# Patient Record
Sex: MALE | Race: WHITE | NOT HISPANIC OR LATINO | Employment: FULL TIME | URBAN - METROPOLITAN AREA
[De-identification: names, ages, dates, MRNs, and addresses within clinical notes are randomized per-mention and may not be internally consistent; named-entity substitution may affect disease eponyms.]

---

## 2017-03-30 ENCOUNTER — OFFICE VISIT (OUTPATIENT)
Dept: MEDICAL GROUP | Facility: PHYSICIAN GROUP | Age: 57
End: 2017-03-30
Payer: COMMERCIAL

## 2017-03-30 VITALS
RESPIRATION RATE: 12 BRPM | OXYGEN SATURATION: 98 % | BODY MASS INDEX: 21.2 KG/M2 | TEMPERATURE: 98.2 F | WEIGHT: 160 LBS | DIASTOLIC BLOOD PRESSURE: 84 MMHG | SYSTOLIC BLOOD PRESSURE: 126 MMHG | HEART RATE: 68 BPM | HEIGHT: 73 IN

## 2017-03-30 DIAGNOSIS — G43.109 MIGRAINE WITH AURA AND WITHOUT STATUS MIGRAINOSUS, NOT INTRACTABLE: ICD-10-CM

## 2017-03-30 DIAGNOSIS — E03.9 HYPOTHYROIDISM, UNSPECIFIED TYPE: ICD-10-CM

## 2017-03-30 DIAGNOSIS — E78.5 DYSLIPIDEMIA: ICD-10-CM

## 2017-03-30 DIAGNOSIS — Z87.19 HISTORY OF ULCERATIVE COLITIS: ICD-10-CM

## 2017-03-30 DIAGNOSIS — Z12.5 SCREENING FOR PROSTATE CANCER: ICD-10-CM

## 2017-03-30 PROCEDURE — 99214 OFFICE O/P EST MOD 30 MIN: CPT | Performed by: FAMILY MEDICINE

## 2017-03-30 ASSESSMENT — PATIENT HEALTH QUESTIONNAIRE - PHQ9: CLINICAL INTERPRETATION OF PHQ2 SCORE: 0

## 2017-03-30 NOTE — MR AVS SNAPSHOT
"        Estrada Ventura Robertflynnyomaira   3/30/2017 1:00 PM   Office Visit   MRN: [id number]    Department:  [facility]   Dept Phone:  [phone]    Description:  Male : 1960   Provider:  Anahi Palmer M.D.           Reason for Visit     Establish Care           Allergies as of 3/30/2017     No Known Allergies      You were diagnosed with     Hypothyroidism, unspecified type   [9774449]       History of ulcerative colitis   [587103]       Dyslipidemia   [945537]       Screening for prostate cancer   [544338]       Migraine with aura and without status migrainosus, not intractable   [654327]         Vital Signs     Blood Pressure Pulse Temperature Respirations Height Weight    126/84 mmHg 68 36.8 °C (98.2 °F) 12 1.854 m (6' 0.99\") 72.576 kg (160 lb)    Body Mass Index Oxygen Saturation Smoking Status             21.11 kg/m2 98% Former Smoker         Basic Information     Date Of Birth Sex Race Ethnicity Preferred Language    1960 Male White Non- English      Problem List              ICD-10-CM Priority Class Noted - Resolved    History of ulcerative colitis Z87.19   2012 - Present    Depigmentation of skin L81.9   2012 - Present    Vitiligo L80   2013 - Present    Hypothyroid E03.9   2013 - Present    Thyroid nodule E04.1   2015 - Present    Dyslipidemia E78.5   2015 - Present      Health Maintenance        Date Due Completion Dates    IMM INFLUENZA (1) 2016 ---    COLONOSCOPY 2019 (Prv Comp)    Override on 2009: Previously completed    IMM DTaP/Tdap/Td Vaccine (2 - Td) 2022            Current Immunizations     Tdap Vaccine 2012      Below and/or attached are the medications your provider expects you to take. Review all of your home medications and newly ordered medications with your provider and/or pharmacist. Follow medication instructions as directed by your provider and/or pharmacist. Please keep your medication list with " you and share with your provider. Update the information when medications are discontinued, doses are changed, or new medications (including over-the-counter products) are added; and carry medication information at all times in the event of emergency situations     Allergies:  No Known Allergies          Medications  Valid as of: March 30, 2017 -  2:14 PM    Generic Name Brand Name Tablet Size Instructions for use    Hydrocortisone Acetate (Suppos) ANUSOL-HC 25 MG Insert 1 Suppository in rectum every 12 hours.        Levothyroxine Sodium (Tab) SYNTHROID 125 MCG Take 1 Tab by mouth Every morning on an empty stomach.        .                 Medicines prescribed today were sent to:     [facility], [place] - [address]    [address] [address]    Phone: [phone] Fax: [phone]    Open 24 Hours?: No      Medication refill instructions:       If your prescription bottle indicates you have medication refills left, it is not necessary to call your provider’s office. Please contact your pharmacy and they will refill your medication.    If your prescription bottle indicates you do not have any refills left, you may request refills at any time through one of the following ways: The online [employer] system (except Urgent Care), by calling your provider’s office, or by asking your pharmacy to contact your provider’s office with a refill request. Medication refills are processed only during regular business hours and may not be available until the next business day. Your provider may request additional information or to have a follow-up visit with you prior to refilling your medication.   *Please Note: Medication refills are assigned a new Rx number when refilled electronically. Your pharmacy may indicate that no refills were authorized even though a new prescription for the same medication is available at the pharmacy. Please request the medicine by name with the pharmacy before  contacting your provider for a refill.        Your To Do List     Future Labs/Procedures Complete By Expires    CBC WITH DIFFERENTIAL  As directed 3/30/2018    COMP METABOLIC PANEL  As directed 3/30/2018    LIPID PROFILE  As directed 3/30/2018    MR-MRA HEAD-WITH & W/O AND SEQUENCES  As directed 3/30/2018    PROSTATE SPECIFIC AG SCREENING  As directed 3/30/2018    TSH WITH REFLEX TO FT4  As directed 3/30/2018         MyChart Access Code: Activation code not generated  Current MyChart Status: Active

## 2017-03-31 NOTE — PROGRESS NOTES
"Chief Complaint   Patient presents with   • Establish Care       HISTORY OF PRESENT ILLNESS: Patient is a 57 y.o. male new patient who presents today to discuss the following issues:    1. Hypothyroidism, unspecified type  This is a very pleasant 57-year-old gentleman who developed acquired hypothyroidism and has had multiple thyroid nodules. He reports that he is been stable on Synthroid 125 µg for significant amount of time now. Previously followed by endocrinology, however Dr. CHA Valdez left the practice and is no longer under his care. He reports that his energy level, hair or skin have been normal. Denies any diarrhea or constipation or palpitations. He is adherent to his Synthroid. Due for recheck on his labs.    2. History of ulcerative colitis  Patient relates history of ulcerative colitis more than 20 years ago. He reports that at that time made significant and drastic dietary changes that included a low glycemic and unprocessed diet. He reports with this he has not had any recurrence or flares. Reports the most recent colonoscopy was maybe around 2009 done in [place], and recalls at that time he was told there is no evidence that he ever had ulcerative colitis, no polyps were found and he was told to return in 10 years    3. Dyslipidemia  Patient does have history of mild hyperlipidemia. He is due for check on his cholesterol levels. No personal history of heart disease or stroke.    4. Screening for prostate cancer  Due for PSA testing.    5. Migraine with aura and without status migrainosus, not intractable   Patient does relate a concerning episode in which she developed what sounds like a central scotoma in the right eye in which he found that there was a very large blurry spots in his vision for several hours, followed by left sided \"prisms\". He reports there was no associated headache or neck pain. After describing the incident to his wife who suffers from migraines she told him that she felt this was " "likely an optical migraine. He has not had his eyes examined. Of most concern, patient has history of father, brother and sister all having had strokes. His father and brother he is unsure of if there were hemorrhagic strokes. His sister did have a ruptured cerebral aneurysm. He has never had any imaging to assess her aneurysm himself. He denies any headaches, any other visual changes. No numbness or tingling. No weakness. No change in gait or speech pattern.      Active Ambulatory Problems     Diagnosis Date Noted   • History of ulcerative colitis 2012   • Depigmentation of skin 2012   • Vitiligo 2013   • Hypothyroid 2013   • Thyroid nodule 2015   • Dyslipidemia 2015     Resolved Ambulatory Problems     Diagnosis Date Noted   • Palpitations 10/10/2013   • Diaphoresis 10/10/2013     Past Medical History   Diagnosis Date   • IBD (inflammatory bowel disease)        Allergies:Review of patient's allergies indicates no known allergies.    Current Outpatient Prescriptions   Medication Sig Dispense Refill   • SYNTHROID 125 MCG Tab Take 1 Tab by mouth Every morning on an empty stomach. 30 Tab 11   • hydrocortisone (ANUSOL-HC) 25 MG Suppos Insert 1 Suppository in rectum every 12 hours. 10 Suppository 1     No current facility-administered medications for this visit.       Social History   Substance Use Topics   • Smoking status: Former Smoker -- 0.10 packs/day for 10 years     Types: Cigarettes     Quit date: 1986   • Smokeless tobacco: Never Used   • Alcohol Use: Yes      Comment: occasionally       Family Status   Relation Status Death Age   • Mother  70   • Father  80     MI   • Sister  55     Growth in brain stem \"burst\"   • Brother Alive    • Son Alive    • Son Alive    • Son Alive      Family History   Problem Relation Age of Onset   • Cancer Mother      Breast Cancer--metastatic   • Heart Disease Father    • Stroke Father    • Stroke Brother    • " "Stroke Sister      [facility]     Health Maintenance Due   Topic Date Due   • IMM INFLUENZA (1) 09/01/2016       ROS:  Review of Systems   Constitutional: Negative for fever, chills, weight loss and malaise/fatigue.   HENT: Negative for ear pain, nosebleeds, congestion, sore throat and neck pain.    Eyes: Negative for blurred vision.   Respiratory: Negative for cough, sputum production, shortness of breath and wheezing.    Cardiovascular: Negative for chest pain, palpitations, orthopnea and leg swelling.   Gastrointestinal: Negative for heartburn, nausea, vomiting and abdominal pain.   Genitourinary: Negative for dysuria, urgency and frequency.   Musculoskeletal: Negative for myalgias, back pain and joint pain.   Skin: Negative for rash and itching.   Neurological: Negative for dizziness, tingling, tremors, sensory change, focal weakness and headaches.   Endo/Heme/Allergies: Does not bruise/bleed easily.   Psychiatric/Behavioral: Negative for depression, suicidal ideas and memory loss.  The patient is not nervous/anxious and does not have insomnia.      Exam:  Blood pressure 126/84, pulse 68, temperature 36.8 °C (98.2 °F), resp. rate 12, height 1.854 m (6' 0.99\"), weight 72.576 kg (160 lb), SpO2 98 %.  General:  Well nourished, well developed male in NAD  HEENT: Normocephalic and atraumatic. TMs clear bilaterally. Oropharynx is clear      without erythema and no tonsillar exudate. Nasal mucosa pink with minimal      Rhinorrhea.  EYES: EOMI.  Conjunctiva clear.    Neck: Supple without JVD or bruit. Thyroid is not enlarged.  Pulmonary: Clear to ausculation and percussion.  Normal effort. No rales, ronchi, or wheezing.  Cardiovascular: Regular rate and rhythm without murmur, no peripheral edema  Abdomen: positive bowel sounds.  Non tender, non distended, no hepatosplenomegaly.   MSK: normal ROM in upper and lower extremities bilaterally  Psych: appropriate affect and concentration, oriented to person and place  Neuro: no " unilateral weakness in upper or lower extremities.  No gait disturbance    Please note that this dictation was created using voice recognition software. I have made every reasonable attempt to correct obvious errors, but I expect that there are errors of grammar and possibly content that I did not discover before finalizing the note.    Assessment/Plan:  1. Hypothyroidism, unspecified type  Continue Synthroid 125 check labs  - TSH WITH REFLEX TO FT4; Future    2. History of ulcerative colitis  No evidence of recurrence, will request records of previous colonoscopy to update his health maintenance   - CBC WITH DIFFERENTIAL; Future    3. Dyslipidemia    - LIPID PROFILE; Future  - COMP METABOLIC PANEL; Future    4. Screening for prostate cancer    - PROSTATE SPECIFIC AG SCREENING; Future    5. Migraine with aura and without status migrainosus, not intractable  Concerning with family history of cerebral aneurysm and multiple strokes. We'll evaluate with MRI MRA of the brain. At this time I don't have any concern for acute bleed therefore we will spare the radiation and cost of CT will not be pursued.  - MR-MRA HEAD-WITH & W/O AND SEQUENCES; Future

## 2017-06-23 ENCOUNTER — HOSPITAL ENCOUNTER (OUTPATIENT)
Dept: LAB | Facility: MEDICAL CENTER | Age: 57
End: 2017-06-23
Attending: FAMILY MEDICINE
Payer: COMMERCIAL

## 2017-06-23 DIAGNOSIS — Z87.19 HISTORY OF ULCERATIVE COLITIS: ICD-10-CM

## 2017-06-23 DIAGNOSIS — Z12.5 SCREENING FOR PROSTATE CANCER: ICD-10-CM

## 2017-06-23 DIAGNOSIS — E03.9 HYPOTHYROIDISM, UNSPECIFIED TYPE: ICD-10-CM

## 2017-06-23 DIAGNOSIS — E78.5 DYSLIPIDEMIA: ICD-10-CM

## 2017-06-23 LAB
ALBUMIN SERPL BCP-MCNC: 4.2 G/DL (ref 3.2–4.9)
ALBUMIN/GLOB SERPL: 1.8 G/DL
ALP SERPL-CCNC: 51 U/L (ref 30–99)
ALT SERPL-CCNC: 15 U/L (ref 2–50)
ANION GAP SERPL CALC-SCNC: 5 MMOL/L (ref 0–11.9)
AST SERPL-CCNC: 17 U/L (ref 12–45)
BASOPHILS # BLD AUTO: 0.8 % (ref 0–1.8)
BASOPHILS # BLD: 0.05 K/UL (ref 0–0.12)
BILIRUB SERPL-MCNC: 1 MG/DL (ref 0.1–1.5)
BUN SERPL-MCNC: 15 MG/DL (ref 8–22)
CALCIUM SERPL-MCNC: 9.1 MG/DL (ref 8.5–10.5)
CHLORIDE SERPL-SCNC: 104 MMOL/L (ref 96–112)
CHOLEST SERPL-MCNC: 174 MG/DL (ref 100–199)
CO2 SERPL-SCNC: 30 MMOL/L (ref 20–33)
CREAT SERPL-MCNC: 1.11 MG/DL (ref 0.5–1.4)
EOSINOPHIL # BLD AUTO: 0.22 K/UL (ref 0–0.51)
EOSINOPHIL NFR BLD: 3.4 % (ref 0–6.9)
ERYTHROCYTE [DISTWIDTH] IN BLOOD BY AUTOMATED COUNT: 39.3 FL (ref 35.9–50)
GFR SERPL CREATININE-BSD FRML MDRD: >60 ML/MIN/1.73 M 2
GLOBULIN SER CALC-MCNC: 2.4 G/DL (ref 1.9–3.5)
GLUCOSE SERPL-MCNC: 89 MG/DL (ref 65–99)
HCT VFR BLD AUTO: 46.8 % (ref 42–52)
HDLC SERPL-MCNC: 48 MG/DL
HGB BLD-MCNC: 15.8 G/DL (ref 14–18)
IMM GRANULOCYTES # BLD AUTO: 0.03 K/UL (ref 0–0.11)
IMM GRANULOCYTES NFR BLD AUTO: 0.5 % (ref 0–0.9)
LDLC SERPL CALC-MCNC: 113 MG/DL
LYMPHOCYTES # BLD AUTO: 1.21 K/UL (ref 1–4.8)
LYMPHOCYTES NFR BLD: 18.9 % (ref 22–41)
MCH RBC QN AUTO: 29.2 PG (ref 27–33)
MCHC RBC AUTO-ENTMCNC: 33.8 G/DL (ref 33.7–35.3)
MCV RBC AUTO: 86.3 FL (ref 81.4–97.8)
MONOCYTES # BLD AUTO: 0.47 K/UL (ref 0–0.85)
MONOCYTES NFR BLD AUTO: 7.3 % (ref 0–13.4)
NEUTROPHILS # BLD AUTO: 4.42 K/UL (ref 1.82–7.42)
NEUTROPHILS NFR BLD: 69.1 % (ref 44–72)
NRBC # BLD AUTO: 0 K/UL
NRBC BLD AUTO-RTO: 0 /100 WBC
PLATELET # BLD AUTO: 147 K/UL (ref 164–446)
PMV BLD AUTO: 9.5 FL (ref 9–12.9)
POTASSIUM SERPL-SCNC: 4.1 MMOL/L (ref 3.6–5.5)
PROT SERPL-MCNC: 6.6 G/DL (ref 6–8.2)
PSA SERPL-MCNC: 1.36 NG/ML (ref 0–4)
RBC # BLD AUTO: 5.42 M/UL (ref 4.7–6.1)
SODIUM SERPL-SCNC: 139 MMOL/L (ref 135–145)
T4 FREE SERPL-MCNC: 1.17 NG/DL (ref 0.53–1.43)
TRIGL SERPL-MCNC: 67 MG/DL (ref 0–149)
TSH SERPL DL<=0.005 MIU/L-ACNC: 7.1 UIU/ML (ref 0.3–3.7)
WBC # BLD AUTO: 6.4 K/UL (ref 4.8–10.8)

## 2017-06-23 PROCEDURE — 84153 ASSAY OF PSA TOTAL: CPT

## 2017-06-23 PROCEDURE — 36415 COLL VENOUS BLD VENIPUNCTURE: CPT

## 2017-06-23 PROCEDURE — 84443 ASSAY THYROID STIM HORMONE: CPT

## 2017-06-23 PROCEDURE — 80053 COMPREHEN METABOLIC PANEL: CPT

## 2017-06-23 PROCEDURE — 85025 COMPLETE CBC W/AUTO DIFF WBC: CPT

## 2017-06-23 PROCEDURE — 80061 LIPID PANEL: CPT

## 2017-06-23 PROCEDURE — 84439 ASSAY OF FREE THYROXINE: CPT

## 2017-06-27 ENCOUNTER — TELEPHONE (OUTPATIENT)
Dept: MEDICAL GROUP | Facility: PHYSICIAN GROUP | Age: 57
End: 2017-06-27

## 2017-06-27 RX ORDER — LEVOTHYROXINE SODIUM 125 MCG
125 TABLET ORAL
Qty: 30 TAB | Refills: 11 | Status: SHIPPED | OUTPATIENT
Start: 2017-06-27 | End: 2017-06-28

## 2017-06-28 ENCOUNTER — PATIENT MESSAGE (OUTPATIENT)
Dept: MEDICAL GROUP | Facility: PHYSICIAN GROUP | Age: 57
End: 2017-06-28

## 2017-06-28 DIAGNOSIS — E03.9 HYPOTHYROIDISM, UNSPECIFIED TYPE: ICD-10-CM

## 2017-06-28 RX ORDER — LEVOTHYROXINE SODIUM 137 MCG
137 TABLET ORAL
Qty: 90 TAB | Refills: 3 | Status: SHIPPED | OUTPATIENT
Start: 2017-06-28 | End: 2017-06-29 | Stop reason: SDUPTHER

## 2017-06-28 NOTE — TELEPHONE ENCOUNTER
----- Message from Anahi Palmer M.D. sent at 6/27/2017  4:56 PM PDT -----  Please call patient to ensure they received their results through My Chart.  Please see if he has been taking his thyroid medication.  It looks like the thyroid is off.  Not sure if he needs higher dose or just has not been taking it.

## 2017-06-28 NOTE — TELEPHONE ENCOUNTER
Estrada Toussaint    To   Anahi Palmer M.D.    Sent   6/28/2017  3:43 PM         Greetings,   I received your voice mail about my test results.   Yes I am taking my Thyroid medicine, daily.   I do notice that the TSH count is high, but if you look at prior tests the TSH is high also.     I also understand that I have run out of refills for my Thyroid medicine. Could you add some refills?   If you think it would be wise to increase the dosage, please do so.     Thank You   [phone] gianna Carrillo

## 2017-06-29 DIAGNOSIS — E03.9 HYPOTHYROIDISM, UNSPECIFIED TYPE: ICD-10-CM

## 2017-06-29 RX ORDER — LEVOTHYROXINE SODIUM 137 MCG
137 TABLET ORAL
Qty: 90 TAB | Refills: 3 | Status: SHIPPED | OUTPATIENT
Start: 2017-06-29 | End: 2018-03-15 | Stop reason: SDUPTHER

## 2018-03-15 DIAGNOSIS — E03.9 HYPOTHYROIDISM, UNSPECIFIED TYPE: ICD-10-CM

## 2018-03-15 RX ORDER — LEVOTHYROXINE SODIUM 137 MCG
137 TABLET ORAL
Qty: 90 TAB | Refills: 0 | Status: SHIPPED | OUTPATIENT
Start: 2018-03-15 | End: 2018-04-17

## 2018-03-15 NOTE — TELEPHONE ENCOUNTER
Patient requests change in pharmacy.     [facility] - [place], [place] - [address] [place] [id number]  Phone: [phone] Fax: [phone]

## 2018-03-15 NOTE — TELEPHONE ENCOUNTER
Pt is requesting prescription be transferred to   [facility]  Last ov 3/30/17  Last TSH 6/17

## 2018-04-16 ENCOUNTER — HOSPITAL ENCOUNTER (OUTPATIENT)
Dept: LAB | Facility: MEDICAL CENTER | Age: 58
End: 2018-04-16
Attending: FAMILY MEDICINE
Payer: COMMERCIAL

## 2018-04-16 DIAGNOSIS — E03.9 HYPOTHYROIDISM, UNSPECIFIED TYPE: ICD-10-CM

## 2018-04-16 LAB
T4 FREE SERPL-MCNC: 1.12 NG/DL (ref 0.53–1.43)
TSH SERPL DL<=0.005 MIU/L-ACNC: 7 UIU/ML (ref 0.38–5.33)

## 2018-04-16 PROCEDURE — 36415 COLL VENOUS BLD VENIPUNCTURE: CPT

## 2018-04-16 PROCEDURE — 84443 ASSAY THYROID STIM HORMONE: CPT

## 2018-04-16 PROCEDURE — 84439 ASSAY OF FREE THYROXINE: CPT

## 2018-04-17 ENCOUNTER — TELEPHONE (OUTPATIENT)
Dept: MEDICAL GROUP | Facility: PHYSICIAN GROUP | Age: 58
End: 2018-04-17

## 2018-04-17 DIAGNOSIS — E03.9 HYPOTHYROIDISM, UNSPECIFIED TYPE: ICD-10-CM

## 2018-04-17 RX ORDER — LEVOTHYROXINE SODIUM 150 MCG
150 TABLET ORAL
Qty: 90 TAB | Refills: 3 | Status: SHIPPED | OUTPATIENT
Start: 2018-04-17 | End: 2018-04-17 | Stop reason: SDUPTHER

## 2018-04-17 RX ORDER — LEVOTHYROXINE SODIUM 150 MCG
150 TABLET ORAL
Qty: 90 TAB | Refills: 3 | Status: SHIPPED | OUTPATIENT
Start: 2018-04-17 | End: 2019-03-27 | Stop reason: SDUPTHER

## 2018-04-17 NOTE — TELEPHONE ENCOUNTER
----- Message from Anahi Palmer M.D. sent at 4/17/2018  1:05 PM PDT -----  Please call Estrada.  Thyroid not controlled on 137 mcg, needs to increase to 150 mcg.  Recheck in 3 months.

## 2018-08-16 ENCOUNTER — HOSPITAL ENCOUNTER (OUTPATIENT)
Dept: RADIOLOGY | Facility: MEDICAL CENTER | Age: 58
End: 2018-08-16
Attending: INTERNAL MEDICINE
Payer: COMMERCIAL

## 2018-08-16 ENCOUNTER — OFFICE VISIT (OUTPATIENT)
Dept: MEDICAL GROUP | Facility: PHYSICIAN GROUP | Age: 58
End: 2018-08-16
Payer: COMMERCIAL

## 2018-08-16 VITALS
HEIGHT: 73 IN | SYSTOLIC BLOOD PRESSURE: 106 MMHG | TEMPERATURE: 99.1 F | RESPIRATION RATE: 12 BRPM | WEIGHT: 165.2 LBS | HEART RATE: 62 BPM | OXYGEN SATURATION: 97 % | BODY MASS INDEX: 21.89 KG/M2 | DIASTOLIC BLOOD PRESSURE: 60 MMHG

## 2018-08-16 DIAGNOSIS — M25.561 ACUTE PAIN OF RIGHT KNEE: ICD-10-CM

## 2018-08-16 PROCEDURE — 99213 OFFICE O/P EST LOW 20 MIN: CPT | Performed by: INTERNAL MEDICINE

## 2018-08-16 PROCEDURE — 73564 X-RAY EXAM KNEE 4 OR MORE: CPT | Mod: RT

## 2018-08-16 ASSESSMENT — PATIENT HEALTH QUESTIONNAIRE - PHQ9: CLINICAL INTERPRETATION OF PHQ2 SCORE: 0

## 2018-08-16 NOTE — PROGRESS NOTES
PRIMARY CARE CLINIC FOLLOW UP VISIT  Chief Complaint   Patient presents with   • Knee Pain     Rt Side        History of Present Illness     Estrada is a pleasant 57 yo male patient of Dr. Palmer presenting for the following:     Right knee pain  Years ago he had an industrial accident and had his knee bent backwards and he had ACL repair. Recently his right knee is swollen. He just returned 2 weeks ago from a 2 week road trip and would have issues with bending the knee when he would get out the vehicle. He was taking daily aleve to make it through his vacation and was particularly harder on him when he was at [facility]. Notes occasional clicking/popping but the swelling is persistent. Some days the knee is worse and other days it is better. In the mornings it takes some time for the knee to regain some mobility. He used a knee brace for some time to help him sleep at night. Aleve helps him get through the night without waking up with pain. Taking aleve once every 24 hours in the evenings. The knee swelling started prior to the vacation but cannot identify any triggering event but got worse while he was on vacation. In the past week he has noted some improvement. Sometimes his pain is centered on the knee and at other times localized to the surrounding tendons. Pain is a constant, dull ache and worse when it swells and has fluid in it. Tried ice which helps with the swelling but doesn't do much for the pain. When he is active his right knee swells up and wakes up with right knee stiffness and swelling.       Current Outpatient Prescriptions   Medication Sig Dispense Refill   • Naproxen Sodium (ALEVE PO) Take  by mouth.     • SYNTHROID 150 MCG Tab Take 1 Tab by mouth Every morning on an empty stomach. 90 Tab 3   • hydrocortisone (ANUSOL-HC) 25 MG Suppos Insert 1 Suppository in rectum every 12 hours. 10 Suppository 1     No current facility-administered medications for this visit.      Past Medical History:  "  Diagnosis Date   • Hypothyroid 2013   • IBD (inflammatory bowel disease)    • Vitiligo 2013     Past Surgical History:   Procedure Laterality Date   • KNEE RECONSTRUCTION      Right knee   Tosin  ACL replacement      Social History   Substance Use Topics   • Smoking status: Former Smoker     Packs/day: 0.10     Years: 10.00     Types: Cigarettes     Quit date: 1986   • Smokeless tobacco: Never Used   • Alcohol use 0.0 oz/week      Comment: occasionally     Social History     Social History Narrative   • No narrative on file     Family History   Problem Relation Age of Onset   • Cancer Mother         Breast Cancer--metastatic   • Heart Disease Father    • Stroke Father    • Stroke Brother    • Stroke Sister         aneursym     Family Status   Relation Status   • Mo  at age 70   • Fa  at age 80        MI   • Sis  at age 55        Growth in brain stem \"burst\"   • Bro Alive   • Son Alive   • Son Alive   • Son Alive   • Sis (Not Specified)     Allergies: Patient has no known allergies.    ROS  As per HPI above. All other systems reviewed and negative.        Objective   Blood pressure 106/60, pulse 62, temperature 37.3 °C (99.1 °F), resp. rate 12, height 1.854 m (6' 0.99\"), weight 74.9 kg (165 lb 3.2 oz), SpO2 97 %. Body mass index is 21.8 kg/m².    General: alert and oriented, pleasant, cooperative  HEENT: Normocephalic, atraumatic.   MSK: right knee effusion with negative anterior/posterior drawer tests, negative varus/valgus testing. Limited flexion/extension    Skin: warm and dry, no lesions or rashes  Psychiatric: appropriate mood and affect. Good insight and appropriate judgment     Assessment and Plan   The following treatment plan was discussed     1. Acute pain of right knee  Will evaluate with plain films but has an acute effusion, doesn't appear to be a septic joint. Presently aleve once a day is helping manage his pain. Will refer to Dr. Zamudio in sports " medicine.   - DX-KNEE COMPLETE 4+ RIGHT; Future  - REFERRAL TO SPORTS MEDICINE      Return if symptoms worsen or fail to improve.    Vic Rosa MD  Internal Medicine  [facility]

## 2018-08-16 NOTE — ASSESSMENT & PLAN NOTE
Years ago he had an industrial accident and had his knee bent backwards and he had ACL repair. Recently his right knee is swollen. He just returned 2 weeks ago from a 2 week road trip and would have issues with bending the knee when he would get out the vehicle. He was taking daily aleve to make it through his vacation and was particularly harder on him when he was at [facility]. Notes occasional clicking/popping but the swelling is persistent. Some days the knee is worse and other days it is better. In the mornings it takes some time for the knee to regain some mobility. He used a knee brace for some time to help him sleep at night. Aleve helps him get through the night without waking up with pain. Taking aleve once every 24 hours in the evenings. The knee swelling started prior to the vacation but cannot identify any triggering event but got worse while he was on vacation. In the past week he has noted some improvement. Sometimes his pain is centered on the knee and at other times localized to the surrounding tendons. Pain is a constant, dull ache and worse when it swells and has fluid in it. Tried ice which helps with the swelling but doesn't do much for the pain. When he is active his right knee swells up and wakes up with right knee stiffness and swelling.

## 2018-08-20 ENCOUNTER — PATIENT MESSAGE (OUTPATIENT)
Dept: MEDICAL GROUP | Facility: PHYSICIAN GROUP | Age: 58
End: 2018-08-20

## 2018-08-20 NOTE — TELEPHONE ENCOUNTER
----- Message from Estrada Toussaint sent at 8/20/2018  3:52 PM PDT -----  Regarding: Test Result Question  Contact: [phone]  Hi,    Thanks for posting the results of the xrays. Sounds like there are a few things wrong.  Do you have an idea of when I will be able see the sports DR ( I don't remember his name)?    Can I take you up on the prescription meds, I do OK on the  aleive during the day for pain. But at night it's a different story  I get an occasional sudden pain probably from when I move. It  wakes me up, then I stay awake  with a consistent ache.    Thanks Gerardo

## 2018-08-21 RX ORDER — MELOXICAM 7.5 MG/1
15 TABLET ORAL DAILY
Qty: 30 TAB | Refills: 0 | Status: SHIPPED | OUTPATIENT
Start: 2018-08-21 | End: 2020-01-07

## 2018-09-04 ENCOUNTER — OFFICE VISIT (OUTPATIENT)
Dept: MEDICAL GROUP | Facility: CLINIC | Age: 58
End: 2018-09-04
Payer: COMMERCIAL

## 2018-09-04 VITALS
BODY MASS INDEX: 21.87 KG/M2 | HEIGHT: 73 IN | RESPIRATION RATE: 16 BRPM | OXYGEN SATURATION: 99 % | SYSTOLIC BLOOD PRESSURE: 110 MMHG | HEART RATE: 79 BPM | TEMPERATURE: 98 F | DIASTOLIC BLOOD PRESSURE: 70 MMHG | WEIGHT: 165 LBS

## 2018-09-04 DIAGNOSIS — M17.31 POST-TRAUMATIC OSTEOARTHRITIS OF RIGHT KNEE: ICD-10-CM

## 2018-09-04 DIAGNOSIS — M25.461 KNEE EFFUSION, RIGHT: ICD-10-CM

## 2018-09-04 PROCEDURE — 99203 OFFICE O/P NEW LOW 30 MIN: CPT | Mod: 25 | Performed by: FAMILY MEDICINE

## 2018-09-04 PROCEDURE — 20610 DRAIN/INJ JOINT/BURSA W/O US: CPT | Mod: RT | Performed by: FAMILY MEDICINE

## 2018-09-04 RX ORDER — IBUPROFEN 200 MG
200 TABLET ORAL EVERY 6 HOURS PRN
COMMUNITY

## 2018-09-04 RX ORDER — TRIAMCINOLONE ACETONIDE 40 MG/ML
40 INJECTION, SUSPENSION INTRA-ARTICULAR; INTRAMUSCULAR ONCE
Status: COMPLETED | OUTPATIENT
Start: 2018-09-04 | End: 2018-09-04

## 2018-09-04 RX ADMIN — TRIAMCINOLONE ACETONIDE 40 MG: 40 INJECTION, SUSPENSION INTRA-ARTICULAR; INTRAMUSCULAR at 15:03

## 2018-09-04 ASSESSMENT — PAIN SCALES - GENERAL: PAINLEVEL: 6=MODERATE PAIN

## 2018-09-04 NOTE — PROGRESS NOTES
CHIEF COMPLAINT:  Estrada Toussaint male presenting at the request of Vic Rosa M.D. for evaluation of knee pain.     Estrada Toussaint is complaining of right knee pain  Symptoms began approximately 2-1/2 months ago (around mid June 2018)  Pain is at the whole knee including posterior knee tension/pressure  Quality is aching, sharp,  constant dull  Pain is non-radiating   Improved with resting  Aggravated by running and long hikes, riding motorcycle  previous knee injury approximately 30 years ago, traumatic hyperextension injury causing ACL tear  Status post ACL reconstruction the RIGHT knee   Prior Treatments: Seen by primary physician  Prior studies: X-Ray   Medications tried for pain include: aleve, helps some  Mechanical Symptom history: No Locking, POSITIVE popping and cracking which can be painful with weightbearing    REVIEW OF SYSTEMS  No Nausea, No Vomiting, No Chest Pain, No Shortness of Breath, No Dizziness, No Headache      PAST MEDICAL HISTORY:   History reviewed. No pertinent past medical history.    PMH:  has a past medical history of Hypothyroid (5/16/2013); IBD (inflammatory bowel disease); and Vitiligo (5/16/2013).  MEDS:   Current Outpatient Prescriptions:   •  Naproxen Sodium (ALEVE PO), Take  by mouth., Disp: , Rfl:   •  SYNTHROID 150 MCG Tab, Take 1 Tab by mouth Every morning on an empty stomach., Disp: 90 Tab, Rfl: 3  •  ibuprofen (MOTRIN) 200 MG Tab, Take 200 mg by mouth every 6 hours as needed., Disp: , Rfl:   •  meloxicam (MOBIC) 7.5 MG Tab, Take 2 Tabs by mouth every day., Disp: 30 Tab, Rfl: 0  •  hydrocortisone (ANUSOL-HC) 25 MG Suppos, Insert 1 Suppository in rectum every 12 hours., Disp: 10 Suppository, Rfl: 1  ALLERGIES: No Known Allergies  SURGHX:   Past Surgical History:   Procedure Laterality Date   • KNEE RECONSTRUCTION  1988    Right knee   [place]  ACL replacement      SOCHX:  reports that he quit smoking about 32 years ago. His smoking use included Cigarettes.  "He has a 1.00 pack-year smoking history. He has never used smokeless tobacco. He reports that he drinks alcohol. He reports that he does not use drugs.  FH: Family history was reviewed, no pertinent findings to report     PHYSICAL EXAM:  /70   Pulse 79   Temp 36.7 °C (98 °F)   Resp 16   Ht 1.854 m (6' 1\")   Wt 74.8 kg (165 lb)   SpO2 99%   BMI 21.77 kg/m²      well-developed, well-nourished in no apparent distress, alert and oriented x 3.  Gait: antalgic     RIGHT Knee:  Slight Varus and Swelling   Range of Motion Slightly limited with Flexion and Pain at extremes of motion  2+ effusion  Patellar No tenderness and no apprehension  Medial Joint Line Tenderness and NEGATIVE Cathie  Lateral Joint Line Non-tender and NEGATIVE Cathie  Trace Laxity with Varus stress  Trace Laxity with Valgus stress  Lachman's testing is Trace  Posterior Drawer Testing is Trace  The leg is otherwise neurovascularly intact    LEFT Knee:  Slight Varus and No Swelling   Range of Motion Intact  Trace effusion  Patellar No tenderness and no apprehension  Medial Joint Line Non-tender and NEGATIVE Cathie  Lateral Joint Line Non-tender and NEGATIVE Cathie  Trace Laxity with Varus stress  Trace Laxity with Valgus stress  Lachman's testing is Trace  Posterior Drawer Testing is Trace  The leg is otherwise neurovascularly intact    Additional Findings: None      1. Post-traumatic osteoarthritis of right knee  triamcinolone acetonide (KENALOG-40) injection 40 mg    REFERRAL TO PHYSICAL THERAPY Reason for Therapy: Eval/Treat/Report   2. Knee effusion, right       Discussed knee osteoarthritis management options includin.  Joint protection, SAMe and anti-inflammatories   2.  Non-offending activities such as cycling or swimming   3.  Knee bracing, Acupuncture  4. Injection options including corticosteroid, viscosupplementation and stem cell injections  5. Surgical options    Patient has elected to proceed with " aspiration/corticosteroid injection together with formal physical therapy  Home exercise program was also provided    Corticosteroid injection with aspiration performed in the office TODAY (September 4, 2018)    PROCEDURE NOTE:  right knee ASPIRATION/WITH CORTICOSTEROID  injection  Consent was obtained, using sterile technique the knee was prepped  Supra-lateral patellar approach.   18 G needle for aspiration of 30 cc of straw colored fluid and the needle withdrawn.    Using the same port 5 cc marcaine and 40 mg kenalog injected into the knee joint  The procedure was well tolerated.    Watch for fever, or increased swelling or persistent pain in knee. Call or return to clinic prn if such symptoms occur or the knee fails to improve as anticipated.    Return in about 4 weeks (around 10/2/2018).  To see how he is doing after his aspiration/corticosteroid injection of the RIGHT knee along with formal physical therapy and home exercise knee program            8/16/2018 3:39 PM    HISTORY/REASON FOR EXAM:  knee swelling, pain  Atraumatic Pain/Swelling/Deformity      TECHNIQUE/EXAM DESCRIPTION AND NUMBER OF VIEWS:  4 views of the RIGHT knee.    COMPARISON: None    FINDINGS:  No acute fracture or malalignment. Moderate-sized osteophytes project from the medial and lateral compartments as well as the posterior patella. There is at least moderate narrowing of the medial compartment with associated sclerosis.  There is a   moderate to large joint effusion.  There is chondrocalcinosis. There is evidence of prior ACL repair.     Impression       1.  Moderate to large knee effusion.    2.  Status post ACL repair.    3.  Moderate osteoarthropathy of the right knee joint.    4.  Nonspecific chondrocalcinosis. Findings may be related to CPPD.     done elsewhere and reviewed independently by me    Thank you Vic Rosa M.D. for allowing me to participate in caring for your patient.

## 2019-10-15 DIAGNOSIS — E03.9 HYPOTHYROIDISM, UNSPECIFIED TYPE: ICD-10-CM

## 2019-10-17 ENCOUNTER — HOSPITAL ENCOUNTER (OUTPATIENT)
Dept: LAB | Facility: MEDICAL CENTER | Age: 59
End: 2019-10-17
Attending: FAMILY MEDICINE
Payer: COMMERCIAL

## 2019-10-17 DIAGNOSIS — E03.9 HYPOTHYROIDISM, UNSPECIFIED TYPE: ICD-10-CM

## 2019-10-17 DIAGNOSIS — E78.5 DYSLIPIDEMIA: ICD-10-CM

## 2019-10-17 LAB
ALBUMIN SERPL BCP-MCNC: 4 G/DL (ref 3.2–4.9)
ALBUMIN/GLOB SERPL: 1.6 G/DL
ALP SERPL-CCNC: 49 U/L (ref 30–99)
ALT SERPL-CCNC: 14 U/L (ref 2–50)
ANION GAP SERPL CALC-SCNC: 6 MMOL/L (ref 0–11.9)
AST SERPL-CCNC: 14 U/L (ref 12–45)
BILIRUB SERPL-MCNC: 0.8 MG/DL (ref 0.1–1.5)
BUN SERPL-MCNC: 14 MG/DL (ref 8–22)
CALCIUM SERPL-MCNC: 9.1 MG/DL (ref 8.5–10.5)
CHLORIDE SERPL-SCNC: 104 MMOL/L (ref 96–112)
CHOLEST SERPL-MCNC: 162 MG/DL (ref 100–199)
CO2 SERPL-SCNC: 30 MMOL/L (ref 20–33)
CREAT SERPL-MCNC: 1.09 MG/DL (ref 0.5–1.4)
ERYTHROCYTE [DISTWIDTH] IN BLOOD BY AUTOMATED COUNT: 41 FL (ref 35.9–50)
FASTING STATUS PATIENT QL REPORTED: NORMAL
GLOBULIN SER CALC-MCNC: 2.5 G/DL (ref 1.9–3.5)
GLUCOSE SERPL-MCNC: 90 MG/DL (ref 65–99)
HCT VFR BLD AUTO: 47.2 % (ref 42–52)
HDLC SERPL-MCNC: 37 MG/DL
HGB BLD-MCNC: 15.8 G/DL (ref 14–18)
LDLC SERPL CALC-MCNC: 108 MG/DL
MCH RBC QN AUTO: 29.6 PG (ref 27–33)
MCHC RBC AUTO-ENTMCNC: 33.5 G/DL (ref 33.7–35.3)
MCV RBC AUTO: 88.4 FL (ref 81.4–97.8)
PLATELET # BLD AUTO: 157 K/UL (ref 164–446)
PMV BLD AUTO: 10 FL (ref 9–12.9)
POTASSIUM SERPL-SCNC: 4.5 MMOL/L (ref 3.6–5.5)
PROT SERPL-MCNC: 6.5 G/DL (ref 6–8.2)
RBC # BLD AUTO: 5.34 M/UL (ref 4.7–6.1)
SODIUM SERPL-SCNC: 140 MMOL/L (ref 135–145)
T4 FREE SERPL-MCNC: 1.4 NG/DL (ref 0.53–1.43)
TRIGL SERPL-MCNC: 85 MG/DL (ref 0–149)
TSH SERPL DL<=0.005 MIU/L-ACNC: 1.12 UIU/ML (ref 0.38–5.33)
WBC # BLD AUTO: 5.7 K/UL (ref 4.8–10.8)

## 2019-10-17 PROCEDURE — 36415 COLL VENOUS BLD VENIPUNCTURE: CPT

## 2019-10-17 PROCEDURE — 84443 ASSAY THYROID STIM HORMONE: CPT

## 2019-10-17 PROCEDURE — 80053 COMPREHEN METABOLIC PANEL: CPT

## 2019-10-17 PROCEDURE — 85027 COMPLETE CBC AUTOMATED: CPT

## 2019-10-17 PROCEDURE — 84439 ASSAY OF FREE THYROXINE: CPT

## 2019-10-17 PROCEDURE — 80061 LIPID PANEL: CPT

## 2019-10-17 RX ORDER — LEVOTHYROXINE SODIUM 150 MCG
TABLET ORAL
Qty: 30 TAB | Refills: 0 | OUTPATIENT
Start: 2019-10-17

## 2019-11-04 DIAGNOSIS — E03.9 HYPOTHYROIDISM, UNSPECIFIED TYPE: ICD-10-CM

## 2019-11-06 RX ORDER — LEVOTHYROXINE SODIUM 150 MCG
150 TABLET ORAL
Qty: 90 TAB | Refills: 3 | Status: SHIPPED | OUTPATIENT
Start: 2019-11-06 | End: 2020-10-12

## 2019-11-14 ENCOUNTER — OFFICE VISIT (OUTPATIENT)
Dept: URGENT CARE | Facility: PHYSICIAN GROUP | Age: 59
End: 2019-11-14
Payer: COMMERCIAL

## 2019-11-14 VITALS
OXYGEN SATURATION: 99 % | RESPIRATION RATE: 16 BRPM | DIASTOLIC BLOOD PRESSURE: 82 MMHG | HEART RATE: 80 BPM | HEIGHT: 73 IN | TEMPERATURE: 97.4 F | SYSTOLIC BLOOD PRESSURE: 130 MMHG | BODY MASS INDEX: 21.74 KG/M2 | WEIGHT: 164 LBS

## 2019-11-14 DIAGNOSIS — G43.009 MIGRAINE WITHOUT AURA AND WITHOUT STATUS MIGRAINOSUS, NOT INTRACTABLE: ICD-10-CM

## 2019-11-14 DIAGNOSIS — R11.0 NAUSEA: ICD-10-CM

## 2019-11-14 PROCEDURE — 99214 OFFICE O/P EST MOD 30 MIN: CPT | Performed by: NURSE PRACTITIONER

## 2019-11-14 RX ORDER — KETOROLAC TROMETHAMINE 30 MG/ML
30 INJECTION, SOLUTION INTRAMUSCULAR; INTRAVENOUS ONCE
Status: COMPLETED | OUTPATIENT
Start: 2019-11-14 | End: 2019-11-14

## 2019-11-14 RX ORDER — ONDANSETRON HYDROCHLORIDE 8 MG/1
8 TABLET, FILM COATED ORAL EVERY 8 HOURS PRN
Qty: 10 TAB | Refills: 0 | Status: SHIPPED | OUTPATIENT
Start: 2019-11-14 | End: 2021-05-04

## 2019-11-14 RX ORDER — ACETAMINOPHEN 500 MG
500-1000 TABLET ORAL EVERY 6 HOURS PRN
COMMUNITY

## 2019-11-14 RX ADMIN — KETOROLAC TROMETHAMINE 30 MG: 30 INJECTION, SOLUTION INTRAMUSCULAR; INTRAVENOUS at 14:14

## 2019-11-14 ASSESSMENT — ENCOUNTER SYMPTOMS
SORE THROAT: 0
NAUSEA: 1
NECK PAIN: 0
BLURRED VISION: 0
HEADACHES: 1
DOUBLE VISION: 0
DIZZINESS: 0
BACK PAIN: 0
PHOTOPHOBIA: 0
COUGH: 0
SINUS PAIN: 0
FEVER: 0

## 2019-11-14 NOTE — PROGRESS NOTES
"Subjective:      Estrada Toussaint is a 59 y.o. male who presents with Headache (pt states headache is raging upon awakening in the AM x 6 days)            Headache    This is a new problem. Episode onset: 6 days. The problem occurs constantly. The problem has been gradually worsening. The pain is located in the bilateral and frontal region. The pain does not radiate. The pain quality is similar to prior headaches. The quality of the pain is described as aching (\"swimming\"). The pain is at a severity of 6/10. The pain is moderate. Associated symptoms include nausea. Pertinent negatives include no back pain, blurred vision, coughing, dizziness, fever, hearing loss, neck pain, photophobia or sore throat. Associated symptoms comments: Patient denies visual changes, dizziness, feelings of unbalance.  Denies vomiting but has been nauseated x1 time during the past 6 days.  Also notes that he has had recent upper respiratory infection but has been feeling better.. Nothing aggravates the symptoms. He has tried NSAIDs (Caffeine) for the symptoms. The treatment provided no relief.       Review of Systems   Constitutional: Negative for fever.   HENT: Negative for hearing loss, sinus pain and sore throat.    Eyes: Negative for blurred vision, double vision and photophobia.   Respiratory: Negative for cough.    Gastrointestinal: Positive for nausea.   Musculoskeletal: Negative for back pain and neck pain.   Neurological: Positive for headaches. Negative for dizziness.       Past Medical History:   Diagnosis Date   • Hypothyroid 5/16/2013   • IBD (inflammatory bowel disease)    • Vitiligo 05/16/2013      Past Surgical History:   Procedure Laterality Date   • KNEE RECONSTRUCTION  1988    Right knee   [place]  ACL replacement       Social History     Socioeconomic History   • Marital status:      Spouse name: Not on file   • Number of children: Not on file   • Years of education: Not on file   • Highest education " "level: Not on file   Occupational History   • Not on file   Social Needs   • Financial resource strain: Not on file   • Food insecurity:     Worry: Not on file     Inability: Not on file   • Transportation needs:     Medical: Not on file     Non-medical: Not on file   Tobacco Use   • Smoking status: Former Smoker     Packs/day: 0.10     Years: 10.00     Pack years: 1.00     Types: Cigarettes     Last attempt to quit: 1986     Years since quittin.8   • Smokeless tobacco: Never Used   Substance and Sexual Activity   • Alcohol use: Yes     Alcohol/week: 0.0 oz     Comment: occasionally   • Drug use: No   • Sexual activity: Yes     Partners: Female     Birth control/protection: Post-Menopausal, Surgical     Comment: WIfe has had tubal ligation   Lifestyle   • Physical activity:     Days per week: Not on file     Minutes per session: Not on file   • Stress: Not on file   Relationships   • Social connections:     Talks on phone: Not on file     Gets together: Not on file     Attends [employer] service: Not on file     Active member of club or organization: Not on file     Attends meetings of clubs or organizations: Not on file     Relationship status: Not on file   • Intimate partner violence:     Fear of current or ex partner: Not on file     Emotionally abused: Not on file     Physically abused: Not on file     Forced sexual activity: Not on file   Other Topics Concern   • Not on file   Social History Narrative   • Not on file    Patient has no known allergies.       Objective:     /82 (BP Location: Left arm, Patient Position: Sitting, BP Cuff Size: Adult)   Pulse 80   Temp 36.3 °C (97.4 °F) (Temporal)   Resp 16   Ht 1.854 m (6' 1\")   Wt 74.4 kg (164 lb)   SpO2 99%   BMI 21.64 kg/m²      Physical Exam  Vitals signs reviewed.   Constitutional:       Appearance: Normal appearance.   HENT:      Right Ear: Tympanic membrane, ear canal and external ear normal.      Left Ear: Tympanic membrane, ear canal " and external ear normal.      Nose: Nose normal.      Mouth/Throat:      Mouth: Mucous membranes are moist.   Eyes:      Extraocular Movements: Extraocular movements intact.      Pupils: Pupils are equal, round, and reactive to light.   Neck:      Musculoskeletal: Normal range of motion.   Cardiovascular:      Rate and Rhythm: Normal rate and regular rhythm.      Heart sounds: Normal heart sounds.   Pulmonary:      Effort: Pulmonary effort is normal.      Breath sounds: Normal breath sounds.   Musculoskeletal: Normal range of motion.   Skin:     General: Skin is warm.      Capillary Refill: Capillary refill takes less than 2 seconds.   Neurological:      General: No focal deficit present.      Mental Status: He is alert and oriented to person, place, and time.      Cranial Nerves: Cranial nerves are intact.      Sensory: Sensation is intact.      Motor: Motor function is intact.      Coordination: Coordination is intact.      Gait: Gait is intact.   Psychiatric:         Mood and Affect: Mood normal.         Behavior: Behavior normal.         Thought Content: Thought content normal.         Judgment: Judgment normal.                 Assessment/Plan:       1. Migraine without aura and without status migrainosus, not intractable  - ketorolac (TORADOL) injection 30 mg - Administered in clinic, patient state pain is 2 out of 10 after administration.    2. Nausea  - ondansetron (ZOFRAN) 8 MG Tab; Take 1 Tab by mouth every 8 hours as needed for Nausea/Vomiting.  Dispense: 10 Tab; Refill: 0    Instructed patient he may take over-the-counter Excedrin Migraine instructed to increase fluids using electrolyte rich beverages.  May also take NSAIDs and Tylenol per 's instructions, avoiding NSAIDs for the next 24 hours due to Toradol injection today    Instructed patient to return to clinic for worsening symptoms or symptoms that persist for 7 to 10 days  Supportive care, differential diagnoses, and indications for  immediate follow-up discussed with patient.    Pathogenesis of diagnosis discussed including typical length and natural progression. Patient expresses understanding and agrees to plan.       Please note that this dictation was created using voice recognition software. I have made every reasonable attempt to correct obvious errors, but I expect that there are errors of grammar and possibly content that I did not discover before finalizing the note.

## 2019-11-20 ENCOUNTER — OFFICE VISIT (OUTPATIENT)
Dept: MEDICAL GROUP | Facility: PHYSICIAN GROUP | Age: 59
End: 2019-11-20
Payer: COMMERCIAL

## 2019-11-20 VITALS
HEIGHT: 73 IN | SYSTOLIC BLOOD PRESSURE: 122 MMHG | BODY MASS INDEX: 22.35 KG/M2 | RESPIRATION RATE: 12 BRPM | WEIGHT: 168.6 LBS | HEART RATE: 84 BPM | OXYGEN SATURATION: 99 % | DIASTOLIC BLOOD PRESSURE: 84 MMHG | TEMPERATURE: 97.9 F

## 2019-11-20 DIAGNOSIS — J01.40 SUBACUTE PANSINUSITIS: ICD-10-CM

## 2019-11-20 PROCEDURE — 99214 OFFICE O/P EST MOD 30 MIN: CPT | Performed by: FAMILY MEDICINE

## 2019-11-20 RX ORDER — AMOXICILLIN AND CLAVULANATE POTASSIUM 875; 125 MG/1; MG/1
1 TABLET, FILM COATED ORAL 2 TIMES DAILY
Qty: 20 TAB | Refills: 0 | Status: SHIPPED | OUTPATIENT
Start: 2019-11-20 | End: 2019-11-20 | Stop reason: SDUPTHER

## 2019-11-20 RX ORDER — AMOXICILLIN AND CLAVULANATE POTASSIUM 875; 125 MG/1; MG/1
1 TABLET, FILM COATED ORAL 2 TIMES DAILY
Qty: 20 TAB | Refills: 0 | Status: SHIPPED | OUTPATIENT
Start: 2019-11-20 | End: 2019-11-30

## 2019-11-20 ASSESSMENT — PATIENT HEALTH QUESTIONNAIRE - PHQ9: CLINICAL INTERPRETATION OF PHQ2 SCORE: 0

## 2019-11-20 NOTE — PROGRESS NOTES
Chief Complaint   Patient presents with   • Headache     x 10 days; uc fv    • Hypothyroidism     fv med       HISTORY OF PRESENT ILLNESS: Patient is a 59 y.o. male established patient here today for the following concerns:    1. Subacute pansinusitis  Here today with 10 + days of headaches, maxillary pain, retro-orbital and occipital.  Sometimes swimming feeling.  Was exposed to viral URI prior.  Was seen in UC and given Toradol with some improvement temoprarily.  Using excedrine migraine with some relief, but requiring it daily.       Past Medical, Social, and Family history reviewed and updated in EPIC    Allergies:Patient has no known allergies.    Current Outpatient Medications   Medication Sig Dispense Refill   • amoxicillin-clavulanate (AUGMENTIN) 875-125 MG Tab Take 1 Tab by mouth 2 times a day for 10 days. 20 Tab 0   • acetaminophen (TYLENOL) 500 MG Tab Take 500-1,000 mg by mouth every 6 hours as needed.     • ondansetron (ZOFRAN) 8 MG Tab Take 1 Tab by mouth every 8 hours as needed for Nausea/Vomiting. 10 Tab 0   • SYNTHROID 150 MCG Tab Take 1 Tab by mouth Every morning on an empty stomach. 90 Tab 3   • ibuprofen (MOTRIN) 200 MG Tab Take 200 mg by mouth every 6 hours as needed.     • Naproxen Sodium (ALEVE PO) Take  by mouth.     • meloxicam (MOBIC) 7.5 MG Tab Take 2 Tabs by mouth every day. (Patient not taking: Reported on 11/14/2019) 30 Tab 0   • hydrocortisone (ANUSOL-HC) 25 MG Suppos Insert 1 Suppository in rectum every 12 hours. (Patient not taking: Reported on 11/14/2019) 10 Suppository 1     No current facility-administered medications for this visit.          ROS:  Review of Systems   Constitutional: Negative for fever, chills, weight loss and malaise/fatigue.   HENT: Negative for ear pain, nosebleeds, congestion, sore throat and neck pain.    Eyes: Negative for blurred vision.   Respiratory: Negative for cough, sputum production, shortness of breath and wheezing.    Cardiovascular: Negative for  "chest pain, palpitations,  and leg swelling.   Gastrointestinal: Negative for heartburn, nausea, vomiting, diarrhea and abdominal pain.   Genitourinary: Negative for dysuria, urgency and frequency.   Musculoskeletal: Negative for myalgias, back pain and joint pain.   Skin: Negative for rash and itching.   Neurological: Negative for dizziness, tingling, tremors, sensory change, focal weakness and headaches.   Endo/Heme/Allergies: Does not bruise/bleed easily.   Psychiatric/Behavioral: Negative for depression, anxiety, suicidal ideas, insomnia and memory loss.      Exam:  /84   Pulse 84   Temp 36.6 °C (97.9 °F)   Resp 12   Ht 1.854 m (6' 1\")   Wt 76.5 kg (168 lb 9.6 oz)   SpO2 99%     General:  Well nourished, well developed in NAD  Head is grossly normal.  HEENT: TM clear bilaterally.  Significant PND. Nasal mucosa edematous with purulent nasal discharge.  Sinus tenderness on palpation.   Neck: Supple without JVD   Pulmonary:  Normal effort.   Cardiovascular: Regular rate  Extremities: no clubbing, cyanosis, or edema.  Psych: affect appropriate      Please note that this dictation was created using voice recognition software. I have made every reasonable attempt to correct obvious errors, but I expect that there are errors of grammar and possibly content that I did not discover before finalizing the note.    Assessment/Plan:  1. Subacute pansinusitis  Start   - amoxicillin-clavulanate (AUGMENTIN) 875-125 MG Tab; Take 1 Tab by mouth 2 times a day for 10 days.  Dispense: 20 Tab; Refill: 0            "

## 2019-12-11 ENCOUNTER — OFFICE VISIT (OUTPATIENT)
Dept: MEDICAL GROUP | Facility: PHYSICIAN GROUP | Age: 59
End: 2019-12-11
Payer: COMMERCIAL

## 2019-12-11 VITALS
HEART RATE: 72 BPM | BODY MASS INDEX: 22.66 KG/M2 | RESPIRATION RATE: 14 BRPM | SYSTOLIC BLOOD PRESSURE: 122 MMHG | TEMPERATURE: 97.2 F | OXYGEN SATURATION: 97 % | DIASTOLIC BLOOD PRESSURE: 80 MMHG | HEIGHT: 73 IN | WEIGHT: 171 LBS

## 2019-12-11 DIAGNOSIS — Z12.11 SCREENING FOR COLON CANCER: ICD-10-CM

## 2019-12-11 DIAGNOSIS — J32.9 RHINOSINUSITIS: ICD-10-CM

## 2019-12-11 PROCEDURE — 99213 OFFICE O/P EST LOW 20 MIN: CPT | Performed by: FAMILY MEDICINE

## 2019-12-11 RX ORDER — FLUTICASONE PROPIONATE 50 MCG
2 SPRAY, SUSPENSION (ML) NASAL DAILY
Qty: 16 G | Refills: 11 | Status: SHIPPED
Start: 2019-12-11 | End: 2019-12-11 | Stop reason: SDUPTHER

## 2019-12-11 RX ORDER — FLUTICASONE PROPIONATE 50 MCG
2 SPRAY, SUSPENSION (ML) NASAL DAILY
Qty: 16 G | Refills: 11 | Status: SHIPPED | OUTPATIENT
Start: 2019-12-11 | End: 2020-04-15 | Stop reason: SDUPTHER

## 2019-12-11 NOTE — PROGRESS NOTES
Chief Complaint   Patient presents with   • Sinus Problem   • Headache     fv        HISTORY OF PRESENT ILLNESS: Patient is a 59 y.o. male established patient here today for the following concerns:    1. Rhinosinusitis  Here for follow up on sinusitis and headaches.  Had significant improvement in his sinus pressure and headaches upon bending forward.  Started to get a little sinus pressure and retro-orbital pain and took OTC pseudophed with improvement. No fevers, chills.      2. Screening for colon cancer  Due for colon cancer screening.       Past Medical, Social, and Family history reviewed and updated in EPIC    Allergies:Patient has no known allergies.    Current Outpatient Medications   Medication Sig Dispense Refill   • fluticasone (FLONASE) 50 MCG/ACT nasal spray Spray 2 Sprays in nose every day. Each Nostril 16 g 11   • acetaminophen (TYLENOL) 500 MG Tab Take 500-1,000 mg by mouth every 6 hours as needed.     • ondansetron (ZOFRAN) 8 MG Tab Take 1 Tab by mouth every 8 hours as needed for Nausea/Vomiting. 10 Tab 0   • SYNTHROID 150 MCG Tab Take 1 Tab by mouth Every morning on an empty stomach. 90 Tab 3   • ibuprofen (MOTRIN) 200 MG Tab Take 200 mg by mouth every 6 hours as needed.     • Naproxen Sodium (ALEVE PO) Take  by mouth.     • meloxicam (MOBIC) 7.5 MG Tab Take 2 Tabs by mouth every day. (Patient not taking: Reported on 11/14/2019) 30 Tab 0   • hydrocortisone (ANUSOL-HC) 25 MG Suppos Insert 1 Suppository in rectum every 12 hours. (Patient not taking: Reported on 11/14/2019) 10 Suppository 1     No current facility-administered medications for this visit.          ROS:  Review of Systems   Constitutional: Negative for fever, chills, weight loss and malaise/fatigue.   HENT: Negative for ear pain, nosebleeds, congestion, sore throat and neck pain.    Eyes: Negative for blurred vision.   Respiratory: Negative for cough, sputum production, shortness of breath and wheezing.    Cardiovascular: Negative for  "chest pain, palpitations,  and leg swelling.   Gastrointestinal: Negative for heartburn, nausea, vomiting, diarrhea and abdominal pain.   Genitourinary: Negative for dysuria, urgency and frequency.   Musculoskeletal: Negative for myalgias, back pain and joint pain.   Skin: Negative for rash and itching.   Neurological: Negative for dizziness, tingling, tremors, sensory change, focal weakness and headaches.   Endo/Heme/Allergies: Does not bruise/bleed easily.   Psychiatric/Behavioral: Negative for depression, anxiety, suicidal ideas, insomnia and memory loss.      Exam:  /80   Pulse 72   Temp 36.2 °C (97.2 °F)   Resp 14   Ht 1.854 m (6' 1\")   Wt 77.6 kg (171 lb)   SpO2 97%     General:  Well nourished, well developed in NAD  Head is grossly normal.  HEENT: Normocephalic and atraumatic.  Oropharynx is clear without erythema and no tonsillar exudate. Nasal mucosa pink with minimal rhinorrhea, nasal passages are quite narrow  Neck: Supple without JVD   Pulmonary:  Normal effort.   Cardiovascular: Regular rate  Extremities: no clubbing, cyanosis, or edema.  Psych: affect appropriate      Please note that this dictation was created using voice recognition software. I have made every reasonable attempt to correct obvious errors, but I expect that there are errors of grammar and possibly content that I did not discover before finalizing the note.    Assessment/Plan:  1. Rhinosinusitis  Trial of  - fluticasone (FLONASE) 50 MCG/ACT nasal spray; Spray 2 Sprays in nose every day. Each Nostril  Dispense: 16 g; Refill: 11  May use the pseudaphed  Report any return of symptoms    2. Screening for colon cancer    - REFERRAL TO GASTROENTEROLOGY            "

## 2019-12-16 ENCOUNTER — TELEPHONE (OUTPATIENT)
Dept: MEDICAL GROUP | Facility: PHYSICIAN GROUP | Age: 59
End: 2019-12-16

## 2019-12-16 DIAGNOSIS — M17.31 POST-TRAUMATIC OSTEOARTHRITIS OF RIGHT KNEE: ICD-10-CM

## 2019-12-16 NOTE — TELEPHONE ENCOUNTER
----- Message from Estrada Toussaint sent at 2019  1:24 PM PST -----  Regarding: Non-Urgent Medical Question  Contact: [phone]  Hi,    I was just in for an appointment the other day and wish I would have brought it up then. But then thats the way it goes.  Any way I have been having issues with my right knee again and you refered me to [facility] a Dr Encarnacion last time. Well I wanted to schedule an appointment with him to discuss what can be done and they responded saying the referral has . Is this something you can fix so I can see him again?    Thank You Gerardo

## 2020-01-07 ENCOUNTER — OFFICE VISIT (OUTPATIENT)
Dept: MEDICAL GROUP | Facility: PHYSICIAN GROUP | Age: 60
End: 2020-01-07
Payer: COMMERCIAL

## 2020-01-07 VITALS
HEIGHT: 73 IN | RESPIRATION RATE: 16 BRPM | HEART RATE: 82 BPM | DIASTOLIC BLOOD PRESSURE: 82 MMHG | SYSTOLIC BLOOD PRESSURE: 124 MMHG | TEMPERATURE: 98.2 F | OXYGEN SATURATION: 97 % | BODY MASS INDEX: 22.66 KG/M2 | WEIGHT: 171 LBS

## 2020-01-07 DIAGNOSIS — M25.561 CHRONIC PAIN OF RIGHT KNEE: ICD-10-CM

## 2020-01-07 DIAGNOSIS — G89.29 CHRONIC PAIN OF RIGHT KNEE: ICD-10-CM

## 2020-01-07 DIAGNOSIS — M23.51 CHRONIC INSTABILITY OF RIGHT KNEE: ICD-10-CM

## 2020-01-07 PROCEDURE — 99214 OFFICE O/P EST MOD 30 MIN: CPT | Performed by: FAMILY MEDICINE

## 2020-01-07 ASSESSMENT — PATIENT HEALTH QUESTIONNAIRE - PHQ9: CLINICAL INTERPRETATION OF PHQ2 SCORE: 0

## 2020-01-07 NOTE — PROGRESS NOTES
Chief Complaint   Patient presents with   • Knee Pain     rt knee pain       HISTORY OF PRESENT ILLNESS: Patient is a 59 y.o. male established patient here today for the following concerns:    1. Chronic pain of right knee  2. Chronic instability of right knee  This is a pleasant 59-year-old gentleman with history of chronic right knee pain that was made worse in August of last year with activity.  He has history of ACL injury with repair on that side approximately 30 years ago.  He underwent some initial imaging which showed some chondromalacia chondrocalcinosis.  He was seen by sports medicine underwent corticosteroid injection with significant improvement for several weeks if not closer to a few months.  Progressively he has had more pain, popping stiffness.  He reports that when he bears weight and attempts to use his right leg to move up a step he feels some instability.  This does not feel like the same instability that he had with the ACL injury.  He would like to go back to see Dr. Zamudio and see what other options he has.  He has not noted any increase in effusion.  No redness.  No fevers or chills or overlying rashes.      Past Medical, Social, and Family history reviewed and updated in EPIC    Allergies:Patient has no known allergies.    Current Outpatient Medications   Medication Sig Dispense Refill   • fluticasone (FLONASE) 50 MCG/ACT nasal spray Spray 2 Sprays in nose every day. Each Nostril 16 g 11   • acetaminophen (TYLENOL) 500 MG Tab Take 500-1,000 mg by mouth every 6 hours as needed.     • ondansetron (ZOFRAN) 8 MG Tab Take 1 Tab by mouth every 8 hours as needed for Nausea/Vomiting. 10 Tab 0   • SYNTHROID 150 MCG Tab Take 1 Tab by mouth Every morning on an empty stomach. 90 Tab 3   • ibuprofen (MOTRIN) 200 MG Tab Take 200 mg by mouth every 6 hours as needed.     • Naproxen Sodium (ALEVE PO) Take  by mouth.       No current facility-administered medications for this visit.          ROS:  Review of  "Systems   Constitutional: Negative for fever, chills, weight loss and malaise/fatigue.   HENT: Negative for ear pain, nosebleeds, congestion, sore throat and neck pain.    Eyes: Negative for blurred vision.   Respiratory: Negative for cough, sputum production, shortness of breath and wheezing.    Cardiovascular: Negative for chest pain, palpitations,  and leg swelling.   Gastrointestinal: Negative for heartburn, nausea, vomiting, diarrhea and abdominal pain.   Genitourinary: Negative for dysuria, urgency and frequency.   Musculoskeletal: Negative for myalgias, back pain and + joint pain.   Skin: Negative for rash and itching.   Neurological: Negative for dizziness, tingling, tremors, sensory change, focal weakness and headaches.   Endo/Heme/Allergies: Does not bruise/bleed easily.   Psychiatric/Behavioral: Negative for depression, anxiety, suicidal ideas, insomnia and memory loss.      Exam:  /82 (BP Location: Left arm, Patient Position: Sitting, BP Cuff Size: Adult)   Pulse 82   Temp 36.8 °C (98.2 °F)   Resp 16   Ht 1.854 m (6' 1\")   Wt 77.6 kg (171 lb)   SpO2 97%     General:  Well nourished, well developed in NAD  Head is grossly normal.  Neck: Supple without JVD   Pulmonary:  Normal effort.   Cardiovascular: Regular rate  Extremities: no clubbing, cyanosis, or edema.  Psych: affect appropriate  Knee: no warmth, redness or rashes.  Full range of motion tenderness over the patellar tendon.  No ligamentous laxity is appreciated    Please note that this dictation was created using voice recognition software. I have made every reasonable attempt to correct obvious errors, but I expect that there are errors of grammar and possibly content that I did not discover before finalizing the note.    Assessment/Plan:  1. Chronic pain of right knee  - MR-KNEE-W/O RIGHT; Future  -Referral to sports medicine for further evaluation, will obtain MRI for further evaluation of meniscus and previous ACL repair given the " instability when climbing stairs.  In the meantime he will use ice after activity, nonsteroidal anti-inflammatory drugs with food, bracing with neoprene sleeve for activity if needed  2. Chronic instability of right knee    - MR-KNEE-W/O RIGHT; Future

## 2020-01-10 ENCOUNTER — OFFICE VISIT (OUTPATIENT)
Dept: MEDICAL GROUP | Facility: CLINIC | Age: 60
End: 2020-01-10
Payer: COMMERCIAL

## 2020-01-10 VITALS
BODY MASS INDEX: 22.66 KG/M2 | OXYGEN SATURATION: 96 % | TEMPERATURE: 97.9 F | SYSTOLIC BLOOD PRESSURE: 122 MMHG | HEIGHT: 73 IN | RESPIRATION RATE: 18 BRPM | DIASTOLIC BLOOD PRESSURE: 76 MMHG | HEART RATE: 82 BPM | WEIGHT: 171 LBS

## 2020-01-10 DIAGNOSIS — G89.29 CHRONIC PAIN OF RIGHT KNEE: ICD-10-CM

## 2020-01-10 DIAGNOSIS — M25.561 CHRONIC PAIN OF RIGHT KNEE: ICD-10-CM

## 2020-01-10 DIAGNOSIS — M17.31 POST-TRAUMATIC OSTEOARTHRITIS OF RIGHT KNEE: ICD-10-CM

## 2020-01-10 PROCEDURE — 99213 OFFICE O/P EST LOW 20 MIN: CPT | Mod: 25 | Performed by: FAMILY MEDICINE

## 2020-01-10 RX ORDER — TRIAMCINOLONE ACETONIDE 40 MG/ML
40 INJECTION, SUSPENSION INTRA-ARTICULAR; INTRAMUSCULAR ONCE
Status: COMPLETED | OUTPATIENT
Start: 2020-01-10 | End: 2020-01-10

## 2020-01-10 RX ADMIN — TRIAMCINOLONE ACETONIDE 40 MG: 40 INJECTION, SUSPENSION INTRA-ARTICULAR; INTRAMUSCULAR at 13:10

## 2020-01-10 NOTE — PROGRESS NOTES
"CHIEF COMPLAINT:    Right knee pain  Symptoms began approximately around mid June 2018  Pain is at the whole knee including posterior knee tension/pressure  Quality is aching, sharp,  constant dull  Pain is non-radiating   Improved with resting  Aggravated by long hikes, stairs  previous knee injury approximately 30 years ago, traumatic hyperextension injury causing ACL tear  No mechanical symptoms     PHYSICAL EXAM:  /76 (BP Location: Left arm, Patient Position: Sitting, BP Cuff Size: Adult)   Pulse 82   Temp 36.6 °C (97.9 °F) (Temporal)   Resp 18   Ht 1.854 m (6' 1\")   Wt 77.6 kg (171 lb)   SpO2 96%   BMI 22.56 kg/m²      well-developed, well-nourished in no apparent distress, alert and oriented x 3.  Gait: antalgic     RIGHT Knee:  Slight Varus and no swelling  Range of Motion Slightly limited with Flexion and Pain at extremes of motion  Trace effusion  Patellar No tenderness and no apprehension  Medial Joint Line Tenderness and POSITIVE Cathie's Cathie  Lateral Joint Line Non-tender and NEGATIVE Cathie  Trace Laxity with Varus stress  Trace Laxity with Valgus stress  Lachman's testing is 2+  Posterior Drawer Testing is Trace  The leg is otherwise neurovascularly intact    LEFT Knee:  Slight Varus and No Swelling   Range of Motion Intact  Trace effusion  Patellar No tenderness and no apprehension  Medial Joint Line Non-tender and NEGATIVE Cathie  Lateral Joint Line Non-tender and NEGATIVE Cathie  Trace Laxity with Varus stress  Trace Laxity with Valgus stress  Lachman's testing is Trace  Posterior Drawer Testing is Trace  The leg is otherwise neurovascularly intact    Additional Findings: None    1. Chronic pain of right knee     2. Post-traumatic osteoarthritis of right knee  triamcinolone acetonide (KENALOG-40) injection 40 mg    REFERRAL TO ORTHOPEDICS     Aspiration/injection of corticosteroid to the RIGHT knee at initial visit HELPED for approximately 3 months    Symptoms of " RECURRED    Corticosteroid injection with aspiration performed (September 4, 2018) which HELPED  REPEAT corticosteroid injection in the RIGHT knee performed in the office TODAY (January 10, 2020)    Return in about 4 weeks (around 2/7/2020).  To see how he is doing, If symptoms recur, consider Visco supplementation versus referral for consideration of total knee replacement            8/16/2018 3:39 PM    HISTORY/REASON FOR EXAM:  knee swelling, pain  Atraumatic Pain/Swelling/Deformity      TECHNIQUE/EXAM DESCRIPTION AND NUMBER OF VIEWS:  4 views of the RIGHT knee.    COMPARISON: None    FINDINGS:  No acute fracture or malalignment. Moderate-sized osteophytes project from the medial and lateral compartments as well as the posterior patella. There is at least moderate narrowing of the medial compartment with associated sclerosis.  There is a   moderate to large joint effusion.  There is chondrocalcinosis. There is evidence of prior ACL repair.     Impression       1.  Moderate to large knee effusion.    2.  Status post ACL repair.    3.  Moderate osteoarthropathy of the right knee joint.    4.  Nonspecific chondrocalcinosis. Findings may be related to CPPD.       Thank you Vic Rosa M.D. for allowing me to participate in caring for your patient.    ADDENDUM:  Patient has elected to proceed with orthopedic referral to discuss his surgical options for his osteoarthritis.  Referral placed

## 2020-01-10 NOTE — PROCEDURES
PROCEDURE NOTE:  right Knee corticosteroid injection  Risks and benefits discussed  Informed consent obtained  Knee prepped in sterile fashion utilizing a zelda- inferior approach  40 mg of Kenalog and 5 cc of bupivacaine injected into the knee joint space  Vapocoolant spray was utilized  Patient tolerated the procedure well  Postprocedure care and red flags discussed

## 2020-01-21 ENCOUNTER — APPOINTMENT (OUTPATIENT)
Dept: RADIOLOGY | Facility: MEDICAL CENTER | Age: 60
End: 2020-01-21
Attending: FAMILY MEDICINE
Payer: COMMERCIAL

## 2020-04-02 ENCOUNTER — PATIENT MESSAGE (OUTPATIENT)
Dept: MEDICAL GROUP | Facility: PHYSICIAN GROUP | Age: 60
End: 2020-04-02

## 2020-04-02 DIAGNOSIS — E03.9 HYPOTHYROIDISM, UNSPECIFIED TYPE: ICD-10-CM

## 2020-04-03 ENCOUNTER — HOSPITAL ENCOUNTER (OUTPATIENT)
Dept: LAB | Facility: MEDICAL CENTER | Age: 60
End: 2020-04-03
Attending: FAMILY MEDICINE
Payer: COMMERCIAL

## 2020-04-03 DIAGNOSIS — E03.9 HYPOTHYROIDISM, UNSPECIFIED TYPE: ICD-10-CM

## 2020-04-03 LAB
T3 SERPL-MCNC: 83.2 NG/DL (ref 60–181)
T4 FREE SERPL-MCNC: 1.88 NG/DL (ref 0.53–1.43)
TSH SERPL DL<=0.005 MIU/L-ACNC: 1.83 UIU/ML (ref 0.38–5.33)

## 2020-04-03 PROCEDURE — 84439 ASSAY OF FREE THYROXINE: CPT

## 2020-04-03 PROCEDURE — 84443 ASSAY THYROID STIM HORMONE: CPT

## 2020-04-03 PROCEDURE — 84480 ASSAY TRIIODOTHYRONINE (T3): CPT

## 2020-04-03 PROCEDURE — 36415 COLL VENOUS BLD VENIPUNCTURE: CPT

## 2020-04-07 ENCOUNTER — TELEPHONE (OUTPATIENT)
Dept: MEDICAL GROUP | Facility: PHYSICIAN GROUP | Age: 60
End: 2020-04-07

## 2020-04-07 DIAGNOSIS — E03.9 HYPOTHYROIDISM, UNSPECIFIED TYPE: ICD-10-CM

## 2020-04-07 DIAGNOSIS — E04.1 THYROID NODULE: ICD-10-CM

## 2020-04-07 NOTE — TELEPHONE ENCOUNTER
----- Message from Anahi Palmer M.D. sent at 4/7/2020 10:14 AM PDT -----  Thyroid looks pretty reasonable.  Recheck in about 4-6 weeks as the T4 is a little high.  No dose change yet.

## 2020-04-08 ENCOUNTER — OFFICE VISIT (OUTPATIENT)
Dept: MEDICAL GROUP | Facility: PHYSICIAN GROUP | Age: 60
End: 2020-04-08
Payer: COMMERCIAL

## 2020-04-08 VITALS — BODY MASS INDEX: 22.66 KG/M2 | HEIGHT: 73 IN | WEIGHT: 171 LBS

## 2020-04-08 DIAGNOSIS — E03.9 HYPOTHYROIDISM, UNSPECIFIED TYPE: ICD-10-CM

## 2020-04-08 PROCEDURE — 99213 OFFICE O/P EST LOW 20 MIN: CPT | Mod: CR,95 | Performed by: FAMILY MEDICINE

## 2020-04-08 ASSESSMENT — FIBROSIS 4 INDEX: FIB4 SCORE: 1.41

## 2020-04-08 NOTE — PROGRESS NOTES
Chief Complaint   Patient presents with   • Hypothyroidism     fv labs   • Anxiety   This encounter was conducted via Zoom.   Verbal consent was obtained. Patient's identity was verified.     Start: 11:19 AM   End: 11:34 AM    HISTORY OF PRESENT ILLNESS: Patient is a 60 y.o. male established patient here today for the following concerns:    Hypothyroidism, unspecified type  Patient had thyroid function testing completed on 4/3/2020 which show elevated Free T4 at 1.88. TSH and T3 are within normal limits. Patient reports feelings of anxiety, palpitations and chest pressure, which he is unsure whether they are attributed to his medications or stressful situational events. Patient notes that his symptoms have been off and on, and states one night he'll wake up in the middle of the night with night sweats, and the next night he will be asymptomatic. He states for the past two days, he feels that he has been asymptomatic and in his normal state of health. He is inquiring whether the brand of his medications correlates to his recent symptoms. Denies any past thyroid surgeries.     Past Medical, Social, and Family history reviewed and updated in EPIC    Allergies:Patient has no known allergies.    Current Outpatient Medications   Medication Sig Dispense Refill   • fluticasone (FLONASE) 50 MCG/ACT nasal spray Spray 2 Sprays in nose every day. Each Nostril 16 g 11   • acetaminophen (TYLENOL) 500 MG Tab Take 500-1,000 mg by mouth every 6 hours as needed.     • SYNTHROID 150 MCG Tab Take 1 Tab by mouth Every morning on an empty stomach. 90 Tab 3   • ibuprofen (MOTRIN) 200 MG Tab Take 200 mg by mouth every 6 hours as needed.     • Naproxen Sodium (ALEVE PO) Take  by mouth.     • ondansetron (ZOFRAN) 8 MG Tab Take 1 Tab by mouth every 8 hours as needed for Nausea/Vomiting. (Patient not taking: Reported on 4/8/2020) 10 Tab 0     No current facility-administered medications for this visit.          ROS:  Review of Systems  "  Constitutional: Negative for fever, chills, weight loss and malaise/fatigue.   HENT: Negative for ear pain, nosebleeds, congestion, sore throat and neck pain.    Eyes: Negative for blurred vision.   Respiratory: Negative for cough, sputum production, shortness of breath and wheezing.    Cardiovascular: Chest pressure, palpitations and night sweats, Negative for leg swelling.   Gastrointestinal: Negative for heartburn, nausea, vomiting, diarrhea and abdominal pain.   Genitourinary: Negative for dysuria, urgency and frequency.   Musculoskeletal: Negative for myalgias, back pain and joint pain.   Skin: Negative for rash and itching.   Neurological: Negative for dizziness, tingling, tremors, sensory change, focal weakness and headaches.   Endo/Heme/Allergies: Does not bruise/bleed easily.   Psychiatric/Behavioral: +Anxiety, Negative for depression, suicidal ideas, insomnia and memory loss.      Exam:  Ht 1.854 m (6' 1\")   Wt 77.6 kg (171 lb)   Patient states he is afebrile  General:  Well nourished, well developed in NAD  Head is grossly normal.  Neck: Supple without JVD no thyromegaly noted  Psych: affect appropriate      Please note that this dictation was created using voice recognition software. I have made every reasonable attempt to correct obvious errors, but I expect that there are errors of grammar and possibly content that I did not discover before finalizing the note.    Assessment/Plan:  1. Hypothyroidism, unspecified type  Thyroid function testing was completed on 4/3/2020 which demonstrate elevated Free T4 at 1.88. TSH and T3 are within normal limits. Patient reports periodic symptoms of palpitations, anxiety, and night sweats and I am concerned for possible Hashimoto's Disease. We will continue to monitor and continue current thyroid regimen. In the future, we will further evaluate with repeat labs and adjust therapy as needed.   - TSH; Standing  - TRIIDOTHYRONINE; Standing  - FREE THYROXINE; Standing "            I, Essence Lofton (Scribe), am scribing for, and in the presence of, Anahi Palmer M.D.    Electronically signed by: Essence Lofton (Scribe), 4/8/2020     IAnahi M.D. personally performed the services described in this documentation, as scribed by Essence Lofton in my presence, and it is both accurate and complete.

## 2020-04-14 DIAGNOSIS — J32.9 RHINOSINUSITIS: ICD-10-CM

## 2020-04-14 NOTE — TELEPHONE ENCOUNTER
*CHANGE IN PHARMACY*  Was the patient seen in the last year in this department? Yes    Does patient have an active prescription for medications requested? YES    Received Request Via: Pharmacy      Pt met protocol?: Yes    LAST OV 04/08/2020

## 2020-04-15 RX ORDER — FLUTICASONE PROPIONATE 50 MCG
2 SPRAY, SUSPENSION (ML) NASAL DAILY
Qty: 3 BOTTLE | Refills: 3 | Status: SHIPPED | OUTPATIENT
Start: 2020-04-15 | End: 2023-11-09

## 2020-10-09 DIAGNOSIS — E03.9 HYPOTHYROIDISM, UNSPECIFIED TYPE: ICD-10-CM

## 2020-10-12 RX ORDER — LEVOTHYROXINE SODIUM 150 MCG
TABLET ORAL
Qty: 90 TAB | Refills: 1 | Status: SHIPPED | OUTPATIENT
Start: 2020-10-12 | End: 2021-05-04 | Stop reason: SDUPTHER

## 2020-10-26 ENCOUNTER — OFFICE VISIT (OUTPATIENT)
Dept: MEDICAL GROUP | Facility: PHYSICIAN GROUP | Age: 60
End: 2020-10-26
Payer: COMMERCIAL

## 2020-10-26 ENCOUNTER — HOSPITAL ENCOUNTER (OUTPATIENT)
Dept: RADIOLOGY | Facility: MEDICAL CENTER | Age: 60
End: 2020-10-26
Attending: INTERNAL MEDICINE
Payer: COMMERCIAL

## 2020-10-26 VITALS
HEIGHT: 73 IN | BODY MASS INDEX: 22.13 KG/M2 | SYSTOLIC BLOOD PRESSURE: 122 MMHG | DIASTOLIC BLOOD PRESSURE: 84 MMHG | WEIGHT: 167 LBS | TEMPERATURE: 98.2 F | HEART RATE: 66 BPM

## 2020-10-26 DIAGNOSIS — M54.50 ACUTE MIDLINE LOW BACK PAIN WITHOUT SCIATICA: ICD-10-CM

## 2020-10-26 PROCEDURE — 72100 X-RAY EXAM L-S SPINE 2/3 VWS: CPT

## 2020-10-26 PROCEDURE — 99213 OFFICE O/P EST LOW 20 MIN: CPT | Performed by: INTERNAL MEDICINE

## 2020-10-26 ASSESSMENT — FIBROSIS 4 INDEX: FIB4 SCORE: 1.41

## 2020-10-26 NOTE — PROGRESS NOTES
CC: Back pain.    HPI:   Estrada presents today with the following.      1. Acute midline low back pain without sciatica  Patient is here today with complaints of ongoing acute low back pain for 1 week.  Patient has a long history of lumbago however this recent episode  lasted longer than usual.  He states that 1 week ago he was sweeping and reached with the broom and felt a twinge in his lower back.  Initially the pain was severe 10/10.  He rested and used heat to try to help alleviate the symptoms.  He is now at a pain level of 1/10, almost at baseline, however concerned that it took a week for him to recover.  Patient took over-the-counter aspirin.  Patient did not take Tylenol or ibuprofen.  Patient denies any bowel bladder dysfunction, denies any radicular pain.  Patient denies tripping or falling.  No other trauma.      Patient Active Problem List    Diagnosis Date Noted   • Midline low back pain without sciatica 10/26/2020   • Right knee pain 08/16/2018   • Dyslipidemia 07/29/2015   • Thyroid nodule 02/27/2015   • Vitiligo 05/16/2013   • Hypothyroid 05/16/2013   • History of ulcerative colitis 07/05/2012   • Depigmentation of skin 07/05/2012       Current Outpatient Medications   Medication Sig Dispense Refill   • SYNTHROID 150 MCG Tab TAKE 1 TABLET EVERY MORNING ON AN EMPTY STOMACH 90 Tab 1   • fluticasone (FLONASE) 50 MCG/ACT nasal spray Spray 2 Sprays in nose every day. Each Nostril 3 Bottle 3   • acetaminophen (TYLENOL) 500 MG Tab Take 500-1,000 mg by mouth every 6 hours as needed.     • ibuprofen (MOTRIN) 200 MG Tab Take 200 mg by mouth every 6 hours as needed.     • Naproxen Sodium (ALEVE PO) Take  by mouth.     • ondansetron (ZOFRAN) 8 MG Tab Take 1 Tab by mouth every 8 hours as needed for Nausea/Vomiting. (Patient not taking: Reported on 4/8/2020) 10 Tab 0     No current facility-administered medications for this visit.          Allergies as of 10/26/2020   • (No Known Allergies)        ROS: As per  "HPI.  Denies all other cardiopulmonary, GI, neurologic symptoms.    /84 (BP Location: Left arm, Patient Position: Sitting, BP Cuff Size: Adult)   Pulse 66   Temp 36.8 °C (98.2 °F) (Temporal)   Ht 1.854 m (6' 1\")   Wt 75.8 kg (167 lb)   BMI 22.03 kg/m²     Physical Exam:  Gen:         Alert and oriented, No apparent distress.  Neck:        No Lymphadenopathy or Bruits.  Lungs:     Clear to auscultation bilaterally, no wheezing rhonchi or crackles.  CV:          Regular rate and rhythm. No murmurs, rubs or gallops.  Abd:         Soft non tender, non distended. Normal active bowel sounds.    Ext:          No clubbing, cyanosis, edema.  Musculoskeletal: Good range of motion of lumbosacral spine on flexion and extension.  Negative straight leg raise.  Ambulating without difficulty.  No TTP to SI joint bilaterally.  No muscle spasms noted bilateral paravertebral muscle group.  Slight point tenderness to L4, L5.    Assessment and Plan.   60 y.o. male with the following issues.    1. Acute midline low back pain without sciatica  Stable and resolving symptoms.    - DX-LUMBAR SPINE-2 OR 3 VIEWS; Future            Please note that this dictation was created using voice recognition software. I have made every reasonable attempt to correct obvious errors, but expect that there are errors of grammar and possible content that I did not discover before finalizing note.   "

## 2020-11-10 ENCOUNTER — OFFICE VISIT (OUTPATIENT)
Dept: MEDICAL GROUP | Facility: PHYSICIAN GROUP | Age: 60
End: 2020-11-10
Payer: COMMERCIAL

## 2020-11-10 VITALS
OXYGEN SATURATION: 98 % | WEIGHT: 170.6 LBS | SYSTOLIC BLOOD PRESSURE: 114 MMHG | DIASTOLIC BLOOD PRESSURE: 72 MMHG | BODY MASS INDEX: 22.61 KG/M2 | HEART RATE: 74 BPM | HEIGHT: 73 IN | RESPIRATION RATE: 12 BRPM | TEMPERATURE: 97.1 F

## 2020-11-10 DIAGNOSIS — M54.50 LUMBAR BACK PAIN: ICD-10-CM

## 2020-11-10 DIAGNOSIS — R93.7 ABNORMAL X-RAY OF LUMBAR SPINE: ICD-10-CM

## 2020-11-10 PROCEDURE — 99214 OFFICE O/P EST MOD 30 MIN: CPT | Performed by: FAMILY MEDICINE

## 2020-11-10 RX ORDER — METHYLPREDNISOLONE 4 MG/1
TABLET ORAL
Qty: 21 TAB | Refills: 0 | Status: SHIPPED | OUTPATIENT
Start: 2020-11-10 | End: 2021-05-04

## 2020-11-10 ASSESSMENT — FIBROSIS 4 INDEX: FIB4 SCORE: 1.41

## 2020-11-10 NOTE — PROGRESS NOTES
Chief Complaint   Patient presents with   • Back Pain     fv        HISTORY OF PRESENT ILLNESS: Patient is a 60 y.o. male established patient here today for the following concerns:    1. Lumbar back pain  2. Abnormal x-ray of lumbar spine  This is a pleasant 60 year old male here today for concern over persistent back pain that started off like his typical back strain but has persisted despite antiinflamatory use, heat, ice, stretching.  He reports that he stepped of the porch to quickly grab a cat and felt a pop/snap and had sudden onset of spasm in the muscles around the back.  He reports that the spasms have subsided somewhat, but he did come in when it failed to improve and saw my partner, Dr. Aiken.  At that time did some imaging which demonstrated:         Narrative & Impression        10/26/2020 2:19 PM     HISTORY/REASON FOR EXAM:  Chronic low back pain        TECHNIQUE/ EXAM DESCRIPTION AND NUMBER OF VIEWS:  3 views of the lumbar spine.     COMPARISON: None.     FINDINGS:  The alignment of the lumbar spine is within normal limits.     There is degenerative disc space narrowing with endplate sclerosis and spurring at the L3-4, L4-5 and L5-S1 levels. There is bilateral facet arthropathy at these levels. There is slight endplate irregularity at the L3-4 level with ill-defined L4 superior   endplate.     SI joints and visualized pelvic structures are unremarkable.     There is mild aortic atherosclerosis.     IMPRESSION:     1.  There abnormality at the L3-4 through L5-S1 level, most likely degenerative disc disease. However, due to the slight irregularity of the L4 superior endplate, possibility of discitis or osteomyelitis could be considered depending on the clinical   scenario.             Last Resulted: 10/26/20  2:50 PM Order Details View Encounter Lab and Collection Details Routing Result Histor        He denies any previous trauma, car crashes or etc.    No fevers, chills or unintentional weight loss.   He does note that rotation of the lumbar spine does cause pressure like sensation to build up in the low back.     Past Medical, Social, and Family history reviewed and updated in EPIC    Allergies:Patient has no known allergies.    Current Outpatient Medications   Medication Sig Dispense Refill   • methylPREDNISolone (MEDROL DOSEPAK) 4 MG Tablet Therapy Pack As directed on the packaging label. 21 Tab 0   • SYNTHROID 150 MCG Tab TAKE 1 TABLET EVERY MORNING ON AN EMPTY STOMACH 90 Tab 1   • fluticasone (FLONASE) 50 MCG/ACT nasal spray Spray 2 Sprays in nose every day. Each Nostril 3 Bottle 3   • acetaminophen (TYLENOL) 500 MG Tab Take 500-1,000 mg by mouth every 6 hours as needed.     • ibuprofen (MOTRIN) 200 MG Tab Take 200 mg by mouth every 6 hours as needed.     • Naproxen Sodium (ALEVE PO) Take  by mouth.     • ondansetron (ZOFRAN) 8 MG Tab Take 1 Tab by mouth every 8 hours as needed for Nausea/Vomiting. (Patient not taking: Reported on 4/8/2020) 10 Tab 0     No current facility-administered medications for this visit.          ROS:  Review of Systems   Constitutional: Negative for fever, chills, weight loss and malaise/fatigue.   HENT: Negative for ear pain, nosebleeds, congestion, sore throat and neck pain.    Eyes: Negative for blurred vision.   Respiratory: Negative for cough, sputum production, shortness of breath and wheezing.    Cardiovascular: Negative for chest pain, palpitations,  and leg swelling.   Gastrointestinal: Negative for heartburn, nausea, vomiting, diarrhea and abdominal pain.   Genitourinary: Negative for dysuria, urgency and frequency.   Musculoskeletal: Negative for myalgias, +back pain and joint pain.   Skin: Negative for rash and itching.   Neurological: Negative for dizziness, tingling, tremors, sensory change, focal weakness and headaches.   Endo/Heme/Allergies: Does not bruise/bleed easily.   Psychiatric/Behavioral: Negative for depression, anxiety, suicidal ideas, insomnia and memory  "loss.      Exam:  /72   Pulse 74   Temp 36.2 °C (97.1 °F)   Resp 12   Ht 1.854 m (6' 1\")   Wt 77.4 kg (170 lb 9.6 oz)   SpO2 98%     General:  Well nourished, well developed in NAD  Head is grossly normal.  Neck: Supple without JVD   Pulmonary:  Normal effort.   Cardiovascular: Regular rate  Extremities: no clubbing, cyanosis, or edema.  Psych: affect appropriate  MSK: no midline tenderness, full ROM.     Please note that this dictation was created using voice recognition software. I have made every reasonable attempt to correct obvious errors, but I expect that there are errors of grammar and possibly content that I did not discover before finalizing the note.    Assessment/Plan:  1. Lumbar back pain  Abnormal Xrays of L3-4.  Possible discitis.  Will start steroids and obtain MRI.   - MR-LUMBAR SPINE-WITH & W/O; Future  - methylPREDNISolone (MEDROL DOSEPAK) 4 MG Tablet Therapy Pack; As directed on the packaging label.  Dispense: 21 Tab; Refill: 0    2. Abnormal x-ray of lumbar spine  - MR-LUMBAR SPINE-WITH & W/O; Future  - methylPREDNISolone (MEDROL DOSEPAK) 4 MG Tablet Therapy Pack; As directed on the packaging label.  Dispense: 21 Tab; Refill: 0    Follow up after MRI>         "

## 2021-03-15 DIAGNOSIS — Z23 NEED FOR VACCINATION: ICD-10-CM

## 2021-05-04 ENCOUNTER — OFFICE VISIT (OUTPATIENT)
Dept: MEDICAL GROUP | Facility: PHYSICIAN GROUP | Age: 61
End: 2021-05-04
Payer: COMMERCIAL

## 2021-05-04 VITALS
TEMPERATURE: 97.4 F | SYSTOLIC BLOOD PRESSURE: 112 MMHG | HEART RATE: 72 BPM | BODY MASS INDEX: 22 KG/M2 | DIASTOLIC BLOOD PRESSURE: 78 MMHG | RESPIRATION RATE: 16 BRPM | HEIGHT: 73 IN | OXYGEN SATURATION: 99 % | WEIGHT: 166 LBS

## 2021-05-04 DIAGNOSIS — E03.9 HYPOTHYROIDISM, UNSPECIFIED TYPE: ICD-10-CM

## 2021-05-04 DIAGNOSIS — M54.16 LUMBAR RADICULOPATHY: ICD-10-CM

## 2021-05-04 DIAGNOSIS — G89.29 CHRONIC MIDLINE LOW BACK PAIN WITH LEFT-SIDED SCIATICA: ICD-10-CM

## 2021-05-04 DIAGNOSIS — Z12.11 COLON CANCER SCREENING: ICD-10-CM

## 2021-05-04 DIAGNOSIS — Z23 NEED FOR VACCINATION: ICD-10-CM

## 2021-05-04 DIAGNOSIS — E78.5 DYSLIPIDEMIA: Chronic | ICD-10-CM

## 2021-05-04 DIAGNOSIS — Z00.00 WELL ADULT EXAM: ICD-10-CM

## 2021-05-04 DIAGNOSIS — E04.1 THYROID NODULE: Chronic | ICD-10-CM

## 2021-05-04 DIAGNOSIS — Z87.19 HISTORY OF ULCERATIVE COLITIS: Chronic | ICD-10-CM

## 2021-05-04 DIAGNOSIS — M54.42 CHRONIC MIDLINE LOW BACK PAIN WITH LEFT-SIDED SCIATICA: ICD-10-CM

## 2021-05-04 PROBLEM — M54.50 MIDLINE LOW BACK PAIN WITHOUT SCIATICA: Status: RESOLVED | Noted: 2020-10-26 | Resolved: 2021-05-04

## 2021-05-04 PROCEDURE — 99214 OFFICE O/P EST MOD 30 MIN: CPT | Mod: 25 | Performed by: INTERNAL MEDICINE

## 2021-05-04 PROCEDURE — 90471 IMMUNIZATION ADMIN: CPT | Performed by: INTERNAL MEDICINE

## 2021-05-04 PROCEDURE — 90750 HZV VACC RECOMBINANT IM: CPT | Performed by: INTERNAL MEDICINE

## 2021-05-04 RX ORDER — LEVOTHYROXINE SODIUM 150 MCG
TABLET ORAL
Qty: 90 TABLET | Refills: 3 | Status: SHIPPED | OUTPATIENT
Start: 2021-05-04 | End: 2022-04-22 | Stop reason: SDUPTHER

## 2021-05-04 ASSESSMENT — PATIENT HEALTH QUESTIONNAIRE - PHQ9: CLINICAL INTERPRETATION OF PHQ2 SCORE: 0

## 2021-05-04 ASSESSMENT — FIBROSIS 4 INDEX: FIB4 SCORE: 1.43

## 2021-05-04 NOTE — PROGRESS NOTES
CC: Low back pain      HPI: This is a 61 y.o. pt.  Pt's medical history is notable for:     Dyslipidemia  Patient currently on diet therapy.  Lipid panel ordered for follow-up.    History of ulcerative colitis  Patient is due to see GI specialist.  Referral submitted today.  Patient denies fever chills abdominal pain GI bleeding.  Per patient report his symptoms are well controlled with diet    Hypothyroid  Currently taking Synthroid.  The patient is due for lab test.    Thyroid nodule  Chart review showed patient had ultrasound done 2014 show a 7 mm right thyroid nodule.  New ultrasound order submitted for follow-up.    Chronic midline low back pain with left-sided sciatica  This is a chronic condition per symptom noted for several months.  The pain is intermittent and can be severe at time.  The patient takes over-the-counter ibuprofen as needed.  The pain does radiate onto the left lower extremity associated with intermittent numbness/tingling sensation.  Patient denied bowel or bladder dysfunction.  No fever chills or unexplained weight loss.  No history of night sweats.    Previous x-ray showed  IMPRESSION:     1.  There abnormality at the L3-4 through L5-S1 level, most likely degenerative disc disease. However, due to the slight irregularity of the L4 superior endplate, possibility of discitis or osteomyelitis could be considered depending on the clinical   scenario.          REVIEW OF SYSTEMS:     Constitutional:  no fever / chills   Neurologic: no headaches  Eyes: no changes in vision  ENT: no sore throat, no hearing loss  CV:  no chest pain, no palpitations  Pulmonary: no SOB, no cough          Allergies: Patient has no known allergies.    Current Outpatient Medications Ordered in Epic   Medication Sig Dispense Refill   • SYNTHROID 150 MCG Tab TAKE 1 TABLET EVERY MORNING ON AN EMPTY STOMACH 90 tablet 3   • fluticasone (FLONASE) 50 MCG/ACT nasal spray Spray 2 Sprays in nose every day. Each Nostril 3 Bottle 3    • acetaminophen (TYLENOL) 500 MG Tab Take 500-1,000 mg by mouth every 6 hours as needed.     • ibuprofen (MOTRIN) 200 MG Tab Take 200 mg by mouth every 6 hours as needed.       No current Epic-ordered facility-administered medications on file.       Past Medical, Social, and Family history reviewed and updated in EPIC     ------------------------------------------------------------------------------     PHYSICAL EXAM:   Vitals:    05/04/21 0806   BP: 112/78   Pulse: 72   Resp: 16   Temp: 36.3 °C (97.4 °F)   SpO2: 99%      Body mass index is 21.9 kg/m².         Constitutional: no acute distress  Neck: supple, no JVD  CV: heart RRR  Resp: normal effort, no wheezing or rales.  GI: abdomen soft, no obvious mass, no tenderness  Neuro: CN 2-12 grossly intact  Low back: No significant spinal deformity noted.   Pain noted w palpation of the lumbar region.  ROM : flexion, extension, rotation, lateral bend of back limited due to pain          -----------------------------------------------------------------------------    ASSESSMENT:   1. Chronic midline low back pain with left-sided sciatica  MR-LUMBAR SPINE-W/O   2. Lumbar radiculopathy  MR-LUMBAR SPINE-W/O   3. Thyroid nodule  US-THYROID   4. Colon cancer screening  REFERRAL TO GI FOR COLONOSCOPY   5. Need for vaccination  Shingrix Vaccine   6. Hypothyroidism, unspecified type  SYNTHROID 150 MCG Tab    TSH   7. History of ulcerative colitis     8. Well adult exam  HEMOGLOBIN A1C    ALANINE AMINO-TRANS    Basic Metabolic Panel    CBC WITHOUT DIFFERENTIAL    Lipid Profile    PROSTATE SPECIFIC AG SCREENING    MICROALBUMIN CREAT RATIO URINE   9. Dyslipidemia             MEDICAL DECISION MAKING: DISCUSSION / STATUS / PLAN:    Chronic low back pain/lumbar radiculopathy.  Patient has tried conservative management without improvement.  MRI of the LS spine requested for further evaluation.  Recommend follow-up after imaging done    Hypothyroidism/thyroid nodule  TSH and thyroid  ultrasound ordered.  Refill Synthroid.    History of ulcerative colitis.  The patient asymptomatic.  The patient is due for colonoscopy.  Refer to GI.    Hyperlipidemia.  Lipid panel requested.  Recommend low-fat low-cholesterol diet.       Return in about 6 months (around 11/4/2021).     -If any problems should arise, patient was advised to contact our office or go to ER to be evaluated.    Please note that this dictation was created using voice recognition software. I have made every reasonable attempt to correct obvious errors, but it is possible there are errors of grammar and possibly content that I did not discover before finalizing the note.

## 2021-05-04 NOTE — ASSESSMENT & PLAN NOTE
Chart review showed patient had ultrasound done 2014 show a 7 mm right thyroid nodule.  New ultrasound order submitted for follow-up.

## 2021-05-04 NOTE — ASSESSMENT & PLAN NOTE
This is a chronic condition per symptom noted for several months.  The pain is intermittent and can be severe at time.  The patient takes over-the-counter ibuprofen as needed.  The pain does radiate onto the left lower extremity associated with intermittent numbness/tingling sensation.  Patient denied bowel or bladder dysfunction.  No fever chills or unexplained weight loss.  No history of night sweats.    Previous x-ray showed  IMPRESSION:     1.  There abnormality at the L3-4 through L5-S1 level, most likely degenerative disc disease. However, due to the slight irregularity of the L4 superior endplate, possibility of discitis or osteomyelitis could be considered depending on the clinical   scenario.

## 2021-05-04 NOTE — ASSESSMENT & PLAN NOTE
Patient is due to see GI specialist.  Referral submitted today.  Patient denies fever chills abdominal pain GI bleeding.  Per patient report his symptoms are well controlled with diet

## 2021-05-17 ENCOUNTER — HOSPITAL ENCOUNTER (OUTPATIENT)
Dept: LAB | Facility: MEDICAL CENTER | Age: 61
End: 2021-05-17
Attending: INTERNAL MEDICINE
Payer: COMMERCIAL

## 2021-05-17 DIAGNOSIS — E03.9 HYPOTHYROIDISM, UNSPECIFIED TYPE: ICD-10-CM

## 2021-05-17 DIAGNOSIS — Z00.00 WELL ADULT EXAM: ICD-10-CM

## 2021-05-17 LAB
ALT SERPL-CCNC: 24 U/L (ref 2–50)
ANION GAP SERPL CALC-SCNC: 7 MMOL/L (ref 7–16)
BUN SERPL-MCNC: 12 MG/DL (ref 8–22)
CALCIUM SERPL-MCNC: 9.1 MG/DL (ref 8.5–10.5)
CHLORIDE SERPL-SCNC: 104 MMOL/L (ref 96–112)
CHOLEST SERPL-MCNC: 198 MG/DL (ref 100–199)
CO2 SERPL-SCNC: 30 MMOL/L (ref 20–33)
CREAT SERPL-MCNC: 1.06 MG/DL (ref 0.5–1.4)
CREAT UR-MCNC: 213.88 MG/DL
ERYTHROCYTE [DISTWIDTH] IN BLOOD BY AUTOMATED COUNT: 40.9 FL (ref 35.9–50)
EST. AVERAGE GLUCOSE BLD GHB EST-MCNC: 103 MG/DL
FASTING STATUS PATIENT QL REPORTED: NORMAL
GLUCOSE SERPL-MCNC: 88 MG/DL (ref 65–99)
HBA1C MFR BLD: 5.2 % (ref 4–5.6)
HCT VFR BLD AUTO: 47.4 % (ref 42–52)
HDLC SERPL-MCNC: 39 MG/DL
HGB BLD-MCNC: 16 G/DL (ref 14–18)
LDLC SERPL CALC-MCNC: 139 MG/DL
MCH RBC QN AUTO: 29.5 PG (ref 27–33)
MCHC RBC AUTO-ENTMCNC: 33.8 G/DL (ref 33.7–35.3)
MCV RBC AUTO: 87.3 FL (ref 81.4–97.8)
MICROALBUMIN UR-MCNC: <1.2 MG/DL
MICROALBUMIN/CREAT UR: NORMAL MG/G (ref 0–30)
PLATELET # BLD AUTO: 185 K/UL (ref 164–446)
PMV BLD AUTO: 9.3 FL (ref 9–12.9)
POTASSIUM SERPL-SCNC: 4.6 MMOL/L (ref 3.6–5.5)
PSA SERPL-MCNC: 2.34 NG/ML (ref 0–4)
RBC # BLD AUTO: 5.43 M/UL (ref 4.7–6.1)
SODIUM SERPL-SCNC: 141 MMOL/L (ref 135–145)
TRIGL SERPL-MCNC: 99 MG/DL (ref 0–149)
TSH SERPL DL<=0.005 MIU/L-ACNC: 3.54 UIU/ML (ref 0.38–5.33)
WBC # BLD AUTO: 6.2 K/UL (ref 4.8–10.8)

## 2021-05-17 PROCEDURE — 85027 COMPLETE CBC AUTOMATED: CPT

## 2021-05-17 PROCEDURE — 82570 ASSAY OF URINE CREATININE: CPT

## 2021-05-17 PROCEDURE — 83036 HEMOGLOBIN GLYCOSYLATED A1C: CPT

## 2021-05-17 PROCEDURE — 36415 COLL VENOUS BLD VENIPUNCTURE: CPT

## 2021-05-17 PROCEDURE — 84443 ASSAY THYROID STIM HORMONE: CPT

## 2021-05-17 PROCEDURE — 84153 ASSAY OF PSA TOTAL: CPT

## 2021-05-17 PROCEDURE — 80048 BASIC METABOLIC PNL TOTAL CA: CPT

## 2021-05-17 PROCEDURE — 80061 LIPID PANEL: CPT

## 2021-05-17 PROCEDURE — 82043 UR ALBUMIN QUANTITATIVE: CPT

## 2021-05-17 PROCEDURE — 84460 ALANINE AMINO (ALT) (SGPT): CPT

## 2021-05-27 ENCOUNTER — HOSPITAL ENCOUNTER (OUTPATIENT)
Dept: RADIOLOGY | Facility: MEDICAL CENTER | Age: 61
End: 2021-05-27
Attending: INTERNAL MEDICINE
Payer: COMMERCIAL

## 2021-05-27 DIAGNOSIS — G89.29 CHRONIC MIDLINE LOW BACK PAIN WITH LEFT-SIDED SCIATICA: ICD-10-CM

## 2021-05-27 DIAGNOSIS — M54.16 LUMBAR RADICULOPATHY: ICD-10-CM

## 2021-05-27 DIAGNOSIS — M54.42 CHRONIC MIDLINE LOW BACK PAIN WITH LEFT-SIDED SCIATICA: ICD-10-CM

## 2021-05-27 DIAGNOSIS — E04.1 THYROID NODULE: ICD-10-CM

## 2021-05-27 PROCEDURE — 72148 MRI LUMBAR SPINE W/O DYE: CPT

## 2021-05-27 PROCEDURE — 76536 US EXAM OF HEAD AND NECK: CPT

## 2021-05-28 DIAGNOSIS — G89.29 CHRONIC MIDLINE LOW BACK PAIN WITH LEFT-SIDED SCIATICA: ICD-10-CM

## 2021-05-28 DIAGNOSIS — M54.42 CHRONIC MIDLINE LOW BACK PAIN WITH LEFT-SIDED SCIATICA: ICD-10-CM

## 2021-07-19 ENCOUNTER — TELEPHONE (OUTPATIENT)
Dept: MEDICAL GROUP | Facility: PHYSICIAN GROUP | Age: 61
End: 2021-07-19

## 2021-07-19 NOTE — TELEPHONE ENCOUNTER
FINAL PRIOR AUTHORIZATION STATUS:    1.  Name of Medication & Dose: Synthroid      2. Prior Auth Status: Approved through drug is covered by current plan     3. Action Taken: Pharmacy Notified: yes Patient Notified: no

## 2021-08-05 ENCOUNTER — OFFICE VISIT (OUTPATIENT)
Dept: MEDICAL GROUP | Facility: PHYSICIAN GROUP | Age: 61
End: 2021-08-05
Payer: COMMERCIAL

## 2021-08-05 VITALS
DIASTOLIC BLOOD PRESSURE: 78 MMHG | HEART RATE: 85 BPM | SYSTOLIC BLOOD PRESSURE: 112 MMHG | RESPIRATION RATE: 16 BRPM | BODY MASS INDEX: 21.47 KG/M2 | HEIGHT: 73 IN | TEMPERATURE: 98.3 F | WEIGHT: 162 LBS | OXYGEN SATURATION: 95 %

## 2021-08-05 DIAGNOSIS — E78.5 DYSLIPIDEMIA: Chronic | ICD-10-CM

## 2021-08-05 DIAGNOSIS — E03.9 ACQUIRED HYPOTHYROIDISM: Chronic | ICD-10-CM

## 2021-08-05 DIAGNOSIS — M26.621 ARTHRALGIA OF RIGHT TEMPOROMANDIBULAR JOINT: ICD-10-CM

## 2021-08-05 PROCEDURE — 99214 OFFICE O/P EST MOD 30 MIN: CPT | Performed by: INTERNAL MEDICINE

## 2021-08-05 ASSESSMENT — FIBROSIS 4 INDEX: FIB4 SCORE: 0.93

## 2021-08-05 NOTE — ASSESSMENT & PLAN NOTE
This is a chronic condition. Patient presently taking Synthroid. Recent TSH is within the normal range.

## 2021-08-05 NOTE — PROGRESS NOTES
"CC: Jaw pain      HPI: This is a 61 y.o. pt.  Pt's medical history is notable for:       Right jaw pain    This is a new condition noticed since last several weeks. Patient complaining of right jaw pain. He denies any recent trauma or injury. Occasional right ear pain noted. He denies any fever or chills denies any change in his hearing or tinnitus    Dyslipidemia  Chronic condition patient currently on diet therapy.    Hypothyroid  This is a chronic condition. Patient presently taking Synthroid. Recent TSH is within the normal range.          REVIEW OF SYSTEMS:     Constitutional:  no fever / chills   Neurologic: no headaches  Eyes: no changes in vision  ENT: no sore throat, no hearing loss  CV:  no chest pain, no palpitations  Pulmonary: no SOB, no cough          Allergies: Patient has no known allergies.    Current Outpatient Medications Ordered in Epic   Medication Sig Dispense Refill   • SYNTHROID 150 MCG Tab TAKE 1 TABLET EVERY MORNING ON AN EMPTY STOMACH 90 tablet 3   • fluticasone (FLONASE) 50 MCG/ACT nasal spray Spray 2 Sprays in nose every day. Each Nostril 3 Bottle 3   • acetaminophen (TYLENOL) 500 MG Tab Take 500-1,000 mg by mouth every 6 hours as needed.     • ibuprofen (MOTRIN) 200 MG Tab Take 200 mg by mouth every 6 hours as needed.       No current Epic-ordered facility-administered medications on file.       Past Medical, Social, and Family history reviewed and updated in EPIC     ------------------------------------------------------------------------------     PHYSICAL EXAM:   Vitals:    08/05/21 1540   BP: 112/78   Pulse: 85   Resp: 16   Temp: 36.8 °C (98.3 °F)   SpO2: 95%        Vitals:    08/05/21 1540   BP: 112/78   Weight: 73.5 kg (162 lb)   Height: 1.854 m (6' 1\")         Body mass index is 21.37 kg/m².    Constitutional: no acute distress  Right ear exam external canal is clear thin membrane intact no acute abnormality noted  Right jaw no swelling redness or deformity noted. Moderate pain " noted with palpation over the TMJ pain is worse with jaw opening and closing and pain is reproduced with palpation  CV: heart RRR  Resp: normal effort, no wheezing or rales.  GI: abdomen soft, no obvious mass, no tenderness  Neuro: CN 2-12 grossly intact        -----------------------------------------------------------------------------    ASSESSMENT:   1. Arthralgia of right temporomandibular joint  REFERRAL TO TMJ PAIN CLINIC   2. Dyslipidemia     3. Acquired hypothyroidism             MEDICAL DECISION MAKING: DISCUSSION / STATUS / PLAN:    Right-sided TMJ.  Refer the patient to TMJ clinic.  Advised the patient to contact his dentist to obtain a nightguard  Avoid chewing hard candies gums or chewy foods  May take Motrin PN for pain  Consider ice/heat  Follow-up if not better.    Hyperlipidemia. Recommend low-fat low-cholesterol diet continue exercise regularly    Hypothyroidism. TSH within normal limits recently. Continue with Synthroid.    Health maintenance. Recommend Covid vaccine and shingles vaccine. The patient declined. Reminded the patient that he is due for colonoscopy. We order colonoscopy previously. Advised the patient to schedule an appointment with GI specialist.      -If any problems should arise, patient was advised to contact our office or go to ER to be evaluated.    Please note that this dictation was created using voice recognition software. I have made every reasonable attempt to correct obvious errors, but it is possible there are errors of grammar and possibly content that I did not discover before finalizing the note.

## 2021-08-05 NOTE — ASSESSMENT & PLAN NOTE
This is a new condition noticed since last several weeks. Patient complaining of right jaw pain. He denies any recent trauma or injury. Occasional right ear pain noted. He denies any fever or chills denies any change in his hearing or tinnitus

## 2022-04-23 DIAGNOSIS — E03.9 ACQUIRED HYPOTHYROIDISM: ICD-10-CM

## 2022-05-10 DIAGNOSIS — Z00.00 WELL ADULT EXAM: ICD-10-CM

## 2022-05-13 LAB — TSH SERPL DL<=0.005 MIU/L-ACNC: 2.76 UIU/ML (ref 0.45–4.5)

## 2022-05-14 ENCOUNTER — OFFICE VISIT (OUTPATIENT)
Dept: URGENT CARE | Facility: PHYSICIAN GROUP | Age: 62
End: 2022-05-14
Payer: COMMERCIAL

## 2022-05-14 VITALS
HEART RATE: 85 BPM | TEMPERATURE: 98.3 F | HEIGHT: 72 IN | SYSTOLIC BLOOD PRESSURE: 116 MMHG | OXYGEN SATURATION: 98 % | DIASTOLIC BLOOD PRESSURE: 84 MMHG | WEIGHT: 163 LBS | BODY MASS INDEX: 22.08 KG/M2 | RESPIRATION RATE: 16 BRPM

## 2022-05-14 DIAGNOSIS — H65.193 ACUTE MEE (MIDDLE EAR EFFUSION), BILATERAL: ICD-10-CM

## 2022-05-14 DIAGNOSIS — H61.22 IMPACTED CERUMEN, LEFT EAR: ICD-10-CM

## 2022-05-14 PROCEDURE — 99213 OFFICE O/P EST LOW 20 MIN: CPT | Performed by: NURSE PRACTITIONER

## 2022-05-14 RX ORDER — METHYLPREDNISOLONE 4 MG/1
4 TABLET ORAL DAILY
Qty: 21 TABLET | Refills: 0 | Status: SHIPPED | OUTPATIENT
Start: 2022-05-14 | End: 2022-05-21

## 2022-05-14 ASSESSMENT — ENCOUNTER SYMPTOMS
HEADACHES: 1
DIZZINESS: 1
NAUSEA: 1

## 2022-05-14 NOTE — PROGRESS NOTES
Subjective:     Estrada Toussaint is a 62 y.o. male who presents for Otalgia ( Bilateral ear pan. Onset 13 hour. )      Otalgia   Associated symptoms include headaches and hearing loss. Pertinent negatives include no ear discharge.     Pt presents for evaluation of a new problem.  Estrada is a pleasant 62-year-old male who presents to urgent care today with complaints of bilateral ear discomfort, decreased hearing, ringing of bilateral ears, nausea and dizziness that has been ongoing for the past week.  His symptoms have rapidly worsened in the past day.  He has not used anything for the relief of his symptoms.  He denies any recent URI symptoms including congestion, sore throat and cough.   His dizziness does worsen with rapid position change.   Review of Systems   HENT: Positive for hearing loss and tinnitus. Negative for ear discharge and ear pain.    Gastrointestinal: Positive for nausea.   Neurological: Positive for dizziness and headaches.       PMH:   Past Medical History:   Diagnosis Date   • Hypothyroid 2013   • IBD (inflammatory bowel disease)    • Midline low back pain without sciatica 10/26/2020   • Vitiligo 2013     ALLERGIES: No Known Allergies  SURGHX:   Past Surgical History:   Procedure Laterality Date   • KNEE RECONSTRUCTION      Right knee   [place]  ACL replacement      SOCHX:   Social History     Socioeconomic History   • Marital status:    Tobacco Use   • Smoking status: Former Smoker     Packs/day: 0.10     Years: 10.00     Pack years: 1.00     Types: Cigarettes     Quit date: 1986     Years since quittin.3   • Smokeless tobacco: Never Used   Vaping Use   • Vaping Use: Never used   Substance and Sexual Activity   • Alcohol use: Yes     Comment: 0-1 monthly   • Drug use: No   • Sexual activity: Yes     Partners: Female     Birth control/protection: Post-Menopausal, Surgical     Comment: WIfe has had tubal ligation     FH:   Family History   Problem Relation  Age of Onset   • Cancer Mother         Breast Cancer--metastatic   • Heart Disease Father    • Stroke Father    • Stroke Brother    • Stroke Sister         aneursym         Objective:   /84 (BP Location: Left arm, Patient Position: Sitting, BP Cuff Size: Adult)   Pulse 85   Temp 36.8 °C (98.3 °F) (Temporal)   Resp 16   Ht 1.829 m (6')   Wt 73.9 kg (163 lb)   SpO2 98%   BMI 22.11 kg/m²     Physical Exam  Vitals and nursing note reviewed.   Constitutional:       General: He is not in acute distress.     Appearance: Normal appearance. He is normal weight. He is not ill-appearing or toxic-appearing.   HENT:      Head: Normocephalic.      Right Ear: Ear canal and external ear normal. There is no impacted cerumen.      Left Ear: Ear canal and external ear normal. There is impacted cerumen.      Ears:      Comments: Positive for bilateral ear effusion.  Negative for erythema or pain with palpation.  Ear lavage performed by medical assistant of left ear.  He does note good relief of symptoms following removal of impacted cerumen.     Nose: Nose normal.      Mouth/Throat:      Mouth: Mucous membranes are moist.   Eyes:      General:         Right eye: No discharge.         Left eye: No discharge.      Extraocular Movements: Extraocular movements intact.      Conjunctiva/sclera: Conjunctivae normal.      Pupils: Pupils are equal, round, and reactive to light.   Pulmonary:      Effort: Pulmonary effort is normal.      Breath sounds: Normal breath sounds.   Abdominal:      General: Abdomen is flat.   Musculoskeletal:         General: Normal range of motion.      Cervical back: Normal range of motion and neck supple. No rigidity.   Lymphadenopathy:      Cervical: No cervical adenopathy.   Skin:     General: Skin is warm and dry.   Neurological:      General: No focal deficit present.      Mental Status: He is alert and oriented to person, place, and time. Mental status is at baseline.   Psychiatric:         Mood and  Affect: Mood normal.         Behavior: Behavior normal.         Judgment: Judgment normal.         Assessment/Plan:   Assessment    1. Acute CORNELL (middle ear effusion), bilateral  methylPREDNISolone (MEDROL DOSEPAK) 4 MG Tablet Therapy Pack   2. Impacted cerumen, left ear       There is low concern for bacterial infection at this time.  Medrol Dosepak sent to pharmacy for relief of inflammation of inner ear.  Patient to hold off on this medication until tomorrow morning and instructed not to use his symptoms have resolved.  He verbalizes agreement and understanding to plan of care.  He was encouraged to use Debrox as needed for further relief of ear effusion as well as daily over-the-counter antihistamine.  Follow-up for worsening symptoms including pain, nausea, dizziness and further decrease in hearing.

## 2022-07-25 ENCOUNTER — TELEPHONE (OUTPATIENT)
Dept: URGENT CARE | Facility: PHYSICIAN GROUP | Age: 62
End: 2022-07-25

## 2022-07-26 DIAGNOSIS — E03.9 HYPOTHYROIDISM, UNSPECIFIED TYPE: ICD-10-CM

## 2022-07-26 RX ORDER — LEVOTHYROXINE SODIUM 150 MCG
TABLET ORAL
Qty: 90 TABLET | Refills: 0 | Status: SHIPPED | OUTPATIENT
Start: 2022-07-26 | End: 2022-10-24 | Stop reason: SDUPTHER

## 2022-07-26 NOTE — TELEPHONE ENCOUNTER
VOICEMAIL  1. Caller Name: Estrada Toussaint                        Call Back Number: [phone] (home) [phone] (work)      2. Message: patient is out of synthroid and he is going to be going out of town and has not gotten mail order Rx. Please advise if its okay to send out a few to last him for his trip.     3. Patient approves office to leave a detailed voicemail/MyChart message: no

## 2022-10-07 ENCOUNTER — HOSPITAL ENCOUNTER (OUTPATIENT)
Dept: LAB | Facility: MEDICAL CENTER | Age: 62
End: 2022-10-07
Attending: INTERNAL MEDICINE
Payer: COMMERCIAL

## 2022-10-07 DIAGNOSIS — Z00.00 WELL ADULT EXAM: ICD-10-CM

## 2022-10-07 DIAGNOSIS — E03.9 ACQUIRED HYPOTHYROIDISM: ICD-10-CM

## 2022-10-07 LAB
ALT SERPL-CCNC: 15 U/L (ref 2–50)
ANION GAP SERPL CALC-SCNC: 7 MMOL/L (ref 7–16)
APPEARANCE UR: CLEAR
BILIRUB UR QL STRIP.AUTO: NEGATIVE
BUN SERPL-MCNC: 12 MG/DL (ref 8–22)
CALCIUM SERPL-MCNC: 9.3 MG/DL (ref 8.5–10.5)
CHLORIDE SERPL-SCNC: 102 MMOL/L (ref 96–112)
CHOLEST SERPL-MCNC: 184 MG/DL (ref 100–199)
CO2 SERPL-SCNC: 29 MMOL/L (ref 20–33)
COLOR UR: YELLOW
CREAT SERPL-MCNC: 0.98 MG/DL (ref 0.5–1.4)
ERYTHROCYTE [DISTWIDTH] IN BLOOD BY AUTOMATED COUNT: 41.9 FL (ref 35.9–50)
EST. AVERAGE GLUCOSE BLD GHB EST-MCNC: 100 MG/DL
FASTING STATUS PATIENT QL REPORTED: NORMAL
GFR SERPLBLD CREATININE-BSD FMLA CKD-EPI: 87 ML/MIN/1.73 M 2
GLUCOSE SERPL-MCNC: 94 MG/DL (ref 65–99)
GLUCOSE UR STRIP.AUTO-MCNC: NEGATIVE MG/DL
HBA1C MFR BLD: 5.1 % (ref 4–5.6)
HCT VFR BLD AUTO: 47.6 % (ref 42–52)
HDLC SERPL-MCNC: 46 MG/DL
HGB BLD-MCNC: 16.3 G/DL (ref 14–18)
KETONES UR STRIP.AUTO-MCNC: NEGATIVE MG/DL
LDLC SERPL CALC-MCNC: 121 MG/DL
LEUKOCYTE ESTERASE UR QL STRIP.AUTO: NEGATIVE
MCH RBC QN AUTO: 30 PG (ref 27–33)
MCHC RBC AUTO-ENTMCNC: 34.2 G/DL (ref 33.7–35.3)
MCV RBC AUTO: 87.7 FL (ref 81.4–97.8)
MICRO URNS: NORMAL
NITRITE UR QL STRIP.AUTO: NEGATIVE
PH UR STRIP.AUTO: 7.5 [PH] (ref 5–8)
PLATELET # BLD AUTO: 188 K/UL (ref 164–446)
PMV BLD AUTO: 9.7 FL (ref 9–12.9)
POTASSIUM SERPL-SCNC: 4.3 MMOL/L (ref 3.6–5.5)
PROT UR QL STRIP: NEGATIVE MG/DL
PSA SERPL-MCNC: 2.51 NG/ML (ref 0–4)
RBC # BLD AUTO: 5.43 M/UL (ref 4.7–6.1)
RBC UR QL AUTO: NEGATIVE
SODIUM SERPL-SCNC: 138 MMOL/L (ref 135–145)
SP GR UR STRIP.AUTO: 1.01
TRIGL SERPL-MCNC: 84 MG/DL (ref 0–149)
TSH SERPL DL<=0.005 MIU/L-ACNC: 2.63 UIU/ML (ref 0.38–5.33)
UROBILINOGEN UR STRIP.AUTO-MCNC: 0.2 MG/DL
WBC # BLD AUTO: 6.5 K/UL (ref 4.8–10.8)

## 2022-10-07 PROCEDURE — 84153 ASSAY OF PSA TOTAL: CPT

## 2022-10-07 PROCEDURE — 84443 ASSAY THYROID STIM HORMONE: CPT

## 2022-10-07 PROCEDURE — 83036 HEMOGLOBIN GLYCOSYLATED A1C: CPT

## 2022-10-07 PROCEDURE — 36415 COLL VENOUS BLD VENIPUNCTURE: CPT

## 2022-10-07 PROCEDURE — 80048 BASIC METABOLIC PNL TOTAL CA: CPT

## 2022-10-07 PROCEDURE — 80061 LIPID PANEL: CPT

## 2022-10-07 PROCEDURE — 85027 COMPLETE CBC AUTOMATED: CPT

## 2022-10-07 PROCEDURE — 87086 URINE CULTURE/COLONY COUNT: CPT

## 2022-10-07 PROCEDURE — 81003 URINALYSIS AUTO W/O SCOPE: CPT

## 2022-10-07 PROCEDURE — 84460 ALANINE AMINO (ALT) (SGPT): CPT

## 2022-10-09 LAB
BACTERIA UR CULT: NORMAL
SIGNIFICANT IND 70042: NORMAL
SITE SITE: NORMAL
SOURCE SOURCE: NORMAL

## 2022-10-24 DIAGNOSIS — E03.9 HYPOTHYROIDISM, UNSPECIFIED TYPE: ICD-10-CM

## 2022-10-24 RX ORDER — LEVOTHYROXINE SODIUM 150 MCG
TABLET ORAL
Qty: 90 TABLET | Refills: 0 | Status: SHIPPED | OUTPATIENT
Start: 2022-10-24 | End: 2023-01-11

## 2023-01-11 ENCOUNTER — OFFICE VISIT (OUTPATIENT)
Dept: MEDICAL GROUP | Facility: PHYSICIAN GROUP | Age: 63
End: 2023-01-11
Payer: COMMERCIAL

## 2023-01-11 VITALS
BODY MASS INDEX: 21.6 KG/M2 | SYSTOLIC BLOOD PRESSURE: 114 MMHG | DIASTOLIC BLOOD PRESSURE: 68 MMHG | WEIGHT: 163 LBS | OXYGEN SATURATION: 94 % | HEIGHT: 73 IN | TEMPERATURE: 97 F | RESPIRATION RATE: 16 BRPM | HEART RATE: 65 BPM

## 2023-01-11 DIAGNOSIS — H93.13 TINNITUS OF BOTH EARS: ICD-10-CM

## 2023-01-11 DIAGNOSIS — E03.9 HYPOTHYROIDISM, UNSPECIFIED TYPE: ICD-10-CM

## 2023-01-11 DIAGNOSIS — Z87.19 HISTORY OF ULCERATIVE COLITIS: Chronic | ICD-10-CM

## 2023-01-11 DIAGNOSIS — Z12.11 COLON CANCER SCREENING: ICD-10-CM

## 2023-01-11 DIAGNOSIS — E78.5 DYSLIPIDEMIA: Chronic | ICD-10-CM

## 2023-01-11 PROBLEM — K90.41 GLUTEN-SENSITIVE ENTEROPATHY: Status: ACTIVE | Noted: 2023-01-11

## 2023-01-11 PROCEDURE — 99214 OFFICE O/P EST MOD 30 MIN: CPT | Performed by: INTERNAL MEDICINE

## 2023-01-11 RX ORDER — LEVOTHYROXINE SODIUM 0.15 MG/1
150 TABLET ORAL
Qty: 90 TABLET | Refills: 3 | Status: SHIPPED | OUTPATIENT
Start: 2023-01-11 | End: 2023-11-09

## 2023-01-11 ASSESSMENT — PATIENT HEALTH QUESTIONNAIRE - PHQ9: CLINICAL INTERPRETATION OF PHQ2 SCORE: 0

## 2023-01-11 NOTE — ASSESSMENT & PLAN NOTE
Chronic condition.  The patient stated that we will on treatment including Asacol previously.  Patient has not had colonoscopy done for several years.  Patient denies fever chills GI bleeding or significant change in his bowel movement.  Patient denies unexplained weight loss.

## 2023-01-11 NOTE — PROGRESS NOTES
"PRIMARY CARE CLINIC VISIT    Chief complaint:    Follow-up hypothyroidism  Tinnitus  Prescription refills  Follow-up hyperlipidemia      History of Present Illness     Dyslipidemia  Chronic condition.  The patient presently on diet therapy.  Patient is due for lab test which has been ordered.    History of ulcerative colitis  Chronic condition.  The patient stated that we will on treatment including Asacol previously.  Patient has not had colonoscopy done for several years.  Patient denies fever chills GI bleeding or significant change in his bowel movement.  Patient denies unexplained weight loss.    Hypothyroid  Chronic condition.  The patient requests refill for levothyroxine.  No significant side effects reported.  Lab tests requested.    Tinnitus of both ears  This is a new condition noted since last several months.  Patient denies any hearing loss.  Patient denies any fever or chills headache change in vision.    Current Outpatient Medications on File Prior to Visit   Medication Sig Dispense Refill    fluticasone (FLONASE) 50 MCG/ACT nasal spray Spray 2 Sprays in nose every day. Each Nostril 3 Bottle 3    acetaminophen (TYLENOL) 500 MG Tab Take 500-1,000 mg by mouth every 6 hours as needed.      ibuprofen (MOTRIN) 200 MG Tab Take 200 mg by mouth every 6 hours as needed.       No current facility-administered medications on file prior to visit.        Allergies: Patient has no known allergies.    ROS  As per HPI above. All other systems reviewed and negative.      Past Medical, Social, and Family history reviewed and updated in EPIC     Objective     /68 (BP Location: Left arm, Patient Position: Sitting, BP Cuff Size: Adult)   Pulse 65   Temp 36.1 °C (97 °F) (Temporal)   Resp 16   Ht 1.854 m (6' 1\")   Wt 73.9 kg (163 lb)   SpO2 94%    Body mass index is 21.51 kg/m².    General: alert in no apparent distress.  Cardiovascular: regular rate and rhythm  Pulmonary: lungs : no wheezing   Gastrointestinal: " BS present. No obvious mass noted  Ears external canals are clear tympanic membrane's are intact no acute abnormality noted  Neuro nonfocal      Lab Results   Component Value Date/Time    HBA1C 5.1 10/07/2022 08:06 AM    HBA1C 5.2 05/17/2021 07:18 AM       Lab Results   Component Value Date/Time    WBC 6.5 10/07/2022 08:06 AM    HEMOGLOBIN 16.3 10/07/2022 08:06 AM    HEMATOCRIT 47.6 10/07/2022 08:06 AM    MCV 87.7 10/07/2022 08:06 AM    PLATELETCT 188 10/07/2022 08:06 AM         Lab Results   Component Value Date/Time    SODIUM 138 10/07/2022 08:06 AM    POTASSIUM 4.3 10/07/2022 08:06 AM    GLUCOSE 94 10/07/2022 08:06 AM    BUN 12 10/07/2022 08:06 AM    CREATININE 0.98 10/07/2022 08:06 AM       Lab Results   Component Value Date/Time    CHOLSTRLTOT 184 10/07/2022 08:06 AM     (H) 10/07/2022 08:06 AM    HDL 46 10/07/2022 08:06 AM    TRIGLYCERIDE 84 10/07/2022 08:06 AM       Lab Results   Component Value Date/Time    ALTSGPT 15 10/07/2022 08:06 AM             Assessment and Plan     1. Hypothyroidism, unspecified type  - TSH; Future  - Basic Metabolic Panel; Future  Chronic stable condition.  Recent lab test result discussed with the patient.  Recommend patient continue with levothyroxine 150 MCG daily.  Rx sent to the pharmacy.      2. Dyslipidemia  - Lipid Profile; Future  Chronic condition.   Recent lab test result discussed with the patient.  Patient not interested in taking a medication at this time.  Recommend diet and exercise    3. History of ulcerative colitis  Presently the patient is not on any therapy.  Recommend follow-up colonoscopy  to be done.  Referral submitted    4. Tinnitus of both ears  This is a new condition.  Ear examination today unremarkable.  Cause unclear needing further investigation.  - Referral to ENT  Recommend follow-up after ENT visit      Other orders  - levothyroxine (SYNTHROID) 150 MCG Tab; Take 1 Tablet by mouth every morning on an empty stomach.  Dispense: 90 Tablet;  Refill: 3      Total time:  32   min -  That includes time for chart review before the visit, the actual patient visit, and time spent on documentation in EMR after the visit.  Chart review/prep, review of other providers' records, imaging/lab review, face-to-face time for history/examination, ordering, prescribing,  review of results/meds/ treatment plan with patient, and care coordination.                 Please note that this dictation was created using voice recognition software. I have made every reasonable attempt to correct obvious errors, but I expect that there are errors of grammar and possibly content that I did not discover before finalizing the note.    Zhou Cardenas MD  Internal Medicine  [place] [facility]

## 2023-01-11 NOTE — ASSESSMENT & PLAN NOTE
This is a new condition noted since last several months.  Patient denies any hearing loss.  Patient denies any fever or chills headache change in vision.

## 2023-01-11 NOTE — ASSESSMENT & PLAN NOTE
Chronic condition.  The patient presently on diet therapy.  Patient is due for lab test which has been ordered.

## 2023-01-11 NOTE — ASSESSMENT & PLAN NOTE
Chronic condition.  The patient requests refill for levothyroxine.  No significant side effects reported.  Lab tests requested.

## 2023-05-09 ENCOUNTER — HOSPITAL ENCOUNTER (OUTPATIENT)
Dept: LAB | Facility: MEDICAL CENTER | Age: 63
End: 2023-05-09
Attending: INTERNAL MEDICINE
Payer: COMMERCIAL

## 2023-05-09 DIAGNOSIS — E78.5 DYSLIPIDEMIA: Chronic | ICD-10-CM

## 2023-05-09 DIAGNOSIS — E03.9 HYPOTHYROIDISM, UNSPECIFIED TYPE: ICD-10-CM

## 2023-05-09 LAB
ANION GAP SERPL CALC-SCNC: 9 MMOL/L (ref 7–16)
BUN SERPL-MCNC: 12 MG/DL (ref 8–22)
CALCIUM SERPL-MCNC: 8.9 MG/DL (ref 8.5–10.5)
CHLORIDE SERPL-SCNC: 103 MMOL/L (ref 96–112)
CHOLEST SERPL-MCNC: 170 MG/DL (ref 100–199)
CO2 SERPL-SCNC: 28 MMOL/L (ref 20–33)
CREAT SERPL-MCNC: 0.96 MG/DL (ref 0.5–1.4)
FASTING STATUS PATIENT QL REPORTED: NORMAL
GFR SERPLBLD CREATININE-BSD FMLA CKD-EPI: 89 ML/MIN/1.73 M 2
GLUCOSE SERPL-MCNC: 102 MG/DL (ref 65–99)
HDLC SERPL-MCNC: 45 MG/DL
LDLC SERPL CALC-MCNC: 106 MG/DL
POTASSIUM SERPL-SCNC: 5 MMOL/L (ref 3.6–5.5)
SODIUM SERPL-SCNC: 140 MMOL/L (ref 135–145)
TRIGL SERPL-MCNC: 95 MG/DL (ref 0–149)
TSH SERPL DL<=0.005 MIU/L-ACNC: 4.98 UIU/ML (ref 0.38–5.33)

## 2023-05-09 PROCEDURE — 80048 BASIC METABOLIC PNL TOTAL CA: CPT

## 2023-05-09 PROCEDURE — 80061 LIPID PANEL: CPT

## 2023-05-09 PROCEDURE — 84443 ASSAY THYROID STIM HORMONE: CPT

## 2023-05-09 PROCEDURE — 36415 COLL VENOUS BLD VENIPUNCTURE: CPT

## 2023-05-10 ENCOUNTER — OFFICE VISIT (OUTPATIENT)
Dept: MEDICAL GROUP | Facility: PHYSICIAN GROUP | Age: 63
End: 2023-05-10
Payer: COMMERCIAL

## 2023-05-10 VITALS
RESPIRATION RATE: 16 BRPM | TEMPERATURE: 97.3 F | BODY MASS INDEX: 21.27 KG/M2 | SYSTOLIC BLOOD PRESSURE: 108 MMHG | HEIGHT: 73 IN | DIASTOLIC BLOOD PRESSURE: 60 MMHG | WEIGHT: 160.5 LBS | HEART RATE: 62 BPM | OXYGEN SATURATION: 98 %

## 2023-05-10 DIAGNOSIS — E03.9 ACQUIRED HYPOTHYROIDISM: Chronic | ICD-10-CM

## 2023-05-10 DIAGNOSIS — Z23 NEED FOR VACCINATION: ICD-10-CM

## 2023-05-10 DIAGNOSIS — Z87.19 HISTORY OF ULCERATIVE COLITIS: Chronic | ICD-10-CM

## 2023-05-10 DIAGNOSIS — R73.03 PREDIABETES: ICD-10-CM

## 2023-05-10 DIAGNOSIS — Z12.5 SCREENING FOR MALIGNANT NEOPLASM OF PROSTATE: ICD-10-CM

## 2023-05-10 DIAGNOSIS — E78.5 DYSLIPIDEMIA: Chronic | ICD-10-CM

## 2023-05-10 PROCEDURE — 90471 IMMUNIZATION ADMIN: CPT | Performed by: INTERNAL MEDICINE

## 2023-05-10 PROCEDURE — 99214 OFFICE O/P EST MOD 30 MIN: CPT | Mod: 25 | Performed by: INTERNAL MEDICINE

## 2023-05-10 PROCEDURE — 90715 TDAP VACCINE 7 YRS/> IM: CPT | Performed by: INTERNAL MEDICINE

## 2023-05-10 NOTE — PROGRESS NOTES
PRIMARY CARE CLINIC VISIT    Chief complaint:    Follow-up hypothyroid  Prediabetes  Hyperlipidemia  Ulcerative colitis  Tdap vaccine    History of Present Illness     History of ulcerative colitis  Chronic condition.  Patient was referred previously to GI however the patient never made the appointment.  Today I have reminded the patient is important to follow-up with GI specialist.  Patient denies nausea vomiting abdominal pain GI bleeding or any change in bowel movement.    Hypothyroid  Chronic condition for the patient is taking levothyroxine 150 mcg daily.  Recent lab test discussed with the patient.  No significant side effect noted.    Dyslipidemia  Chronic condition.  Noted with recent lab test.  Discussed with the patient.      Prediabetes  This is a new condition noted w recent lab test.  Results discussed with the patient.    Current Outpatient Medications on File Prior to Visit   Medication Sig Dispense Refill    levothyroxine (SYNTHROID) 150 MCG Tab Take 1 Tablet by mouth every morning on an empty stomach. 90 Tablet 3    fluticasone (FLONASE) 50 MCG/ACT nasal spray Spray 2 Sprays in nose every day. Each Nostril 3 Bottle 3    acetaminophen (TYLENOL) 500 MG Tab Take 500-1,000 mg by mouth every 6 hours as needed.      ibuprofen (MOTRIN) 200 MG Tab Take 200 mg by mouth every 6 hours as needed.       No current facility-administered medications on file prior to visit.        Allergies: Patient has no known allergies.    Current Outpatient Medications Ordered in Epic   Medication Sig Dispense Refill    levothyroxine (SYNTHROID) 150 MCG Tab Take 1 Tablet by mouth every morning on an empty stomach. 90 Tablet 3    fluticasone (FLONASE) 50 MCG/ACT nasal spray Spray 2 Sprays in nose every day. Each Nostril 3 Bottle 3    acetaminophen (TYLENOL) 500 MG Tab Take 500-1,000 mg by mouth every 6 hours as needed.      ibuprofen (MOTRIN) 200 MG Tab Take 200 mg by mouth every 6 hours as needed.       No current Epic-ordered  "facility-administered medications on file.       Past Medical History:   Diagnosis Date    Hypothyroid 2013    IBD (inflammatory bowel disease)     Midline low back pain without sciatica 10/26/2020    Vitiligo 2013       Past Surgical History:   Procedure Laterality Date    KNEE RECONSTRUCTION      Right knee   [place]  ACL replacement        Family History   Problem Relation Age of Onset    Cancer Mother         Breast Cancer--metastatic    Heart Disease Father     Stroke Father     Stroke Brother     Stroke Sister         aneursym       Social History     Tobacco Use   Smoking Status Former    Packs/day: 0.10    Years: 10.00    Pack years: 1.00    Types: Cigarettes    Quit date: 1986    Years since quittin.3   Smokeless Tobacco Never   Vaping Use    Vaping Use: Never used       Social History     Substance and Sexual Activity   Alcohol Use Yes    Comment: 0-1 monthly       Review of systems.  As per HPI above. All other systems reviewed and negative.      Past Medical, Social, and Family history reviewed and updated in EPIC     Objective     /60 (BP Location: Left arm, Patient Position: Sitting, BP Cuff Size: Adult)   Pulse 62   Temp 36.3 °C (97.3 °F) (Temporal)   Resp 16   Ht 1.854 m (6' 1\")   Wt 72.8 kg (160 lb 8 oz)   SpO2 98%    Body mass index is 21.18 kg/m².    General: alert in no apparent distress.  Cardiovascular: regular rate and rhythm  Pulmonary: lungs : no wheezing   Gastrointestinal: BS present. No obvious mass noted  Cranial nerves II to XII grossly intact      Lab Results   Component Value Date/Time    HBA1C 5.1 10/07/2022 08:06 AM    HBA1C 5.2 2021 07:18 AM       Lab Results   Component Value Date/Time    WBC 6.5 10/07/2022 08:06 AM    HEMOGLOBIN 16.3 10/07/2022 08:06 AM    HEMATOCRIT 47.6 10/07/2022 08:06 AM    MCV 87.7 10/07/2022 08:06 AM    PLATELETCT 188 10/07/2022 08:06 AM         Lab Results   Component Value Date/Time    SODIUM 140 2023 " 08:00 AM    POTASSIUM 5.0 05/09/2023 08:00 AM    GLUCOSE 102 (H) 05/09/2023 08:00 AM    BUN 12 05/09/2023 08:00 AM    CREATININE 0.96 05/09/2023 08:00 AM       Lab Results   Component Value Date/Time    CHOLSTRLTOT 170 05/09/2023 08:00 AM    TRIGLYCERIDE 95 05/09/2023 08:00 AM    HDL 45 05/09/2023 08:00 AM     (H) 05/09/2023 08:00 AM       Lab Results   Component Value Date/Time    ALTSGPT 15 10/07/2022 08:06 AM             Assessment and Plan     1. Need for vaccination  - Tdap Vaccine =>6YO IM    2. History of ulcerative colitis  Chronic condition.  The patient currently asymptomatic.  Reminded the patient to follow-up with GI specialist.  Contact information of the specialist given to the patient.    3. Acquired hypothyroidism  - TSH; Future  Chronic stable condition.  Continue with levothyroxine 150 mcg daily.    4. Dyslipidemia  - ALANINE AMINO-TRANS; Future  - Lipid Profile; Future  Chronic condition.  Advised the patient healthy diet and exercise.  Continue to monitor.    5. Prediabetes  - HEMOGLOBIN A1C; Future  This is a new condition.  Recommend healthy diet and exercise.  Repeat lab test in 6 months.    6. Screening for malignant neoplasm of prostate  - PROSTATE SPECIFIC AG SCREENING; Future                              Please note that this dictation was created using voice recognition software. I have made every reasonable attempt to correct obvious errors, but I expect that there are errors of grammar and possibly content that I did not discover before finalizing the note.    Zhou Cardenas MD  Internal Medicine  [facility]

## 2023-05-10 NOTE — ASSESSMENT & PLAN NOTE
Chronic condition.  Patient was referred previously to GI however the patient never made the appointment.  Today I have reminded the patient is important to follow-up with GI specialist.  Patient denies nausea vomiting abdominal pain GI bleeding or any change in bowel movement.

## 2023-05-10 NOTE — ASSESSMENT & PLAN NOTE
Chronic condition for the patient is taking levothyroxine 150 mcg daily.  Recent lab test discussed with the patient.  No significant side effect noted.

## 2023-11-09 ENCOUNTER — OFFICE VISIT (OUTPATIENT)
Dept: MEDICAL GROUP | Facility: PHYSICIAN GROUP | Age: 63
End: 2023-11-09
Payer: COMMERCIAL

## 2023-11-09 VITALS
WEIGHT: 160 LBS | RESPIRATION RATE: 16 BRPM | HEART RATE: 64 BPM | TEMPERATURE: 97.7 F | OXYGEN SATURATION: 98 % | SYSTOLIC BLOOD PRESSURE: 112 MMHG | HEIGHT: 73 IN | BODY MASS INDEX: 21.2 KG/M2 | DIASTOLIC BLOOD PRESSURE: 74 MMHG

## 2023-11-09 DIAGNOSIS — E03.9 ACQUIRED HYPOTHYROIDISM: Chronic | ICD-10-CM

## 2023-11-09 DIAGNOSIS — K51.90 ULCERATIVE COLITIS WITHOUT COMPLICATIONS, UNSPECIFIED LOCATION (HCC): ICD-10-CM

## 2023-11-09 DIAGNOSIS — Z23 NEED FOR VACCINATION: ICD-10-CM

## 2023-11-09 DIAGNOSIS — N40.2 PROSTATE NODULE: ICD-10-CM

## 2023-11-09 DIAGNOSIS — R73.03 PREDIABETES: Chronic | ICD-10-CM

## 2023-11-09 DIAGNOSIS — E78.5 DYSLIPIDEMIA: Chronic | ICD-10-CM

## 2023-11-09 DIAGNOSIS — Z12.5 SCREENING FOR MALIGNANT NEOPLASM OF PROSTATE: ICD-10-CM

## 2023-11-09 PROCEDURE — 99214 OFFICE O/P EST MOD 30 MIN: CPT | Performed by: INTERNAL MEDICINE

## 2023-11-09 PROCEDURE — 3078F DIAST BP <80 MM HG: CPT | Performed by: INTERNAL MEDICINE

## 2023-11-09 PROCEDURE — 3074F SYST BP LT 130 MM HG: CPT | Performed by: INTERNAL MEDICINE

## 2023-11-09 RX ORDER — LEVOTHYROXINE SODIUM 0.15 MG/1
150 TABLET ORAL
Qty: 90 TABLET | Refills: 3 | Status: SHIPPED | OUTPATIENT
Start: 2023-11-09

## 2023-11-09 NOTE — ASSESSMENT & PLAN NOTE
Chronic condition.  The patient presently taking levothyroxine.  The patient is due for lab test.  Patient tolerating medication well.

## 2023-11-09 NOTE — ASSESSMENT & PLAN NOTE
Chronic condition.  The patient reported that he was diagnosed with ulcerative years ago.  The patient is due to follow-up with GI service.  The patient denies fever chills abdominal pain GI bleeding or unexplained weight loss.

## 2023-11-09 NOTE — PROGRESS NOTES
PRIMARY CARE CLINIC VISIT        Chief Complaint   Patient presents with    Follow-Up   Follow-up ulcerative colitis  Hypothyroidism   hyperlipidemia  Prediabetes  Request lab test  Vaccination status            History of Present Illness     Need for vaccination  Pt declined influenza    Ulcerative colitis (HCC)  Chronic condition.  The patient reported that he was diagnosed with ulcerative years ago.  The patient is due to follow-up with GI service.  The patient denies fever chills abdominal pain GI bleeding or unexplained weight loss.    Hypothyroid  Chronic condition.  The patient presently taking levothyroxine.  The patient is due for lab test.  Patient tolerating medication well.    Dyslipidemia  Chronic condition.  The patient currently on diet therapy.  Blood test ordered for follow-up    Prediabetes  Chronic condition.  The patient is presently on diet therapy.  Patient is due for blood test.    Current Outpatient Medications on File Prior to Visit   Medication Sig Dispense Refill    acetaminophen (TYLENOL) 500 MG Tab Take 500-1,000 mg by mouth every 6 hours as needed.      ibuprofen (MOTRIN) 200 MG Tab Take 200 mg by mouth every 6 hours as needed.       No current facility-administered medications on file prior to visit.        Allergies: Patient has no known allergies.    Current Outpatient Medications Ordered in Epic   Medication Sig Dispense Refill    levothyroxine (SYNTHROID) 150 MCG Tab Take 1 Tablet by mouth every morning on an empty stomach. 90 Tablet 3    acetaminophen (TYLENOL) 500 MG Tab Take 500-1,000 mg by mouth every 6 hours as needed.      ibuprofen (MOTRIN) 200 MG Tab Take 200 mg by mouth every 6 hours as needed.       No current Epic-ordered facility-administered medications on file.       Past Medical History:   Diagnosis Date    Hypothyroid 5/16/2013    IBD (inflammatory bowel disease)     Midline low back pain without sciatica 10/26/2020    Vitiligo 05/16/2013       Past Surgical  "History:   Procedure Laterality Date    KNEE RECONSTRUCTION      Right knee   [place]  ACL replacement        Family History   Problem Relation Age of Onset    Cancer Mother         Breast Cancer--metastatic    Heart Disease Father     Stroke Father     Stroke Brother     Stroke Sister         aneursym       Social History     Tobacco Use   Smoking Status Former    Current packs/day: 0.00    Average packs/day: 0.1 packs/day for 10.0 years (1.0 ttl pk-yrs)    Types: Cigarettes    Start date: 1976    Quit date: 1986    Years since quittin.8   Smokeless Tobacco Never       Social History     Substance and Sexual Activity   Alcohol Use Yes    Comment: 0-1 monthly       Review of systems.  As per HPI above. All other systems reviewed and negative.      Past Medical, Social, and Family history reviewed and updated in EPIC     Objective     /74 (BP Location: Right arm, Patient Position: Sitting, BP Cuff Size: Adult)   Pulse 64   Temp 36.5 °C (97.7 °F) (Temporal)   Resp 16   Ht 1.854 m (6' 1\")   Wt 72.6 kg (160 lb)   SpO2 98%    Body mass index is 21.11 kg/m².    General: alert in no apparent distress.  Cardiovascular: regular rate and rhythm  Pulmonary: lungs : no wheezing   Gastrointestinal: BS present.    normal male genitalia.  No urethral discharge.  Prostate  There appeared to be a nodule palpable left prostate region.  Nontender      Lab Results   Component Value Date/Time    HBA1C 5.1 10/07/2022 08:06 AM    HBA1C 5.2 2021 07:18 AM       Lab Results   Component Value Date/Time    WBC 6.5 10/07/2022 08:06 AM    HEMOGLOBIN 16.3 10/07/2022 08:06 AM    HEMATOCRIT 47.6 10/07/2022 08:06 AM    MCV 87.7 10/07/2022 08:06 AM    PLATELETCT 188 10/07/2022 08:06 AM         Lab Results   Component Value Date/Time    SODIUM 140 2023 08:00 AM    POTASSIUM 5.0 2023 08:00 AM    GLUCOSE 102 (H) 2023 08:00 AM    BUN 12 2023 08:00 AM    CREATININE 0.96 2023 08:00 AM "       Lab Results   Component Value Date/Time    CHOLSTRLTOT 170 05/09/2023 08:00 AM    TRIGLYCERIDE 95 05/09/2023 08:00 AM    HDL 45 05/09/2023 08:00 AM     (H) 05/09/2023 08:00 AM       Lab Results   Component Value Date/Time    ALTSGPT 15 10/07/2022 08:06 AM             Assessment and Plan     1. Acquired hypothyroidism  - TSH; Future  Chronic condition.  Current status unclear.  Lab test ordered for follow-up.  Continue Synthroid 150 mcg daily    2. Dyslipidemia  - ALANINE AMINO-TRANS; Future  - Lipid Profile; Future  Chronic condition.  Current status unclear.  Lab test ordered for follow-up    3. Prediabetes  - HEMOGLOBIN A1C; Future  - Basic Metabolic Panel; Future  Chronic condition.  Current status unclear.  Blood test requested follow-up.  Recommend low sweet low-carb diet.    4. Need for vaccination  Patient refused recommended vaccine today.    5. Ulcerative colitis without complications, unspecified location (HCC)  - Referral to Gastroenterology  Chronic condition.  Patient asymptomatic.    6. Prostate nodule.  This is a new finding noted on physical exam today.  Patient asymptomatic.  PSA ordered today.  - Referral to Urology                  Healthcare Maintenance       Health Maintenance Due   Topic Date Due    HIV Screening  Never done    COVID-19 Vaccine (1) Never done    Colorectal Cancer Screening  06/24/2019    Zoster (Shingles) Vaccines (2 of 2) 06/29/2021    Influenza Vaccine (1) Never done               Please note that this dictation was created using voice recognition software. I have made every reasonable attempt to correct obvious errors, but I expect that there are errors of grammar and possibly content that I did not discover before finalizing the note.    Zhou Cardenas MD  Internal Medicine  [facility]

## 2023-12-28 ENCOUNTER — HOSPITAL ENCOUNTER (OUTPATIENT)
Dept: LAB | Facility: MEDICAL CENTER | Age: 63
End: 2023-12-28
Attending: UROLOGY
Payer: COMMERCIAL

## 2023-12-28 LAB
ANION GAP SERPL CALC-SCNC: 9 MMOL/L (ref 7–16)
BUN SERPL-MCNC: 14 MG/DL (ref 8–22)
CALCIUM SERPL-MCNC: 9.2 MG/DL (ref 8.5–10.5)
CHLORIDE SERPL-SCNC: 104 MMOL/L (ref 96–112)
CO2 SERPL-SCNC: 28 MMOL/L (ref 20–33)
CREAT SERPL-MCNC: 1.09 MG/DL (ref 0.5–1.4)
GFR SERPLBLD CREATININE-BSD FMLA CKD-EPI: 76 ML/MIN/1.73 M 2
GLUCOSE SERPL-MCNC: 99 MG/DL (ref 65–99)
POTASSIUM SERPL-SCNC: 5 MMOL/L (ref 3.6–5.5)
SODIUM SERPL-SCNC: 141 MMOL/L (ref 135–145)

## 2023-12-28 PROCEDURE — 36415 COLL VENOUS BLD VENIPUNCTURE: CPT

## 2023-12-28 PROCEDURE — 80048 BASIC METABOLIC PNL TOTAL CA: CPT

## 2023-12-31 ENCOUNTER — HOSPITAL ENCOUNTER (OUTPATIENT)
Dept: RADIOLOGY | Facility: MEDICAL CENTER | Age: 63
End: 2023-12-31
Attending: UROLOGY
Payer: COMMERCIAL

## 2023-12-31 DIAGNOSIS — N40.2 NODULAR PROSTATE WITHOUT URINARY OBSTRUCTION: ICD-10-CM

## 2023-12-31 PROCEDURE — 700117 HCHG RX CONTRAST REV CODE 255: Performed by: UROLOGY

## 2023-12-31 PROCEDURE — 72197 MRI PELVIS W/O & W/DYE: CPT

## 2023-12-31 PROCEDURE — A9579 GAD-BASE MR CONTRAST NOS,1ML: HCPCS | Performed by: UROLOGY

## 2023-12-31 PROCEDURE — 700111 HCHG RX REV CODE 636 W/ 250 OVERRIDE (IP): Mod: JZ | Performed by: RADIOLOGY

## 2023-12-31 RX ADMIN — GADOTERIDOL 15 ML: 279.3 INJECTION, SOLUTION INTRAVENOUS at 09:36

## 2023-12-31 RX ADMIN — GLUCAGON 1 MG: 1 INJECTION, POWDER, LYOPHILIZED, FOR SOLUTION INTRAMUSCULAR; INTRAVENOUS at 08:30

## 2024-01-03 ENCOUNTER — HOSPITAL ENCOUNTER (OUTPATIENT)
Dept: LAB | Facility: MEDICAL CENTER | Age: 64
End: 2024-01-03
Attending: UROLOGY
Payer: COMMERCIAL

## 2024-01-03 PROCEDURE — 84153 ASSAY OF PSA TOTAL: CPT

## 2024-01-03 PROCEDURE — 36415 COLL VENOUS BLD VENIPUNCTURE: CPT

## 2024-01-04 LAB — PSA SERPL-MCNC: 2.6 NG/ML (ref 0–4)

## 2024-08-13 ENCOUNTER — HOSPITAL ENCOUNTER (OUTPATIENT)
Dept: LAB | Facility: MEDICAL CENTER | Age: 64
End: 2024-08-13
Attending: INTERNAL MEDICINE
Payer: COMMERCIAL

## 2024-08-13 ENCOUNTER — HOSPITAL ENCOUNTER (OUTPATIENT)
Dept: LAB | Facility: MEDICAL CENTER | Age: 64
End: 2024-08-13
Attending: UROLOGY
Payer: COMMERCIAL

## 2024-08-13 DIAGNOSIS — Z12.5 SCREENING FOR MALIGNANT NEOPLASM OF PROSTATE: ICD-10-CM

## 2024-08-13 DIAGNOSIS — R73.03 PREDIABETES: Chronic | ICD-10-CM

## 2024-08-13 DIAGNOSIS — E03.9 ACQUIRED HYPOTHYROIDISM: Chronic | ICD-10-CM

## 2024-08-13 DIAGNOSIS — E78.5 DYSLIPIDEMIA: Chronic | ICD-10-CM

## 2024-08-13 LAB
ALT SERPL-CCNC: 12 U/L (ref 2–50)
ANION GAP SERPL CALC-SCNC: 9 MMOL/L (ref 7–16)
BUN SERPL-MCNC: 12 MG/DL (ref 8–22)
CALCIUM SERPL-MCNC: 8.7 MG/DL (ref 8.5–10.5)
CHLORIDE SERPL-SCNC: 103 MMOL/L (ref 96–112)
CHOLEST SERPL-MCNC: 173 MG/DL (ref 100–199)
CO2 SERPL-SCNC: 28 MMOL/L (ref 20–33)
CREAT SERPL-MCNC: 1.06 MG/DL (ref 0.5–1.4)
EST. AVERAGE GLUCOSE BLD GHB EST-MCNC: 100 MG/DL
GFR SERPLBLD CREATININE-BSD FMLA CKD-EPI: 79 ML/MIN/1.73 M 2
GLUCOSE SERPL-MCNC: 98 MG/DL (ref 65–99)
HBA1C MFR BLD: 5.1 % (ref 4–5.6)
HDLC SERPL-MCNC: 41 MG/DL
LDLC SERPL CALC-MCNC: 109 MG/DL
POTASSIUM SERPL-SCNC: 4.1 MMOL/L (ref 3.6–5.5)
PSA SERPL-MCNC: 2.46 NG/ML (ref 0–4)
PSA SERPL-MCNC: 2.47 NG/ML (ref 0–4)
SODIUM SERPL-SCNC: 140 MMOL/L (ref 135–145)
TRIGL SERPL-MCNC: 115 MG/DL (ref 0–149)
TSH SERPL-ACNC: 4.92 UIU/ML (ref 0.35–5.5)

## 2024-08-13 PROCEDURE — 84443 ASSAY THYROID STIM HORMONE: CPT

## 2024-08-13 PROCEDURE — 84460 ALANINE AMINO (ALT) (SGPT): CPT

## 2024-08-13 PROCEDURE — 36415 COLL VENOUS BLD VENIPUNCTURE: CPT

## 2024-08-13 PROCEDURE — 80061 LIPID PANEL: CPT

## 2024-08-13 PROCEDURE — 84153 ASSAY OF PSA TOTAL: CPT | Mod: 91

## 2024-08-13 PROCEDURE — 84153 ASSAY OF PSA TOTAL: CPT

## 2024-08-13 PROCEDURE — 83036 HEMOGLOBIN GLYCOSYLATED A1C: CPT

## 2024-08-13 PROCEDURE — 80048 BASIC METABOLIC PNL TOTAL CA: CPT

## 2024-08-22 PROBLEM — C61 PROSTATE CANCER (HCC): Chronic | Status: ACTIVE | Noted: 2024-08-22

## 2024-08-22 PROBLEM — C61 PROSTATE CANCER (HCC): Status: ACTIVE | Noted: 2024-08-22

## 2024-08-27 ENCOUNTER — OFFICE VISIT (OUTPATIENT)
Dept: MEDICAL GROUP | Facility: PHYSICIAN GROUP | Age: 64
End: 2024-08-27
Payer: COMMERCIAL

## 2024-08-27 ENCOUNTER — HOSPITAL ENCOUNTER (OUTPATIENT)
Dept: RADIOLOGY | Facility: MEDICAL CENTER | Age: 64
End: 2024-08-27
Attending: INTERNAL MEDICINE
Payer: COMMERCIAL

## 2024-08-27 VITALS
BODY MASS INDEX: 21.18 KG/M2 | OXYGEN SATURATION: 99 % | HEART RATE: 64 BPM | WEIGHT: 159.8 LBS | TEMPERATURE: 97.8 F | SYSTOLIC BLOOD PRESSURE: 120 MMHG | DIASTOLIC BLOOD PRESSURE: 84 MMHG | HEIGHT: 73 IN

## 2024-08-27 DIAGNOSIS — K51.40 PSEUDOPOLYPOSIS OF COLON, UNSPECIFIED COMPLICATION STATUS, UNSPECIFIED PART OF COLON (HCC): ICD-10-CM

## 2024-08-27 DIAGNOSIS — M25.511 CHRONIC RIGHT SHOULDER PAIN: ICD-10-CM

## 2024-08-27 DIAGNOSIS — G89.29 CHRONIC RIGHT SHOULDER PAIN: ICD-10-CM

## 2024-08-27 DIAGNOSIS — K51.919 ULCERATIVE COLITIS WITH COMPLICATION, UNSPECIFIED LOCATION (HCC): ICD-10-CM

## 2024-08-27 DIAGNOSIS — L80 VITILIGO: ICD-10-CM

## 2024-08-27 DIAGNOSIS — K59.09 CHRONIC CONSTIPATION: ICD-10-CM

## 2024-08-27 DIAGNOSIS — R73.03 PREDIABETES: Chronic | ICD-10-CM

## 2024-08-27 DIAGNOSIS — E03.9 ACQUIRED HYPOTHYROIDISM: Chronic | ICD-10-CM

## 2024-08-27 DIAGNOSIS — C61 PROSTATE CANCER (HCC): Chronic | ICD-10-CM

## 2024-08-27 DIAGNOSIS — R10.31 RIGHT GROIN PAIN: ICD-10-CM

## 2024-08-27 DIAGNOSIS — E78.5 DYSLIPIDEMIA: Chronic | ICD-10-CM

## 2024-08-27 PROBLEM — K63.5 COLON POLYP: Status: ACTIVE | Noted: 2024-08-27

## 2024-08-27 PROBLEM — K63.5 COLON POLYP: Chronic | Status: ACTIVE | Noted: 2024-08-27

## 2024-08-27 PROBLEM — N40.2 PROSTATE NODULE: Status: RESOLVED | Noted: 2023-11-09 | Resolved: 2024-08-27

## 2024-08-27 PROCEDURE — 73030 X-RAY EXAM OF SHOULDER: CPT | Mod: RT

## 2024-08-27 RX ORDER — LEVOTHYROXINE SODIUM 150 UG/1
150 TABLET ORAL
Qty: 90 TABLET | Refills: 3 | Status: SHIPPED | OUTPATIENT
Start: 2024-08-27

## 2024-08-27 ASSESSMENT — PATIENT HEALTH QUESTIONNAIRE - PHQ9: CLINICAL INTERPRETATION OF PHQ2 SCORE: 0

## 2024-08-27 NOTE — PROGRESS NOTES
PRIMARY CARE CLINIC VISIT        Chief Complaint   Patient presents with    Follow-Up     Lab results, right shoulder pain, possible hernia.      Right shoulder pain  Right groin pain  Prostate cancer  Hypothyroidism  Hyperlipidemia  Prediabetes  Ulcerative colitis  Colon polyp  Constipation        History of Present Illness     Chronic right shoulder pain  Chronic condition.  The patient reported recurrent right shoulder pain.  Symptoms have been noted since the last 12 years.  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  Patient denies recent trauma or injury.  The patient denies motor weakness or paresthesia.  He has been taking ibuprofen as needed.    Right groin pain  New condition.  The patient reported recurrent pain/bulging in the right groin.  Patient is concerned of possible hernia.  He denies fever chills dysuria or hematuria.    Chronic constipation  Chronic condition.  Patient was seen previously by GI specialist.  He has a history of ulcerative colitis.  Colonoscopy completed in February 2024 showed patient with polyp.  GI recommend to repeat the colonoscopy in February 2026.  Patient denies fever chills abdominal pain or GI bleeding.  Patient reported that he has been drinking enough fluid and  fiber in his diet    Prostate cancer (HCC)  Chronic condition.  Followed by urology service.  Per patient report no treatment recommended at this time.  The patient is being monitored by the specialist on regular basis.  The patient denies fever chills dysuria or hematuria.    Colon polyp  Chronic condition.  The patient had colonoscopy February 2024 polyps noted.  GI recommend to repeat the study February 2026 due to history of ulcerative colitis.    Ulcerative colitis (HCC)  This is a chronic condition.  Followed by GI service.  Last colonoscopy completed February 2024.  GI recommend to repeat the study February 2026.  Symptoms are well-controlled per patient report.    Hypothyroid  Chronic  condition but the patient is taking levothyroxine.  Tolerating medication well.  TSH normal.    Dyslipidemia  Chronic condition.  Currently on diet therapy.  Recent lipid panel result discussed with the patient.  LDL level slightly elevated which was noted previously.    Prediabetes  Chronic condition.  Currently on diet therapy.  Recent lab test show improvement in the A1c and glucose.    Vitiligo  Chronic condition.  Symptoms are stable.  Patient currently not on therapy.  Today I offered her a referral to dermatology.  The patient declined.      Current Outpatient Medications on File Prior to Visit   Medication Sig Dispense Refill    ibuprofen (MOTRIN) 200 MG Tab Take 200 mg by mouth every 6 hours as needed.       No current facility-administered medications on file prior to visit.        Allergies: Patient has no known allergies.    Current Outpatient Medications Ordered in Epic   Medication Sig Dispense Refill    levothyroxine (SYNTHROID) 150 MCG Tab Take 1 Tablet by mouth every morning on an empty stomach. 90 Tablet 3    ibuprofen (MOTRIN) 200 MG Tab Take 200 mg by mouth every 6 hours as needed.       No current Epic-ordered facility-administered medications on file.       Past Medical History:   Diagnosis Date    Hypothyroid 5/16/2013    IBD (inflammatory bowel disease)     Midline low back pain without sciatica 10/26/2020    Vitiligo 05/16/2013       Past Surgical History:   Procedure Laterality Date    KNEE RECONSTRUCTION  1988    Right knee   [place]  ACL replacement        Family History   Problem Relation Age of Onset    Cancer Mother         Breast Cancer--metastatic    Heart Disease Father     Stroke Father     Stroke Brother     Stroke Sister         aneursym       Social History     Tobacco Use   Smoking Status Former    Current packs/day: 0.00    Average packs/day: 0.1 packs/day for 10.0 years (1.0 ttl pk-yrs)    Types: Cigarettes    Start date: 1/1/1976    Quit date: 1/1/1986    Years since  "quittin.6   Smokeless Tobacco Never       Social History     Substance and Sexual Activity   Alcohol Use Yes    Comment: 0-1 monthly       Review of systems  As per HPI above. All other systems reviewed and negative.      Past Medical, Social, and Family history reviewed and updated in [facility]       LAB DATA:     I have independently reviewed / interpreted labs    Lab Results   Component Value Date/Time    HBA1C 5.1 2024 07:21 AM    HBA1C 5.1 10/07/2022 08:06 AM    HBA1C 5.2 2021 07:18 AM        Lab Results   Component Value Date/Time    WBC 6.5 10/07/2022 08:06 AM    HEMOGLOBIN 16.3 10/07/2022 08:06 AM    HEMATOCRIT 47.6 10/07/2022 08:06 AM    MCV 87.7 10/07/2022 08:06 AM    PLATELETCT 188 10/07/2022 08:06 AM       Lab Results   Component Value Date/Time    SODIUM 140 2024 07:21 AM    POTASSIUM 4.1 2024 07:21 AM    GLUCOSE 98 2024 07:21 AM    BUN 12 2024 07:21 AM    CREATININE 1.06 2024 07:21 AM       Lab Results   Component Value Date/Time    CHOLSTRLTOT 173 2024 07:21 AM    TRIGLYCERIDE 115 2024 07:21 AM    HDL 41 2024 07:21 AM     (H) 2024 07:21 AM       Lab Results   Component Value Date/Time    ALTSGPT 12 2024 07:21 AM          Objective     /84 (BP Location: Right arm, Patient Position: Sitting, BP Cuff Size: Adult)   Pulse 64   Temp 36.6 °C (97.8 °F) (Temporal)   Ht 1.854 m (6' 1\")   Wt 72.5 kg (159 lb 12.8 oz)   SpO2 99%    Body mass index is 21.08 kg/m².    General: alert in no apparent distress.  Cardiovascular: regular rate and rhythm  Pulmonary: lungs : no wheezing   Gastrointestinal: BS present.   Right groin no obvious hernia bulging noted on exam    Right shoulder there is no swelling redness or deformity.  Range of motion is restricted due to pain especially with shoulder abduction      Skin showed hypopigmented macular changes throughout the body.    Assessment and Plan     1. Chronic right shoulder " pain  Chronic worsening condition.  Suspect possible rotator cuff injury/tear.  Rule out generative joint versus others  Recommend  - DX-SHOULDER 2+ RIGHT; Future  - Referral to Orthopedics    2. Right groin pain  New condition.  Suspect possible hernia.  - US-INGUINAL HERNIA; Future    3. Acquired hypothyroidism  Chronic stable condition.  Continue  levothyroxine 150 mcg daily.    4. Prostate cancer (HCC)  Chronic condition.  Currently under active surveillance by urology service.  Patient has pending appointment to follow-up with the specialist    5. Dyslipidemia  Chronic stable condition.  Continue with diet and exercise.  Lab test result discussed with the patient.  6. Prediabetes  Chronic condition.  Improved status.  Lab test result discussed with the patient.  Recommend low sweet low-carb diet.  Continue to monitor    7. Ulcerative colitis with complication, unspecified location (HCC)  Chronic condition.  Stable.  Continue follow-up with GI specialist.  Next colonoscopy February 2026.    8. Pseudopolyposis of colon, unspecified complication status, unspecified part of colon (HCC)  Chronic stable condition noted by colonoscopy recently in February 2024.  Patient asymptomatic.  As above GI recommend to repeat the colonoscopy February 2026    9. Chronic constipation  Chronic condition.  Followed by GI service.  Uncontrolled.  Constipation is a fairly common problem. Listed below are things you can do to help with this issue:      I would recommend to start with the first suggestion listed, and if that is not enough to help then keep working your way down the list until you get to the point where you are having normal bowel movements, then try backing off the list to see if you can maintain normal bowel movements with something less aggressive.    1)  Increasing water intake to about 80 ounces a day (a bit more than a half gallon of water) can help.  In addition, regular exercise is important.  Making sure your  diet includes plenty of fruits and vegetable. Try Prune juice    2)  Adding additional fiber to your diet with products like Metamucil one dose daily can help by adding bulk to your stool, keeping it from getting quite so packed down. If needed, you can increase Metamucil to 2 doses daily.     3) Polyethylene glycol (Miralax or its generic equivalent) is an osmotic laxative, meaning it brings extra water into your colon, helping keep your stool from getting hard and packed down.  One capful a day should give normal soft stool within about 2-3 days.  If no improvement noted after 2 weeks, please consider trying 1 capful twice a day.  Try Stool softener [Colace] 100mg 2x a day as needed    4) Milk of magnesia (30 ml daily) or Magnesium citrate (1/2 to 1 bottle daily), or a Bisacodyl (Dulcolax) suppository can be used next to get things moving.    5) If this has been ineffective, using Senna-S, 1-2 tablets one to two times daily can be helpful.    6) If all these measures have been ineffective, you can try a mineral oil enema or a warm tap water enema to see if this helps things.    For any symptoms such as: abdominal pain, fever, chill, nausea, vomiting, rectal bleeding /blood with bowel movements >> Rec pt to go to ER to be evaluated.       10. Vitiligo  Chronic stable condition.  As above the patient declined dermatology referral.            Attestation: I spent:   43  minutes -    That includes time for chart review before the visit, the actual patient visit, and time spent on documentation in the EMR after the visit.  Chart review/prep, review of other providers' records, Independent review of imagings/labs , face-to-face time for history/examination, pt's counseling/education, ordering, prescribing, treatment plan discussed with patient, and care coordination.                Please note that this dictation was created using voice recognition software. I have made every reasonable attempt to correct obvious  errors, but I expect that there are errors of grammar and possibly content that I did not discover before finalizing the note.    Zhou Cardenas MD  Internal Medicine  [facility]

## 2024-08-27 NOTE — ASSESSMENT & PLAN NOTE
Chronic condition.  The patient had colonoscopy February 2024 polyps noted.  GI recommend to repeat the study February 2026 due to history of ulcerative colitis.

## 2024-08-27 NOTE — ASSESSMENT & PLAN NOTE
New condition.  The patient reported recurrent pain/bulging in the right groin.  Patient is concerned of possible hernia.  He denies fever chills dysuria or hematuria.

## 2024-08-27 NOTE — ASSESSMENT & PLAN NOTE
Chronic condition.  Followed by urology service.  Per patient report no treatment recommended at this time.  The patient is being monitored by the specialist on regular basis.  The patient denies fever chills dysuria or hematuria.

## 2024-08-27 NOTE — ASSESSMENT & PLAN NOTE
Chronic condition but the patient is taking levothyroxine.  Tolerating medication well.  TSH normal.

## 2024-08-27 NOTE — ASSESSMENT & PLAN NOTE
This is a chronic condition.  Followed by GI service.  Last colonoscopy completed February 2024.  GI recommend to repeat the study February 2026.  Symptoms are well-controlled per patient report.

## 2024-08-27 NOTE — ASSESSMENT & PLAN NOTE
Chronic condition.  Symptoms are stable.  Patient currently not on therapy.  Today I offered her a referral to dermatology.  The patient declined.

## 2024-08-27 NOTE — ASSESSMENT & PLAN NOTE
Chronic condition.  Patient was seen previously by GI specialist.  He has a history of ulcerative colitis.  Colonoscopy completed in February 2024 showed patient with polyp.  GI recommend to repeat the colonoscopy in February 2026.  Patient denies fever chills abdominal pain or GI bleeding.  Patient reported that he has been drinking enough fluid and  fiber in his diet

## 2024-08-27 NOTE — ASSESSMENT & PLAN NOTE
Chronic condition.  The patient reported recurrent right shoulder pain.  Symptoms have been noted since the last 12 years.  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  Patient denies recent trauma or injury.  The patient denies motor weakness or paresthesia.  He has been taking ibuprofen as needed.

## 2024-08-27 NOTE — ASSESSMENT & PLAN NOTE
Chronic condition.  Currently on diet therapy.  Recent lipid panel result discussed with the patient.  LDL level slightly elevated which was noted previously.

## 2024-08-27 NOTE — ASSESSMENT & PLAN NOTE
Chronic condition.  Currently on diet therapy.  Recent lab test show improvement in the A1c and glucose.

## 2024-09-05 ENCOUNTER — OFFICE VISIT (OUTPATIENT)
Dept: URGENT CARE | Facility: PHYSICIAN GROUP | Age: 64
End: 2024-09-05
Payer: COMMERCIAL

## 2024-09-05 VITALS
WEIGHT: 160 LBS | HEIGHT: 73 IN | RESPIRATION RATE: 16 BRPM | HEART RATE: 65 BPM | TEMPERATURE: 97 F | SYSTOLIC BLOOD PRESSURE: 122 MMHG | OXYGEN SATURATION: 99 % | BODY MASS INDEX: 21.2 KG/M2 | DIASTOLIC BLOOD PRESSURE: 68 MMHG

## 2024-09-05 DIAGNOSIS — M10.00 ACUTE IDIOPATHIC GOUT, UNSPECIFIED SITE: ICD-10-CM

## 2024-09-05 PROCEDURE — 3078F DIAST BP <80 MM HG: CPT | Performed by: NURSE PRACTITIONER

## 2024-09-05 PROCEDURE — 3074F SYST BP LT 130 MM HG: CPT | Performed by: NURSE PRACTITIONER

## 2024-09-05 PROCEDURE — 99213 OFFICE O/P EST LOW 20 MIN: CPT | Performed by: NURSE PRACTITIONER

## 2024-09-05 RX ORDER — KETOROLAC TROMETHAMINE 15 MG/ML
15 INJECTION, SOLUTION INTRAMUSCULAR; INTRAVENOUS ONCE
Status: COMPLETED | OUTPATIENT
Start: 2024-09-05 | End: 2024-09-05

## 2024-09-05 RX ORDER — CEPHALEXIN 500 MG/1
500 CAPSULE ORAL 4 TIMES DAILY
Qty: 28 CAPSULE | Refills: 0 | Status: SHIPPED | OUTPATIENT
Start: 2024-09-05 | End: 2024-09-12

## 2024-09-05 RX ORDER — PREDNISONE 20 MG/1
TABLET ORAL
Qty: 10 TABLET | Refills: 0 | Status: SHIPPED | OUTPATIENT
Start: 2024-09-05 | End: 2024-09-24

## 2024-09-05 RX ADMIN — KETOROLAC TROMETHAMINE 15 MG: 15 INJECTION, SOLUTION INTRAMUSCULAR; INTRAVENOUS at 18:36

## 2024-09-05 ASSESSMENT — ENCOUNTER SYMPTOMS
CHILLS: 0
FEVER: 0
TINGLING: 0
ROS SKIN COMMENTS: +ERYTHEMA
SENSORY CHANGE: 0
MYALGIAS: 0
FOCAL WEAKNESS: 0

## 2024-09-06 NOTE — PROGRESS NOTES
Subjective     Estrada Toussaint is a 64 y.o. male who presents with Foot Problem (Right foot pain, red, swollen, right pinky toe, hurts to put any weight on it, tender to the touch, sx started on , noticed it on the following morning because of the pain, )            HPI  New problem.  Patient is a very pleasant 64-year-old male who presents with right foot swelling, and redness from his pinky toe into the dorsum of his foot.  He describes severe constant throbbing pain with this.  He also reports tenderness to palpation.  His symptoms started on .  He denies fever, chills, or myalgia, he denies any other joint pain.  He reports that the redness has not moved.  He has been treating this with ibuprofen with minimal relief.  He has also been icing.  He has no prior history of gout in his past.    Patient has no known allergies.  Current Outpatient Medications on File Prior to Visit   Medication Sig Dispense Refill    levothyroxine (SYNTHROID) 150 MCG Tab Take 1 Tablet by mouth every morning on an empty stomach. 90 Tablet 3    ibuprofen (MOTRIN) 200 MG Tab Take 200 mg by mouth every 6 hours as needed.       No current facility-administered medications on file prior to visit.     Social History     Socioeconomic History    Marital status:      Spouse name: Not on file    Number of children: Not on file    Years of education: Not on file    Highest education level: Not on file   Occupational History    Not on file   Tobacco Use    Smoking status: Former     Current packs/day: 0.00     Average packs/day: 0.1 packs/day for 10.0 years (1.0 ttl pk-yrs)     Types: Cigarettes     Start date: 1976     Quit date: 1986     Years since quittin.7    Smokeless tobacco: Never   Vaping Use    Vaping status: Never Used   Substance and Sexual Activity    Alcohol use: Yes     Comment: 0-1 monthly    Drug use: No    Sexual activity: Yes     Partners: Female     Birth control/protection:  "Post-Menopausal, Surgical     Comment: WIfe has had tubal ligation   Other Topics Concern    Not on file   Social History Narrative    Not on file     Social Determinants of Health     Financial Resource Strain: Not on file   Food Insecurity: Not on file   Transportation Needs: Not on file   Physical Activity: Not on file   Stress: Not on file   Social Connections: Not on file   Intimate Partner Violence: Not on file   Housing Stability: Not on file     Breast Cancer-related family history is not on file.      Review of Systems   Constitutional:  Negative for chills, fever and malaise/fatigue.   Musculoskeletal:  Positive for joint pain. Negative for myalgias.   Skin:         +erythema   Neurological:  Negative for tingling, sensory change and focal weakness.   All other systems reviewed and are negative.             Objective     /68 (BP Location: Left arm, Patient Position: Sitting, BP Cuff Size: Adult long)   Pulse 65   Temp 36.1 °C (97 °F) (Temporal)   Resp 16   Ht 1.854 m (6' 1\")   Wt 72.6 kg (160 lb)   SpO2 99%   BMI 21.11 kg/m²      Physical Exam  Vitals and nursing note reviewed.   Constitutional:       Appearance: Normal appearance. He is not ill-appearing.   Cardiovascular:      Rate and Rhythm: Normal rate and regular rhythm.      Heart sounds: No murmur heard.  Pulmonary:      Effort: Pulmonary effort is normal.      Breath sounds: Normal breath sounds.   Musculoskeletal:      Right foot: Decreased range of motion. Swelling, tenderness and bony tenderness present.        Legs:    Skin:     General: Skin is warm and dry.      Findings: Erythema present.   Neurological:      General: No focal deficit present.      Mental Status: He is alert and oriented to person, place, and time.                             Assessment & Plan        Assessment & Plan  Acute idiopathic gout, unspecified site    Orders:    ketorolac (Toradol) 15 MG/ML injection 15 mg    predniSONE (DELTASONE) 20 MG Tab; Take 2 " tabs daily for 5 days.    cephALEXin (KEFLEX) 500 MG Cap; Take 1 Capsule by mouth 4 times a day for 7 days.     Gouty arthritis versus cellulitis with stronger suspicion for gout.   Toradol and start prednisone tomorrow. No nsaids while on steroid  If redness should migrate then please begin antibiotic but likely won't need.  Differential diagnosis, natural history, supportive care, and indications for immediate follow-up were discussed.

## 2024-09-16 ENCOUNTER — HOSPITAL ENCOUNTER (OUTPATIENT)
Dept: RADIOLOGY | Facility: MEDICAL CENTER | Age: 64
End: 2024-09-16
Attending: INTERNAL MEDICINE
Payer: COMMERCIAL

## 2024-09-16 DIAGNOSIS — K40.20 BILATERAL INGUINAL HERNIA WITHOUT OBSTRUCTION OR GANGRENE, RECURRENCE NOT SPECIFIED: ICD-10-CM

## 2024-09-16 DIAGNOSIS — R10.31 RIGHT GROIN PAIN: ICD-10-CM

## 2024-09-16 PROCEDURE — 76857 US EXAM PELVIC LIMITED: CPT

## 2024-09-24 PROBLEM — S43.431A LABRAL TEAR OF SHOULDER, RIGHT, INITIAL ENCOUNTER: Status: ACTIVE | Noted: 2024-09-24

## 2024-09-24 PROBLEM — M75.41 SUBACROMIAL IMPINGEMENT OF RIGHT SHOULDER: Status: ACTIVE | Noted: 2024-09-24

## 2024-09-24 PROBLEM — M75.121 COMPLETE ROTATOR CUFF TEAR OR RUPTURE OF RIGHT SHOULDER, NOT SPECIFIED AS TRAUMATIC: Status: ACTIVE | Noted: 2024-09-24

## 2024-09-24 PROBLEM — M75.21 BICEPS TENDINITIS OF RIGHT SHOULDER: Status: ACTIVE | Noted: 2024-09-24

## 2024-10-09 ENCOUNTER — APPOINTMENT (OUTPATIENT)
Dept: SURGICAL ONCOLOGY | Facility: MEDICAL CENTER | Age: 64
End: 2024-10-09
Payer: COMMERCIAL

## 2024-10-09 VITALS
BODY MASS INDEX: 21.34 KG/M2 | HEART RATE: 72 BPM | TEMPERATURE: 97.4 F | WEIGHT: 161 LBS | SYSTOLIC BLOOD PRESSURE: 132 MMHG | DIASTOLIC BLOOD PRESSURE: 88 MMHG | OXYGEN SATURATION: 99 % | HEIGHT: 73 IN

## 2024-10-09 DIAGNOSIS — K40.20 NON-RECURRENT BILATERAL INGUINAL HERNIA WITHOUT OBSTRUCTION OR GANGRENE: ICD-10-CM

## 2024-10-09 PROCEDURE — 3079F DIAST BP 80-89 MM HG: CPT | Performed by: SURGERY

## 2024-10-09 PROCEDURE — 3075F SYST BP GE 130 - 139MM HG: CPT | Performed by: SURGERY

## 2024-10-09 PROCEDURE — 99204 OFFICE O/P NEW MOD 45 MIN: CPT | Mod: 57 | Performed by: SURGERY

## 2024-10-16 ENCOUNTER — OFFICE VISIT (OUTPATIENT)
Dept: MEDICAL GROUP | Facility: PHYSICIAN GROUP | Age: 64
End: 2024-10-16
Payer: COMMERCIAL

## 2024-10-16 VITALS
SYSTOLIC BLOOD PRESSURE: 114 MMHG | TEMPERATURE: 97.6 F | WEIGHT: 160.7 LBS | OXYGEN SATURATION: 98 % | HEART RATE: 72 BPM | BODY MASS INDEX: 21.3 KG/M2 | HEIGHT: 73 IN | DIASTOLIC BLOOD PRESSURE: 72 MMHG

## 2024-10-16 DIAGNOSIS — E78.5 DYSLIPIDEMIA: Chronic | ICD-10-CM

## 2024-10-16 DIAGNOSIS — C61 PROSTATE CANCER (HCC): Chronic | ICD-10-CM

## 2024-10-16 DIAGNOSIS — E03.9 ACQUIRED HYPOTHYROIDISM: Chronic | ICD-10-CM

## 2024-10-16 DIAGNOSIS — R73.03 PREDIABETES: Chronic | ICD-10-CM

## 2024-10-16 DIAGNOSIS — K51.919 ULCERATIVE COLITIS WITH COMPLICATION, UNSPECIFIED LOCATION (HCC): ICD-10-CM

## 2024-10-16 DIAGNOSIS — K40.20 NON-RECURRENT BILATERAL INGUINAL HERNIA WITHOUT OBSTRUCTION OR GANGRENE: Chronic | ICD-10-CM

## 2024-10-16 DIAGNOSIS — Z00.00 WELL ADULT EXAM: ICD-10-CM

## 2024-10-16 DIAGNOSIS — M79.671 RIGHT FOOT PAIN: ICD-10-CM

## 2024-10-16 PROCEDURE — 3074F SYST BP LT 130 MM HG: CPT | Performed by: INTERNAL MEDICINE

## 2024-10-16 PROCEDURE — 3078F DIAST BP <80 MM HG: CPT | Performed by: INTERNAL MEDICINE

## 2024-10-16 PROCEDURE — 99214 OFFICE O/P EST MOD 30 MIN: CPT | Performed by: INTERNAL MEDICINE

## 2024-10-28 ENCOUNTER — APPOINTMENT (OUTPATIENT)
Dept: ADMISSIONS | Facility: MEDICAL CENTER | Age: 64
End: 2024-10-28
Attending: SURGERY
Payer: COMMERCIAL

## 2024-10-29 ENCOUNTER — HOSPITAL ENCOUNTER (OUTPATIENT)
Dept: RADIOLOGY | Facility: MEDICAL CENTER | Age: 64
End: 2024-10-29
Attending: INTERNAL MEDICINE
Payer: COMMERCIAL

## 2024-10-29 ENCOUNTER — HOSPITAL ENCOUNTER (OUTPATIENT)
Dept: LAB | Facility: MEDICAL CENTER | Age: 64
End: 2024-10-29
Attending: INTERNAL MEDICINE
Payer: COMMERCIAL

## 2024-10-29 DIAGNOSIS — M79.671 RIGHT FOOT PAIN: ICD-10-CM

## 2024-10-29 DIAGNOSIS — E78.5 DYSLIPIDEMIA: Chronic | ICD-10-CM

## 2024-10-29 DIAGNOSIS — Z00.00 WELL ADULT EXAM: ICD-10-CM

## 2024-10-29 DIAGNOSIS — R73.03 PREDIABETES: Chronic | ICD-10-CM

## 2024-10-29 LAB
ALT SERPL-CCNC: 14 U/L (ref 2–50)
ANION GAP SERPL CALC-SCNC: 11 MMOL/L (ref 7–16)
BASOPHILS # BLD AUTO: 0.6 % (ref 0–1.8)
BASOPHILS # BLD: 0.05 K/UL (ref 0–0.12)
BUN SERPL-MCNC: 11 MG/DL (ref 8–22)
CALCIUM SERPL-MCNC: 9.6 MG/DL (ref 8.5–10.5)
CHLORIDE SERPL-SCNC: 98 MMOL/L (ref 96–112)
CHOLEST SERPL-MCNC: 193 MG/DL (ref 100–199)
CO2 SERPL-SCNC: 28 MMOL/L (ref 20–33)
CREAT SERPL-MCNC: 1.05 MG/DL (ref 0.5–1.4)
EOSINOPHIL # BLD AUTO: 0.37 K/UL (ref 0–0.51)
EOSINOPHIL NFR BLD: 4.5 % (ref 0–6.9)
ERYTHROCYTE [DISTWIDTH] IN BLOOD BY AUTOMATED COUNT: 43.7 FL (ref 35.9–50)
EST. AVERAGE GLUCOSE BLD GHB EST-MCNC: 120 MG/DL
FASTING STATUS PATIENT QL REPORTED: NORMAL
GFR SERPLBLD CREATININE-BSD FMLA CKD-EPI: 79 ML/MIN/1.73 M 2
GLUCOSE SERPL-MCNC: 90 MG/DL (ref 65–99)
HBA1C MFR BLD: 5.8 % (ref 4–5.6)
HCT VFR BLD AUTO: 46.9 % (ref 42–52)
HDLC SERPL-MCNC: 43 MG/DL
HGB BLD-MCNC: 16.3 G/DL (ref 14–18)
IMM GRANULOCYTES # BLD AUTO: 0.05 K/UL (ref 0–0.11)
IMM GRANULOCYTES NFR BLD AUTO: 0.6 % (ref 0–0.9)
LDLC SERPL CALC-MCNC: 124 MG/DL
LYMPHOCYTES # BLD AUTO: 1.38 K/UL (ref 1–4.8)
LYMPHOCYTES NFR BLD: 16.7 % (ref 22–41)
MCH RBC QN AUTO: 30.8 PG (ref 27–33)
MCHC RBC AUTO-ENTMCNC: 34.8 G/DL (ref 32.3–36.5)
MCV RBC AUTO: 88.5 FL (ref 81.4–97.8)
MONOCYTES # BLD AUTO: 0.74 K/UL (ref 0–0.85)
MONOCYTES NFR BLD AUTO: 9 % (ref 0–13.4)
NEUTROPHILS # BLD AUTO: 5.67 K/UL (ref 1.82–7.42)
NEUTROPHILS NFR BLD: 68.6 % (ref 44–72)
NRBC # BLD AUTO: 0 K/UL
NRBC BLD-RTO: 0 /100 WBC (ref 0–0.2)
PLATELET # BLD AUTO: 164 K/UL (ref 164–446)
PMV BLD AUTO: 9.3 FL (ref 9–12.9)
POTASSIUM SERPL-SCNC: 4.3 MMOL/L (ref 3.6–5.5)
RBC # BLD AUTO: 5.3 M/UL (ref 4.7–6.1)
SODIUM SERPL-SCNC: 137 MMOL/L (ref 135–145)
TRIGL SERPL-MCNC: 132 MG/DL (ref 0–149)
URATE SERPL-MCNC: 8.3 MG/DL (ref 2.5–8.3)
WBC # BLD AUTO: 8.3 K/UL (ref 4.8–10.8)

## 2024-10-29 PROCEDURE — 80061 LIPID PANEL: CPT

## 2024-10-29 PROCEDURE — 73630 X-RAY EXAM OF FOOT: CPT | Mod: RT

## 2024-10-29 PROCEDURE — 83036 HEMOGLOBIN GLYCOSYLATED A1C: CPT

## 2024-10-29 PROCEDURE — 84550 ASSAY OF BLOOD/URIC ACID: CPT

## 2024-10-29 PROCEDURE — 80048 BASIC METABOLIC PNL TOTAL CA: CPT

## 2024-10-29 PROCEDURE — 84460 ALANINE AMINO (ALT) (SGPT): CPT

## 2024-10-29 PROCEDURE — 85025 COMPLETE CBC W/AUTO DIFF WBC: CPT

## 2024-10-29 PROCEDURE — 36415 COLL VENOUS BLD VENIPUNCTURE: CPT

## 2024-11-01 ENCOUNTER — PRE-ADMISSION TESTING (OUTPATIENT)
Dept: ADMISSIONS | Facility: MEDICAL CENTER | Age: 64
End: 2024-11-01
Attending: SURGERY
Payer: COMMERCIAL

## 2024-11-01 RX ORDER — POLYETHYLENE GLYCOL 3350 17 G/17G
17 POWDER, FOR SOLUTION ORAL
Status: ON HOLD | COMMUNITY
End: 2024-11-21

## 2024-11-01 RX ORDER — DOCUSATE SODIUM 100 MG/1
100 CAPSULE, LIQUID FILLED ORAL 2 TIMES DAILY PRN
COMMUNITY

## 2024-11-01 RX ORDER — ACETAMINOPHEN 500 MG
1000 TABLET ORAL EVERY MORNING
COMMUNITY
End: 2024-11-23

## 2024-11-01 NOTE — OR NURSING
Pre admit appointment completed with Estrada Toussaint for surgery on 11/21/24. Medication and fasting instructions given per RN pre procedure protocol. Encouraged patient to increase oral intake the day prior to procedure including intake of electrolyte drinks such as Gatorade or electrolyte water and day of surgery he may have up to 16 oz of clear liquid up until 2 hours prior to surgery. Patient instructed to stop taking all vitamins and herbal supplements 7 days prior to surgery. Patient instructed to stop any NSAIDS 5 days prior to surgery, unless otherwise instructed by medical provider. Patient verbalizes understanding of all instructions given. No further questions at this time. Medication instruction sheet stapled to testing passport for patient to receive at testing appointment on 11/12/24.

## 2024-11-05 DIAGNOSIS — M79.671 RIGHT FOOT PAIN: ICD-10-CM

## 2024-11-05 RX ORDER — MELOXICAM 15 MG/1
15 TABLET ORAL
Qty: 30 TABLET | Refills: 1 | Status: ON HOLD | OUTPATIENT
Start: 2024-11-05 | End: 2024-11-25

## 2024-11-12 ENCOUNTER — APPOINTMENT (OUTPATIENT)
Dept: ADMISSIONS | Facility: MEDICAL CENTER | Age: 64
End: 2024-11-12
Attending: SURGERY
Payer: COMMERCIAL

## 2024-11-12 DIAGNOSIS — Z01.810 PRE-OPERATIVE CARDIOVASCULAR EXAMINATION: ICD-10-CM

## 2024-11-12 LAB — EKG IMPRESSION: NORMAL

## 2024-11-12 PROCEDURE — 93010 ELECTROCARDIOGRAM REPORT: CPT | Performed by: INTERNAL MEDICINE

## 2024-11-12 PROCEDURE — 93005 ELECTROCARDIOGRAM TRACING: CPT

## 2024-11-20 ENCOUNTER — ANESTHESIA EVENT (OUTPATIENT)
Dept: SURGERY | Facility: MEDICAL CENTER | Age: 64
End: 2024-11-20
Payer: COMMERCIAL

## 2024-11-20 PROBLEM — M19.90 ARTHRITIS: Status: ACTIVE | Noted: 2024-11-20

## 2024-11-21 ENCOUNTER — HOSPITAL ENCOUNTER (OUTPATIENT)
Facility: MEDICAL CENTER | Age: 64
End: 2024-11-21
Attending: SURGERY | Admitting: SURGERY
Payer: COMMERCIAL

## 2024-11-21 ENCOUNTER — ANESTHESIA (OUTPATIENT)
Dept: SURGERY | Facility: MEDICAL CENTER | Age: 64
End: 2024-11-21
Payer: COMMERCIAL

## 2024-11-21 ENCOUNTER — PHARMACY VISIT (OUTPATIENT)
Dept: PHARMACY | Facility: MEDICAL CENTER | Age: 64
End: 2024-11-21
Payer: COMMERCIAL

## 2024-11-21 VITALS
BODY MASS INDEX: 21.24 KG/M2 | WEIGHT: 160.27 LBS | OXYGEN SATURATION: 94 % | DIASTOLIC BLOOD PRESSURE: 77 MMHG | TEMPERATURE: 96.5 F | HEIGHT: 73 IN | HEART RATE: 96 BPM | SYSTOLIC BLOOD PRESSURE: 117 MMHG | RESPIRATION RATE: 17 BRPM

## 2024-11-21 DIAGNOSIS — R10.31 INGUINAL PAIN OF BOTH SIDES: ICD-10-CM

## 2024-11-21 DIAGNOSIS — R10.32 INGUINAL PAIN OF BOTH SIDES: ICD-10-CM

## 2024-11-21 PROCEDURE — 160048 HCHG OR STATISTICAL LEVEL 1-5: Performed by: SURGERY

## 2024-11-21 PROCEDURE — 700101 HCHG RX REV CODE 250: Performed by: STUDENT IN AN ORGANIZED HEALTH CARE EDUCATION/TRAINING PROGRAM

## 2024-11-21 PROCEDURE — 160002 HCHG RECOVERY MINUTES (STAT): Performed by: SURGERY

## 2024-11-21 PROCEDURE — 160042 HCHG SURGERY MINUTES - EA ADDL 1 MIN LEVEL 5: Performed by: SURGERY

## 2024-11-21 PROCEDURE — 700101 HCHG RX REV CODE 250: Performed by: SURGERY

## 2024-11-21 PROCEDURE — 160025 RECOVERY II MINUTES (STATS): Performed by: SURGERY

## 2024-11-21 PROCEDURE — A9270 NON-COVERED ITEM OR SERVICE: HCPCS | Performed by: STUDENT IN AN ORGANIZED HEALTH CARE EDUCATION/TRAINING PROGRAM

## 2024-11-21 PROCEDURE — 160047 HCHG PACU  - EA ADDL 30 MINS PHASE II: Performed by: SURGERY

## 2024-11-21 PROCEDURE — C1781 MESH (IMPLANTABLE): HCPCS | Performed by: SURGERY

## 2024-11-21 PROCEDURE — 160031 HCHG SURGERY MINUTES - 1ST 30 MINS LEVEL 5: Performed by: SURGERY

## 2024-11-21 PROCEDURE — 49650 LAP ING HERNIA REPAIR INIT: CPT | Performed by: SURGERY

## 2024-11-21 PROCEDURE — 700102 HCHG RX REV CODE 250 W/ 637 OVERRIDE(OP): Performed by: STUDENT IN AN ORGANIZED HEALTH CARE EDUCATION/TRAINING PROGRAM

## 2024-11-21 PROCEDURE — 700105 HCHG RX REV CODE 258: Performed by: STUDENT IN AN ORGANIZED HEALTH CARE EDUCATION/TRAINING PROGRAM

## 2024-11-21 PROCEDURE — 700111 HCHG RX REV CODE 636 W/ 250 OVERRIDE (IP): Mod: JZ | Performed by: STUDENT IN AN ORGANIZED HEALTH CARE EDUCATION/TRAINING PROGRAM

## 2024-11-21 PROCEDURE — 160046 HCHG PACU - 1ST 60 MINS PHASE II: Performed by: SURGERY

## 2024-11-21 PROCEDURE — 160035 HCHG PACU - 1ST 60 MINS PHASE I: Performed by: SURGERY

## 2024-11-21 PROCEDURE — RXMED WILLOW AMBULATORY MEDICATION CHARGE: Performed by: SURGERY

## 2024-11-21 PROCEDURE — 502714 HCHG ROBOTIC SURGERY SERVICES: Performed by: SURGERY

## 2024-11-21 PROCEDURE — 700111 HCHG RX REV CODE 636 W/ 250 OVERRIDE (IP): Performed by: SURGERY

## 2024-11-21 PROCEDURE — 160009 HCHG ANES TIME/MIN: Performed by: SURGERY

## 2024-11-21 DEVICE — MESH 3D MAX LEFT LG - (1EA/CA): Type: IMPLANTABLE DEVICE | Status: FUNCTIONAL

## 2024-11-21 DEVICE — MESH 3D MAX RIGHT 10.8 X 16CM - LARGE (1EA/CA): Type: IMPLANTABLE DEVICE | Status: FUNCTIONAL

## 2024-11-21 RX ORDER — HYDRALAZINE HYDROCHLORIDE 20 MG/ML
5 INJECTION INTRAMUSCULAR; INTRAVENOUS
Status: DISCONTINUED | OUTPATIENT
Start: 2024-11-21 | End: 2024-11-21 | Stop reason: HOSPADM

## 2024-11-21 RX ORDER — DEXAMETHASONE SODIUM PHOSPHATE 4 MG/ML
INJECTION, SOLUTION INTRA-ARTICULAR; INTRALESIONAL; INTRAMUSCULAR; INTRAVENOUS; SOFT TISSUE PRN
Status: DISCONTINUED | OUTPATIENT
Start: 2024-11-21 | End: 2024-11-21 | Stop reason: SURG

## 2024-11-21 RX ORDER — MIDAZOLAM HYDROCHLORIDE 1 MG/ML
INJECTION INTRAMUSCULAR; INTRAVENOUS PRN
Status: DISCONTINUED | OUTPATIENT
Start: 2024-11-21 | End: 2024-11-21 | Stop reason: SURG

## 2024-11-21 RX ORDER — OXYCODONE HCL 5 MG/5 ML
10 SOLUTION, ORAL ORAL
Status: COMPLETED | OUTPATIENT
Start: 2024-11-21 | End: 2024-11-21

## 2024-11-21 RX ORDER — POLYETHYLENE GLYCOL 3350 17 G/17G
17 POWDER, FOR SOLUTION ORAL
Qty: 14 EACH | Refills: 0 | Status: SHIPPED | OUTPATIENT
Start: 2024-11-21 | End: 2024-12-05

## 2024-11-21 RX ORDER — LABETALOL HYDROCHLORIDE 5 MG/ML
5 INJECTION, SOLUTION INTRAVENOUS
Status: DISCONTINUED | OUTPATIENT
Start: 2024-11-21 | End: 2024-11-21 | Stop reason: HOSPADM

## 2024-11-21 RX ORDER — HYDROMORPHONE HYDROCHLORIDE 2 MG/ML
INJECTION, SOLUTION INTRAMUSCULAR; INTRAVENOUS; SUBCUTANEOUS PRN
Status: DISCONTINUED | OUTPATIENT
Start: 2024-11-21 | End: 2024-11-21 | Stop reason: SURG

## 2024-11-21 RX ORDER — ONDANSETRON 2 MG/ML
4 INJECTION INTRAMUSCULAR; INTRAVENOUS
Status: COMPLETED | OUTPATIENT
Start: 2024-11-21 | End: 2024-11-21

## 2024-11-21 RX ORDER — EPHEDRINE SULFATE 50 MG/ML
5 INJECTION, SOLUTION INTRAVENOUS
Status: DISCONTINUED | OUTPATIENT
Start: 2024-11-21 | End: 2024-11-21 | Stop reason: HOSPADM

## 2024-11-21 RX ORDER — METHOCARBAMOL 500 MG/1
1000 TABLET, FILM COATED ORAL 3 TIMES DAILY PRN
Qty: 90 TABLET | Refills: 1 | Status: ON HOLD | OUTPATIENT
Start: 2024-11-21 | End: 2024-11-25

## 2024-11-21 RX ORDER — HYDROMORPHONE HYDROCHLORIDE 1 MG/ML
0.4 INJECTION, SOLUTION INTRAMUSCULAR; INTRAVENOUS; SUBCUTANEOUS
Status: DISCONTINUED | OUTPATIENT
Start: 2024-11-21 | End: 2024-11-21 | Stop reason: HOSPADM

## 2024-11-21 RX ORDER — HYDROMORPHONE HYDROCHLORIDE 1 MG/ML
0.1 INJECTION, SOLUTION INTRAMUSCULAR; INTRAVENOUS; SUBCUTANEOUS
Status: DISCONTINUED | OUTPATIENT
Start: 2024-11-21 | End: 2024-11-21 | Stop reason: HOSPADM

## 2024-11-21 RX ORDER — BUPIVACAINE HYDROCHLORIDE AND EPINEPHRINE 5; 5 MG/ML; UG/ML
INJECTION, SOLUTION PERINEURAL
Status: DISCONTINUED | OUTPATIENT
Start: 2024-11-21 | End: 2024-11-21 | Stop reason: HOSPADM

## 2024-11-21 RX ORDER — HALOPERIDOL 5 MG/ML
1 INJECTION INTRAMUSCULAR
Status: DISCONTINUED | OUTPATIENT
Start: 2024-11-21 | End: 2024-11-21 | Stop reason: HOSPADM

## 2024-11-21 RX ORDER — ACETAMINOPHEN 500 MG
1000 TABLET ORAL ONCE
Status: COMPLETED | OUTPATIENT
Start: 2024-11-21 | End: 2024-11-21

## 2024-11-21 RX ORDER — CEFAZOLIN SODIUM 1 G/3ML
INJECTION, POWDER, FOR SOLUTION INTRAMUSCULAR; INTRAVENOUS PRN
Status: DISCONTINUED | OUTPATIENT
Start: 2024-11-21 | End: 2024-11-21 | Stop reason: SURG

## 2024-11-21 RX ORDER — HYDROMORPHONE HYDROCHLORIDE 1 MG/ML
0.2 INJECTION, SOLUTION INTRAMUSCULAR; INTRAVENOUS; SUBCUTANEOUS
Status: DISCONTINUED | OUTPATIENT
Start: 2024-11-21 | End: 2024-11-21 | Stop reason: HOSPADM

## 2024-11-21 RX ORDER — ONDANSETRON 2 MG/ML
INJECTION INTRAMUSCULAR; INTRAVENOUS PRN
Status: DISCONTINUED | OUTPATIENT
Start: 2024-11-21 | End: 2024-11-21 | Stop reason: SURG

## 2024-11-21 RX ORDER — OXYCODONE AND ACETAMINOPHEN 5; 325 MG/1; MG/1
1 TABLET ORAL EVERY 4 HOURS PRN
Qty: 40 TABLET | Refills: 0 | Status: ON HOLD | OUTPATIENT
Start: 2024-11-21 | End: 2024-11-25

## 2024-11-21 RX ORDER — HYDROMORPHONE HYDROCHLORIDE 1 MG/ML
1.4 INJECTION, SOLUTION INTRAMUSCULAR; INTRAVENOUS; SUBCUTANEOUS ONCE
Status: CANCELLED | OUTPATIENT
Start: 2024-11-21 | End: 2024-11-22

## 2024-11-21 RX ORDER — DIPHENHYDRAMINE HYDROCHLORIDE 50 MG/ML
12.5 INJECTION INTRAMUSCULAR; INTRAVENOUS
Status: DISCONTINUED | OUTPATIENT
Start: 2024-11-21 | End: 2024-11-21 | Stop reason: HOSPADM

## 2024-11-21 RX ORDER — SODIUM CHLORIDE 9 MG/ML
INJECTION, SOLUTION INTRAVENOUS CONTINUOUS
Status: DISCONTINUED | OUTPATIENT
Start: 2024-11-21 | End: 2024-11-21 | Stop reason: HOSPADM

## 2024-11-21 RX ORDER — ALBUTEROL SULFATE 5 MG/ML
2.5 SOLUTION RESPIRATORY (INHALATION)
Status: DISCONTINUED | OUTPATIENT
Start: 2024-11-21 | End: 2024-11-21 | Stop reason: HOSPADM

## 2024-11-21 RX ORDER — SODIUM CHLORIDE 9 MG/ML
INJECTION, SOLUTION INTRAVENOUS
Status: DISCONTINUED | OUTPATIENT
Start: 2024-11-21 | End: 2024-11-21 | Stop reason: SURG

## 2024-11-21 RX ORDER — HEPARIN SODIUM 5000 [USP'U]/ML
5000 INJECTION, SOLUTION INTRAVENOUS; SUBCUTANEOUS
Status: COMPLETED | OUTPATIENT
Start: 2024-11-21 | End: 2024-11-21

## 2024-11-21 RX ORDER — OXYCODONE HCL 5 MG/5 ML
5 SOLUTION, ORAL ORAL
Status: COMPLETED | OUTPATIENT
Start: 2024-11-21 | End: 2024-11-21

## 2024-11-21 RX ORDER — LIDOCAINE HYDROCHLORIDE 20 MG/ML
INJECTION, SOLUTION EPIDURAL; INFILTRATION; INTRACAUDAL; PERINEURAL PRN
Status: DISCONTINUED | OUTPATIENT
Start: 2024-11-21 | End: 2024-11-21 | Stop reason: SURG

## 2024-11-21 RX ORDER — ROCURONIUM BROMIDE 10 MG/ML
INJECTION, SOLUTION INTRAVENOUS PRN
Status: DISCONTINUED | OUTPATIENT
Start: 2024-11-21 | End: 2024-11-21 | Stop reason: SURG

## 2024-11-21 RX ORDER — KETOROLAC TROMETHAMINE 15 MG/ML
15 INJECTION, SOLUTION INTRAMUSCULAR; INTRAVENOUS ONCE
Status: COMPLETED | OUTPATIENT
Start: 2024-11-21 | End: 2024-11-21

## 2024-11-21 RX ORDER — NAPROXEN 500 MG/1
500 TABLET ORAL
Qty: 60 TABLET | Refills: 1 | Status: ON HOLD | OUTPATIENT
Start: 2024-11-21 | End: 2024-11-25

## 2024-11-21 RX ADMIN — CEFAZOLIN 2 G: 1 INJECTION, POWDER, FOR SOLUTION INTRAMUSCULAR; INTRAVENOUS at 07:47

## 2024-11-21 RX ADMIN — HYDROMORPHONE HYDROCHLORIDE 0.4 MG: 1 INJECTION, SOLUTION INTRAMUSCULAR; INTRAVENOUS; SUBCUTANEOUS at 10:24

## 2024-11-21 RX ADMIN — PROPOFOL 200 MG: 10 INJECTION, EMULSION INTRAVENOUS at 07:38

## 2024-11-21 RX ADMIN — ONDANSETRON 4 MG: 2 INJECTION INTRAMUSCULAR; INTRAVENOUS at 11:18

## 2024-11-21 RX ADMIN — SODIUM CHLORIDE: 9 INJECTION, SOLUTION INTRAVENOUS at 07:30

## 2024-11-21 RX ADMIN — HYDROMORPHONE HYDROCHLORIDE 0.4 MG: 1 INJECTION, SOLUTION INTRAMUSCULAR; INTRAVENOUS; SUBCUTANEOUS at 10:30

## 2024-11-21 RX ADMIN — FENTANYL CITRATE 50 MCG: 50 INJECTION, SOLUTION INTRAMUSCULAR; INTRAVENOUS at 07:56

## 2024-11-21 RX ADMIN — ROCURONIUM BROMIDE 70 MG: 50 INJECTION, SOLUTION INTRAVENOUS at 07:38

## 2024-11-21 RX ADMIN — KETOROLAC TROMETHAMINE 15 MG: 15 INJECTION, SOLUTION INTRAMUSCULAR; INTRAVENOUS at 09:53

## 2024-11-21 RX ADMIN — OXYCODONE HYDROCHLORIDE 10 MG: 5 SOLUTION ORAL at 09:52

## 2024-11-21 RX ADMIN — HYDROMORPHONE HYDROCHLORIDE 1 MG: 2 INJECTION INTRAMUSCULAR; INTRAVENOUS; SUBCUTANEOUS at 08:06

## 2024-11-21 RX ADMIN — ACETAMINOPHEN 1000 MG: 500 TABLET ORAL at 06:39

## 2024-11-21 RX ADMIN — HEPARIN SODIUM 5000 UNITS: 5000 INJECTION, SOLUTION INTRAVENOUS; SUBCUTANEOUS at 06:39

## 2024-11-21 RX ADMIN — HYDROMORPHONE HYDROCHLORIDE 0.2 MG: 1 INJECTION, SOLUTION INTRAMUSCULAR; INTRAVENOUS; SUBCUTANEOUS at 10:36

## 2024-11-21 RX ADMIN — MIDAZOLAM HYDROCHLORIDE 2 MG: 1 INJECTION, SOLUTION INTRAMUSCULAR; INTRAVENOUS at 07:35

## 2024-11-21 RX ADMIN — LIDOCAINE HYDROCHLORIDE 100 MG: 20 INJECTION, SOLUTION EPIDURAL; INFILTRATION; INTRACAUDAL; PERINEURAL at 07:38

## 2024-11-21 RX ADMIN — DEXAMETHASONE SODIUM PHOSPHATE 8 MG: 4 INJECTION INTRA-ARTICULAR; INTRALESIONAL; INTRAMUSCULAR; INTRAVENOUS; SOFT TISSUE at 07:47

## 2024-11-21 RX ADMIN — ONDANSETRON 4 MG: 2 INJECTION INTRAMUSCULAR; INTRAVENOUS at 09:01

## 2024-11-21 RX ADMIN — FENTANYL CITRATE 50 MCG: 50 INJECTION, SOLUTION INTRAMUSCULAR; INTRAVENOUS at 07:38

## 2024-11-21 ASSESSMENT — PAIN DESCRIPTION - PAIN TYPE
TYPE: SURGICAL PAIN

## 2024-11-21 ASSESSMENT — PAIN SCALES - GENERAL: PAIN_LEVEL: 4

## 2024-11-21 NOTE — OP REPORT
DEPARTMENT OF SURGERY    OPERATIVE REPORT      DATE OF OPERATION: 11/21/2024      SURGEON: Raymond Lee MD MPH FACS [facility]      ASSISTANT(S): None    ANESTHESIOLOGIST(S): Wes Ramírez MD      ANESTHESIA: General with local anesthetic      PREOPERATIVE DIAGNOSIS: Symptomatic bilateral inguinal hernias      POSTOPERATIVE DIAGNOSIS: Symptomatic bilateral, indirect, inguinal hernias      OPERATION(S) PERFORMED: Robotic bilateral inguinal hernia repair with mesh (rTAPP)      ESTIMATED BLOOD LOSS: 5 ml      SPECIMEN(S): None      COMPLICATIONS: None      BACKGROUND: The patient is a 64 y.o. male with symptomatic bilateral inguinal hernias (R > L), associated with bilateral groin bulges and pain. Mr. Toussaint is scheduled to undergo a robotic vs laparoscopic bilateral inguinal hernia repair with mesh (DIONISIO procedure). The procedure and its associated risks, benefits, and alternatives were discussed with the patient. All his questions were answered and he agreed to proceed with the operation.      OPERATIVE FINDINGS: During this procedure, a moderate-size, right, indirect, inguinal hernia was identified, without contents. In addition, there was a small-size, left, indirect, inguinal hernia (without contents), as well. Robotic DIONISIO repair was performed in a standard fashion, with mesh.     OPERATIVE PROCEDURE: The site of surgery was marked by the operating surgeon in the preoperative holding area. The patient was brought into the operating room (OR) and positioned on the operating table in the supine position. Care was taken to make sure that all the extremities and bony prominences were adequately padded. Intermittent compression devices were placed on bilateral lower extremities. The patient was given Ancef 2 g IV prior to induction of anesthesia. After successful initiation of general endotracheal anesthesia, patient’s abdominal wall was shaved, and then prepped and draped in the usual sterile fashion. A  time out was completed verifying correct patient, procedure, site, positioning, and implant prior to beginning of the operation.      A small skin incision was created just superior to the umbilicus, and the skin and umbilicus were elevated with a sharp towel clamp. A Veress needle was inserted without incident and the saline drip test was negative. Pneumoperitoneum was created with CO2 to 15 mmHg pressure without any physiologic derangements. The abdomen was then entered by using the [employer] First Entry 5-mm trocar and a 5-mm, 0-degree scope in the same location, without incident. No injuries were noted from initial or subsequent trocar placement. Two 8-mm robotic trocars were placed laterally to the rectus muscle, on the patient's right and left. Next, Mr. Toussaint was placed in the Trendelenburg position. Diagnostic laparoscopy demonstrated a moderate-size, right, indirect, inguinal hernia, without contents. In addition, there was a small-size, left, indirect, inguinal hernia (without contents), as well. The da Alyssa Xi robotic system was successfully docked to the patient's pelvis and the remainder of this operation was performed with assistance of the robot.     Both inguinal regions were visualized and the median umbilical ligament, medial umbilical ligament, and lateral umbilical folds were identified on each side. Next, the preperitoneal space was entered about 4 cm superior to the anterior superior iliac spine on the right and the peritoneal flap was developed medially towards the medial umbilical ligament and then inferiorly towards the myopectineal space. It was further developed by exposing the inferior epigastric vessels and keeping them anterior to the dissection plane. The pubic symphysis was identified and the Presley’s ligament was dissected laterally to its junction with the iliac vein. The dissection was continued inferiorly to the iliopubic tract, with care taken to avoid injury to the femoral  branch of the genitofemoral nerve and the lateral femoral cutaneous nerve. The cord structures were carefully dissected out. Intraoperative findings were as above.      The indirect hernia sac was mobilized from the cord structures and reduced into the peritoneal cavity. A large (16 x 12 cm) piece of Bard 3D Max mesh was rolled longitudinally into a compact cylinder, and then passed through the camera trocar. The mesh was placed along the inferior aspect of the working space and unrolled into place to completely cover the direct, indirect, and femoral spaces. Next, it was secured into position by using interrupted 3-0 Vicryl sutures in the following locations: into the Presley's ligament, medial to inferior epigastric bundle on the rectus muscle (superior to the iliopubic tract) and lateral to the inferior epigastric bundle on the transverse abdominis muscle. Care was taken to avoid the inferior 'triangles', containing the iliac vessels and genital nerves. The peritoneal flap was closed over the mesh and secured in place with a running 3-0 PDS (Stratafix) suture.    Next, we directed out attention to the patient's left inguinal region. The preperitoneal space was entered about 4 cm superior to the anterior superior iliac spine and the peritoneal flap was developed medially towards the medial umbilical ligament and then inferiorly towards the myopectineal space. It was further developed by exposing the inferior epigastric vessels and keeping them anterior to the dissection plane. The pubic symphysis was identified and the Presley’s ligament was dissected laterally to its junction with the iliac vein. The dissection was continued inferiorly to the iliopubic tract, with care taken to avoid injury to the femoral branch of the genitofemoral nerve and the lateral femoral cutaneous nerve. The cord structures were carefully dissected out. Intraoperative findings were as above.      The indirect hernia sac was mobilized from  the cord structures and reduced into the peritoneal cavity. A large (16 x 12 cm) piece of Bard 3D Max mesh was rolled longitudinally into a compact cylinder, and then passed through the camera trocar. The mesh was placed along the inferior aspect of the working space and unrolled into place to completely cover the direct, indirect, and femoral spaces. Next, it was secured into position by using interrupted 3-0 Vicryl sutures in the following locations: into the Presley's ligament, medial to inferior epigastric bundle on the rectus muscle (superior to the iliopubic tract) and lateral to the inferior epigastric bundle on the transverse abdominis muscle. Care was taken to avoid the inferior 'triangles', containing the iliac vessels and genital nerves. The peritoneal flap was closed over the mesh and secured in place with a running 3-0 PDS (Stratafix) suture.       After ensuring adequate hemostasis using electrocautery, the insufflation was stopped and the robotic system was undocked. All trocars were removed and their skin incisions were closed using 4-0 Monocryl sutures. Dermabond was applied over each incision. The patient was then awakened from anesthesia and transferred to the recovery room in stable condition. At the conclusion of the case, the sponge, needle, and instrument counts were correct x 2.      Dr. Lee was present and scrubbed throughout the entirety of this case.

## 2024-11-21 NOTE — OR NURSING
1040 received patient forom PACU via srini , patient is A&Ox4, patient pain scale of 10/10 , with facial grimacing. Patient has 3 lap sites with dermabond, CDI, ice applied.   1053 called circulator to talk to the anesthesiologist and reported that patient pain scale of 10/10 after dose of toradol, oxycodone and dilaudid. Anesthesiologist verbal order of dilaudid 1.4 mg as needed for pain.   1101 pain scale 6/10 and getting better.  1114 complains of nausea, zofran given as ordered. Pain scale of 5/10.   1146 Reviewed all discharge instructions and answered any questions. IV discontinued. Pain scale is improving 4/10.   1207 IV discontinued.   1218 Pt discharged home in good and stable condition. Escorted downstairs via

## 2024-11-21 NOTE — DISCHARGE INSTRUCTIONS
HOME CARE INSTRUCTIONS    ACTIVITY: Rest and take it easy for the first 24 hours.  A responsible adult is recommended to remain with you during that time.  It is normal to feel sleepy.  We encourage you to not do anything that requires balance, judgment or coordination.    FOR 24 HOURS DO NOT:  Drive, operate machinery or run household appliances.  Drink beer or alcoholic beverages.  Make important decisions or sign legal documents.    SPECIAL INSTRUCTIONS:     DEPARTMENT OF SURGERY    Discharge Instructions for Inguinal Hernia Surgery      You just had surgery to repair an inguinal hernia caused by a weakness in the abdominal wall of your groin area.    Now that you are going home, follow the surgeon's instructions on self-care at home.    When You're in the Hospital    During surgery, you had anesthesia. This may have been general (asleep and pain-free) or spinal or epidural (numb from the waist down) anesthesia. If the hernia was small, it may have been repaired under local anesthesia (awake but pain-free).    The nurse will give you pain medicine and help you begin to move around. Rest and gentle movement are important for recovery.    You may go home the same day as surgery. Or the hospital stay may be 1 to 2 days. It will depend on the procedure that was done.    What to Expect at Home    After hernia repair:    If there are visible stitches on the skin, they will need to be removed at a follow-up visit with the surgeon. If stitches under the skin were used, they will dissolve on their own. The incisions are covered with a bandage or with a liquid adhesive (skin glue).    You may have pain, soreness, and stiffness at first, especially when moving about. This is normal.    You will also feel tired after surgery. This can last for a few weeks.    You will most likely return to normal activities in just a few weeks.    Men may have swelling and pain in their testicles.    There may be some bruising around the  groin and testicular area.    You may have trouble passing urine for the first few days.    Make sure you get plenty of rest the first 2 to 3 days after going home. Ask family and friends for help with daily activities while your movements are limited.    Managing Pain    Use any pain medicines as instructed by the surgeon or nurse. You may be given a prescription for a narcotic pain medicine. Over-the-counter pain medicine (ibuprofen, acetaminophen) can be used if the narcotic medicine is too strong.    Apply an ice pack or cold compress to the groin area for 30 to 45 minutes at a time for the first three days. This will help significantly with your pain and swelling. Wrap the compress or ice in a towel. This helps prevent cold injury to the skin.    Wound Care    There may be a bandage over the incision. Follow the surgeon's instructions for how long to leave it on and when to change it. If skin glue was used, a bandage may not have been used.    A little bleeding and drainage is normal for the first few days. Apply antibiotic ointment (bacitracin, polysporin) or another solution to the incision area if the surgeon or nurse told you to.    Wash the area with mild soap and water when the surgeon says it is OK to do so. Gently pat it dry. Do not take a bath, soak in a hot tub, or go swimming for the first week after surgery.    Diet During Recovery    Pain medicines can cause constipation. Eating some high-fiber foods and drinking plenty of water can help keep the bowels moving. Use over the counter fiber products if constipation does not improve.    Antibiotics can cause diarrhea. If this happens, try eating yogurt with live cultures or taking psyllium (Metamucil). Call the surgeon if the diarrhea does not get better.    Activity for Adults    Give yourself time to heal. You may gradually resume normal activities, such as walking, driving, and sexual activity, when you are ready. But you probably will not feel like  doing anything strenuous for a few weeks.    Do not drive if you are taking narcotic pain medicines.    Do not lift anything over 10 pounds (about a gallon of milk) for 4 weeks, or until your doctor tells you it is OK. If possible avoid doing any activity that causes pain, or pulls on the area of surgery. Older boys and men may want to wear an athletic supporter if they have swelling or pain in the testicles.    Check with the surgeon before returning to sports or other high-impact activities. Protect the incision area from the sun for 1 year to prevent noticeable scarring.    When to Call the Doctor    Call the surgeon if you have any of the following:    Difficulty breathing or chest pain (CALL 911)  Light headedness that does not go away after a few days  Severe pain or soreness that does not improve with time  Incisions are bleeding, red or warm to the touch, or have a thick, yellow, green, or pus-like drainage  Chills, or fever of 101°F (38.3°C), or higher  Swelling or pain in the testicles that is getting worse  Trouble urinating    Follow up Instructions:    Contact our office at [phone] for a follow-up appointment within 2 weeks following your procedure. Our office hours are Monday - Friday, 8.00 am - 4.00 pm. Please try to call during these hours when we are better able to assist you.    If you have any additional questions, please do not hesitate to call our office and speak to the staff or provider on call.    Office Address:    [facility]  [address] [place] [address],  [address],  [place] [address]    Thank you for this opportunity to provide excellent surgical care for you today.    MEDICATIONS: Resume taking daily medication.  Take prescribed pain medication with food.  If no medication is prescribed, you may take non-aspirin pain medication if needed.  PAIN MEDICATION CAN BE VERY CONSTIPATING.  Take a stool softener or laxative such as senokot, pericolace, or milk of magnesia if  needed.    Prescriptions sent to the pharmacy.  Last pain medication given at 09:53 AM    A follow-up appointment should be arranged with your doctor; call to schedule.    You should CALL YOUR PHYSICIAN if you develop:  Fever greater than 101 degrees F.  Pain not relieved by medication, or persistent nausea or vomiting.  Excessive bleeding (blood soaking through dressing) or unexpected drainage from the wound.  Extreme redness or swelling around the incision site, drainage of pus or foul smelling drainage.  Inability to urinate or empty your bladder within 8 hours.  Problems with breathing or chest pain.    You should call 911 if you develop problems with breathing or chest pain.  If you are unable to contact your doctor or surgical center, you should go to the nearest emergency room or urgent care center.  Physician's telephone #: [phone]    MILD FLU-LIKE SYMPTOMS ARE NORMAL.  YOU MAY EXPERIENCE GENERALIZED MUSCLE ACHES, THROAT IRRITATION, HEADACHE AND/OR SOME NAUSEA.    If any questions arise, call your doctor.  If your doctor is not available, please feel free to call the Surgical Center at [phone].  The Center is open Monday through Friday from 7AM to 7PM.      A registered nurse may call you a few days after your surgery to see how you are doing after your procedure.    You may also receive a survey in the mail within the next two weeks and we ask that you take a few moments to complete the survey and return it to us.  Our goal is to provide you with very good care and we value your comments.     Depression / Suicide Risk    As you are discharged from this [facility] facility, it is important to learn how to keep safe from harming yourself.    Recognize the warning signs:  Abrupt changes in personality, positive or negative- including increase in energy   Giving away possessions  Change in eating patterns- significant weight changes-  positive or negative  Change in sleeping patterns- unable to  sleep or sleeping all the time   Unwillingness or inability to communicate  Depression  Unusual sadness, discouragement and loneliness  Talk of wanting to die  Neglect of personal appearance   Rebelliousness- reckless behavior  Withdrawal from people/activities they love  Confusion- inability to concentrate     If you or a loved one observes any of these behaviors or has concerns about self-harm, here's what you can do:  Talk about it- your feelings and reasons for harming yourself  Remove any means that you might use to hurt yourself (examples: pills, rope, extension cords, firearm)  Get professional help from the community (Mental Health, Substance Abuse, psychological counseling)  Do not be alone:Call your Safe Contact- someone whom you trust who will be there for you.  Call your local CRISIS HOTLINE 283-0393 or [phone]  Call your local Children's Mobile Crisis Response Team [place] (759) 185-2328 or www.[employer]  Call the toll free National Suicide Prevention Hotlines   National Suicide Prevention Lifeline 326-615-PMNX (1391)  National Hope Line Network 800-SUICIDE (686-5631)    I acknowledge receipt and understanding of these Home Care instructions.

## 2024-11-21 NOTE — PROGRESS NOTES
Pharmacy Medication Reconciliation      ~Medication reconciliation updated and complete per patient   ~Allergies have been verified and updated   ~No oral ABX within the last 30 days  ~Patient home pharmacy :  [facility] [phone]      ~Anticoagulants (rivaroxaban, apixaban, edoxaban, dabigatran, warfarin, enoxaparin) taken in the last 14 days? No

## 2024-11-21 NOTE — OR NURSING
Patient arrived in PACU, VSS. Abdominal incision x3 closed with dermabond, CDI with no drainage. Patient medicated for pain. Report called to JERI Delvalle  Transported to phase 2 on room air.

## 2024-11-21 NOTE — ANESTHESIA PREPROCEDURE EVALUATION
Case: [id number] Date/Time: 11/21/24 0715    Procedure: REPAIR HERNIA INGUINAL ROBOT ASSISTED, USING DV5 (Abdomen)    Anesthesia type: General    Pre-op diagnosis: BILATERAL INGUINAL HERNIA    Location: [facility] OR 17 / SURGERY [facility]    Surgeons: Raymond Lee M.D.            Relevant Problems   ANESTHESIA (within normal limits)      PULMONARY (within normal limits)      NEURO (within normal limits)      CARDIAC (within normal limits)       (within normal limits)      ENDO   (positive) Hypothyroid, patient denied this      Other   (positive) Arthralgia of right temporomandibular joint   (positive) Arthritis   (positive) Bilateral inguinal hernia without obstruction or gangrene   (positive) Dyslipidemia   (positive) Lumbar radiculopathy   (positive) Prostate cancer (HCC)   (positive) Ulcerative colitis (HCC)     Lab Results   Component Value Date/Time    WBC 8.3 10/29/2024 07:44 AM    RBC 5.30 10/29/2024 07:44 AM    HEMOGLOBIN 16.3 10/29/2024 07:44 AM    HEMATOCRIT 46.9 10/29/2024 07:44 AM    MCV 88.5 10/29/2024 07:44 AM    MCH 30.8 10/29/2024 07:44 AM    MCHC 34.8 10/29/2024 07:44 AM    MPV 9.3 10/29/2024 07:44 AM    NEUTSPOLYS 68.60 10/29/2024 07:44 AM    LYMPHOCYTES 16.70 (L) 10/29/2024 07:44 AM    MONOCYTES 9.00 10/29/2024 07:44 AM    EOSINOPHILS 4.50 10/29/2024 07:44 AM    BASOPHILS 0.60 10/29/2024 07:44 AM    HYPOCHROMIA 1+ 10/05/2013 10:50 PM          Lab Results   Component Value Date/Time    SODIUM 137 10/29/2024 07:44 AM    POTASSIUM 4.3 10/29/2024 07:44 AM    CHLORIDE 98 10/29/2024 07:44 AM    CO2 28 10/29/2024 07:44 AM    GLUCOSE 90 10/29/2024 07:44 AM    BUN 11 10/29/2024 07:44 AM    CREATININE 1.05 10/29/2024 07:44 AM      eGFR 79    Results for orders placed or performed in visit on 11/12/24   ECG   Result Value Ref Range    Report       [facility]    Test Date:  2024-11-12  Pt Name:    JANELLE MCKEON               Department: [facility]  MRN:        [id number]                       Room:  Gender:     Male                         Technician: MARSHA  :        1960                   Requested By:ANITA EUBANKS UMBERTOBREEDISHA  Order #:    [id number]                    Reading MD: Emanuel Hdz MD    Measurements  Intervals                                Axis  Rate:       78                           P:          80  CA:         162                          QRS:        67  QRSD:       89                           T:          53  QT:         377  QTc:        430    Interpretive Statements  Sinus rhythm  Borderline low voltage, extremity leads  Compared to ECG 10/05/2013 20:35:51  No significant changes  Electronically Signed On 2024 16:14:39 PST by Emanuel Hdz MD           Physical Exam    Airway   Mallampati: II  TM distance: >3 FB  Neck ROM: full       Cardiovascular - normal exam  Rhythm: regular  Rate: normal     Dental - normal exam           Pulmonary - normal exam  Breath sounds clear to auscultation     Abdominal    Neurological - normal exam                   Anesthesia Plan    ASA 2       Plan - general       Airway plan will be ETT          Induction: intravenous    Postoperative Plan: Postoperative administration of opioids is intended.    Pertinent diagnostic labs and testing reviewed    Informed Consent:    Anesthetic plan and risks discussed with patient.    Use of blood products discussed with: patient whom consented to blood products.       Risks of general anesthesia discussed including sore throat, damage to lips/teeth, anaphylaxis/drug reaction, aspiration, awareness, post-op nausea/vomiting, post-op delirium, myocardial infarction, cerebral vascular accident up to and including death.

## 2024-11-21 NOTE — ANESTHESIA TIME REPORT
Anesthesia Start and Stop Event Times       Date Time Event    11/21/2024 0713 Ready for Procedure     0730 Anesthesia Start          Responsible Staff  11/21/24      Name Role Begin End    Wes Ramírez M.D. Anesth 0730           Overtime Reason:  no overtime (within assigned shift)    Comments:

## 2024-11-22 ENCOUNTER — HOSPITAL ENCOUNTER (INPATIENT)
Facility: MEDICAL CENTER | Age: 64
LOS: 2 days | End: 2024-11-25
Attending: EMERGENCY MEDICINE | Admitting: STUDENT IN AN ORGANIZED HEALTH CARE EDUCATION/TRAINING PROGRAM
Payer: COMMERCIAL

## 2024-11-22 DIAGNOSIS — T14.8XXA HEMATOMA: ICD-10-CM

## 2024-11-22 DIAGNOSIS — H59.333: ICD-10-CM

## 2024-11-22 DIAGNOSIS — G89.18 POST-OP PAIN: ICD-10-CM

## 2024-11-22 DIAGNOSIS — K40.20 NON-RECURRENT BILATERAL INGUINAL HERNIA WITHOUT OBSTRUCTION OR GANGRENE: Chronic | ICD-10-CM

## 2024-11-22 DIAGNOSIS — S30.1XXA HEMATOMA OF RIGHT INGUINAL REGION: ICD-10-CM

## 2024-11-22 PROCEDURE — 99285 EMERGENCY DEPT VISIT HI MDM: CPT

## 2024-11-22 PROCEDURE — 36415 COLL VENOUS BLD VENIPUNCTURE: CPT

## 2024-11-22 ASSESSMENT — PAIN DESCRIPTION - PAIN TYPE: TYPE: ACUTE PAIN

## 2024-11-22 NOTE — ANESTHESIA POSTPROCEDURE EVALUATION
Patient: Estrada Toussaint    Procedure Summary       Date: 11/21/24 Room / Location: [place] [address] / SURGERY [facility]    Anesthesia Start: 0730 Anesthesia Stop: 0929    Procedure: REPAIR BILATERAL HERNIA INGUINAL ROBOT ASSISTED, USING DV5 (Bilateral: Abdomen) Diagnosis: (BILATERAL INGUINAL HERNIA)    Surgeons: Raymond Lee M.D. Responsible Provider: Wes Ramírez M.D.    Anesthesia Type: general ASA Status: 2            Final Anesthesia Type: general  Last vitals  BP   Blood Pressure: 117/77    Temp   35.8 °C (96.5 °F)    Pulse   96   Resp   17    SpO2   94 %      Anesthesia Post Evaluation    Patient location during evaluation: PACU  Patient participation: complete - patient participated  Level of consciousness: awake and alert  Pain score: 4    Airway patency: patent  Anesthetic complications: no  Cardiovascular status: hemodynamically stable  Respiratory status: acceptable  Hydration status: euvolemic    PONV: none          There were no known notable events for this encounter.     Nurse Pain Score: 4 (NPRS)

## 2024-11-23 ENCOUNTER — APPOINTMENT (OUTPATIENT)
Dept: RADIOLOGY | Facility: MEDICAL CENTER | Age: 64
End: 2024-11-23
Attending: EMERGENCY MEDICINE
Payer: COMMERCIAL

## 2024-11-23 PROBLEM — T14.8XXA HEMATOMA: Status: ACTIVE | Noted: 2024-11-23

## 2024-11-23 PROBLEM — H59.333: Status: ACTIVE | Noted: 2024-11-23

## 2024-11-23 PROBLEM — H59.333: Status: RESOLVED | Noted: 2024-11-23 | Resolved: 2024-11-23

## 2024-11-23 LAB
ALBUMIN SERPL BCP-MCNC: 4.5 G/DL (ref 3.2–4.9)
ALBUMIN/GLOB SERPL: 1.8 G/DL
ALP SERPL-CCNC: 69 U/L (ref 30–99)
ALT SERPL-CCNC: 16 U/L (ref 2–50)
ANION GAP SERPL CALC-SCNC: 18 MMOL/L (ref 7–16)
APPEARANCE UR: CLEAR
AST SERPL-CCNC: 18 U/L (ref 12–45)
BASOPHILS # BLD AUTO: 0.3 % (ref 0–1.8)
BASOPHILS # BLD: 0.03 K/UL (ref 0–0.12)
BILIRUB SERPL-MCNC: 1 MG/DL (ref 0.1–1.5)
BILIRUB UR QL STRIP.AUTO: NEGATIVE
BUN SERPL-MCNC: 15 MG/DL (ref 8–22)
CALCIUM ALBUM COR SERPL-MCNC: 8.9 MG/DL (ref 8.5–10.5)
CALCIUM SERPL-MCNC: 9.3 MG/DL (ref 8.5–10.5)
CHLORIDE SERPL-SCNC: 100 MMOL/L (ref 96–112)
CO2 SERPL-SCNC: 22 MMOL/L (ref 20–33)
COLOR UR: YELLOW
CREAT SERPL-MCNC: 0.98 MG/DL (ref 0.5–1.4)
EOSINOPHIL # BLD AUTO: 0.03 K/UL (ref 0–0.51)
EOSINOPHIL NFR BLD: 0.3 % (ref 0–6.9)
ERYTHROCYTE [DISTWIDTH] IN BLOOD BY AUTOMATED COUNT: 42.3 FL (ref 35.9–50)
GFR SERPLBLD CREATININE-BSD FMLA CKD-EPI: 86 ML/MIN/1.73 M 2
GLOBULIN SER CALC-MCNC: 2.5 G/DL (ref 1.9–3.5)
GLUCOSE SERPL-MCNC: 115 MG/DL (ref 65–99)
GLUCOSE UR STRIP.AUTO-MCNC: NEGATIVE MG/DL
HCT VFR BLD AUTO: 30.3 % (ref 42–52)
HCT VFR BLD AUTO: 31.2 % (ref 42–52)
HCT VFR BLD AUTO: 31.6 % (ref 42–52)
HCT VFR BLD AUTO: 32.5 % (ref 42–52)
HCT VFR BLD AUTO: 38.1 % (ref 42–52)
HGB BLD-MCNC: 10.8 G/DL (ref 14–18)
HGB BLD-MCNC: 10.9 G/DL (ref 14–18)
HGB BLD-MCNC: 11.3 G/DL (ref 14–18)
HGB BLD-MCNC: 11.5 G/DL (ref 14–18)
HGB BLD-MCNC: 13.3 G/DL (ref 14–18)
IMM GRANULOCYTES # BLD AUTO: 0.09 K/UL (ref 0–0.11)
IMM GRANULOCYTES NFR BLD AUTO: 0.8 % (ref 0–0.9)
KETONES UR STRIP.AUTO-MCNC: ABNORMAL MG/DL
LEUKOCYTE ESTERASE UR QL STRIP.AUTO: NEGATIVE
LIPASE SERPL-CCNC: 19 U/L (ref 11–82)
LYMPHOCYTES # BLD AUTO: 1.95 K/UL (ref 1–4.8)
LYMPHOCYTES NFR BLD: 17.1 % (ref 22–41)
MCH RBC QN AUTO: 30.6 PG (ref 27–33)
MCHC RBC AUTO-ENTMCNC: 34.9 G/DL (ref 32.3–36.5)
MCV RBC AUTO: 87.8 FL (ref 81.4–97.8)
MICRO URNS: ABNORMAL
MONOCYTES # BLD AUTO: 0.89 K/UL (ref 0–0.85)
MONOCYTES NFR BLD AUTO: 7.8 % (ref 0–13.4)
NEUTROPHILS # BLD AUTO: 8.41 K/UL (ref 1.82–7.42)
NEUTROPHILS NFR BLD: 73.7 % (ref 44–72)
NITRITE UR QL STRIP.AUTO: NEGATIVE
NRBC # BLD AUTO: 0 K/UL
NRBC BLD-RTO: 0 /100 WBC (ref 0–0.2)
PH UR STRIP.AUTO: 6 [PH] (ref 5–8)
PLATELET # BLD AUTO: 211 K/UL (ref 164–446)
PMV BLD AUTO: 9 FL (ref 9–12.9)
POTASSIUM SERPL-SCNC: 3.8 MMOL/L (ref 3.6–5.5)
PROT SERPL-MCNC: 7 G/DL (ref 6–8.2)
PROT UR QL STRIP: NEGATIVE MG/DL
RBC # BLD AUTO: 4.34 M/UL (ref 4.7–6.1)
RBC UR QL AUTO: NEGATIVE
SODIUM SERPL-SCNC: 140 MMOL/L (ref 135–145)
SP GR UR STRIP.AUTO: >1.045
UROBILINOGEN UR STRIP.AUTO-MCNC: 0.2 EU/DL
WBC # BLD AUTO: 11.4 K/UL (ref 4.8–10.8)

## 2024-11-23 PROCEDURE — 83690 ASSAY OF LIPASE: CPT

## 2024-11-23 PROCEDURE — 80053 COMPREHEN METABOLIC PANEL: CPT

## 2024-11-23 PROCEDURE — 71045 X-RAY EXAM CHEST 1 VIEW: CPT

## 2024-11-23 PROCEDURE — 700102 HCHG RX REV CODE 250 W/ 637 OVERRIDE(OP): Performed by: STUDENT IN AN ORGANIZED HEALTH CARE EDUCATION/TRAINING PROGRAM

## 2024-11-23 PROCEDURE — A9270 NON-COVERED ITEM OR SERVICE: HCPCS | Performed by: HOSPITALIST

## 2024-11-23 PROCEDURE — 36415 COLL VENOUS BLD VENIPUNCTURE: CPT

## 2024-11-23 PROCEDURE — 700111 HCHG RX REV CODE 636 W/ 250 OVERRIDE (IP): Mod: JZ | Performed by: EMERGENCY MEDICINE

## 2024-11-23 PROCEDURE — 700111 HCHG RX REV CODE 636 W/ 250 OVERRIDE (IP): Performed by: STUDENT IN AN ORGANIZED HEALTH CARE EDUCATION/TRAINING PROGRAM

## 2024-11-23 PROCEDURE — 85018 HEMOGLOBIN: CPT | Mod: 91

## 2024-11-23 PROCEDURE — 74177 CT ABD & PELVIS W/CONTRAST: CPT

## 2024-11-23 PROCEDURE — A9270 NON-COVERED ITEM OR SERVICE: HCPCS | Performed by: SURGERY

## 2024-11-23 PROCEDURE — 700105 HCHG RX REV CODE 258: Performed by: STUDENT IN AN ORGANIZED HEALTH CARE EDUCATION/TRAINING PROGRAM

## 2024-11-23 PROCEDURE — 99223 1ST HOSP IP/OBS HIGH 75: CPT | Performed by: STUDENT IN AN ORGANIZED HEALTH CARE EDUCATION/TRAINING PROGRAM

## 2024-11-23 PROCEDURE — 85014 HEMATOCRIT: CPT | Mod: 91

## 2024-11-23 PROCEDURE — 770001 HCHG ROOM/CARE - MED/SURG/GYN PRIV*

## 2024-11-23 PROCEDURE — A9270 NON-COVERED ITEM OR SERVICE: HCPCS | Performed by: STUDENT IN AN ORGANIZED HEALTH CARE EDUCATION/TRAINING PROGRAM

## 2024-11-23 PROCEDURE — 700117 HCHG RX CONTRAST REV CODE 255: Performed by: EMERGENCY MEDICINE

## 2024-11-23 PROCEDURE — 85025 COMPLETE CBC W/AUTO DIFF WBC: CPT

## 2024-11-23 PROCEDURE — 99024 POSTOP FOLLOW-UP VISIT: CPT | Performed by: SURGERY

## 2024-11-23 PROCEDURE — 81003 URINALYSIS AUTO W/O SCOPE: CPT

## 2024-11-23 PROCEDURE — 700102 HCHG RX REV CODE 250 W/ 637 OVERRIDE(OP): Performed by: SURGERY

## 2024-11-23 PROCEDURE — 96374 THER/PROPH/DIAG INJ IV PUSH: CPT

## 2024-11-23 PROCEDURE — 96375 TX/PRO/DX INJ NEW DRUG ADDON: CPT

## 2024-11-23 PROCEDURE — 700102 HCHG RX REV CODE 250 W/ 637 OVERRIDE(OP): Performed by: HOSPITALIST

## 2024-11-23 RX ORDER — ONDANSETRON 4 MG/1
4 TABLET, ORALLY DISINTEGRATING ORAL EVERY 4 HOURS PRN
Status: DISCONTINUED | OUTPATIENT
Start: 2024-11-23 | End: 2024-11-25 | Stop reason: HOSPADM

## 2024-11-23 RX ORDER — METHOCARBAMOL 750 MG/1
750 TABLET, FILM COATED ORAL 4 TIMES DAILY
Status: DISCONTINUED | OUTPATIENT
Start: 2024-11-23 | End: 2024-11-25 | Stop reason: HOSPADM

## 2024-11-23 RX ORDER — PROCHLORPERAZINE EDISYLATE 5 MG/ML
5-10 INJECTION INTRAMUSCULAR; INTRAVENOUS EVERY 4 HOURS PRN
Status: DISCONTINUED | OUTPATIENT
Start: 2024-11-23 | End: 2024-11-25 | Stop reason: HOSPADM

## 2024-11-23 RX ORDER — PROMETHAZINE HYDROCHLORIDE 25 MG/1
12.5-25 TABLET ORAL EVERY 4 HOURS PRN
Status: DISCONTINUED | OUTPATIENT
Start: 2024-11-23 | End: 2024-11-25 | Stop reason: HOSPADM

## 2024-11-23 RX ORDER — OXYCODONE AND ACETAMINOPHEN 5; 325 MG/1; MG/1
1 TABLET ORAL EVERY 4 HOURS PRN
Status: DISCONTINUED | OUTPATIENT
Start: 2024-11-23 | End: 2024-11-23

## 2024-11-23 RX ORDER — HYDROMORPHONE HYDROCHLORIDE 1 MG/ML
0.25 INJECTION, SOLUTION INTRAMUSCULAR; INTRAVENOUS; SUBCUTANEOUS
Status: DISCONTINUED | OUTPATIENT
Start: 2024-11-23 | End: 2024-11-23

## 2024-11-23 RX ORDER — OXYCODONE HYDROCHLORIDE 5 MG/1
5 TABLET ORAL
Status: DISCONTINUED | OUTPATIENT
Start: 2024-11-23 | End: 2024-11-23

## 2024-11-23 RX ORDER — METHOCARBAMOL 500 MG/1
1000 TABLET, FILM COATED ORAL 3 TIMES DAILY PRN
Status: DISCONTINUED | OUTPATIENT
Start: 2024-11-23 | End: 2024-11-23

## 2024-11-23 RX ORDER — HYDROMORPHONE HYDROCHLORIDE 1 MG/ML
1 INJECTION, SOLUTION INTRAMUSCULAR; INTRAVENOUS; SUBCUTANEOUS ONCE
Status: COMPLETED | OUTPATIENT
Start: 2024-11-23 | End: 2024-11-23

## 2024-11-23 RX ORDER — ACETAMINOPHEN 325 MG/1
650 TABLET ORAL EVERY 6 HOURS PRN
Status: DISCONTINUED | OUTPATIENT
Start: 2024-11-23 | End: 2024-11-23

## 2024-11-23 RX ORDER — ACETAMINOPHEN 500 MG
1000 TABLET ORAL EVERY 6 HOURS
Status: DISCONTINUED | OUTPATIENT
Start: 2024-11-23 | End: 2024-11-25 | Stop reason: HOSPADM

## 2024-11-23 RX ORDER — SIMETHICONE 125 MG
125 TABLET,CHEWABLE ORAL 3 TIMES DAILY PRN
Status: DISCONTINUED | OUTPATIENT
Start: 2024-11-23 | End: 2024-11-25 | Stop reason: HOSPADM

## 2024-11-23 RX ORDER — OXYCODONE HYDROCHLORIDE 5 MG/1
2.5 TABLET ORAL
Status: DISCONTINUED | OUTPATIENT
Start: 2024-11-23 | End: 2024-11-23

## 2024-11-23 RX ORDER — HYDROMORPHONE HYDROCHLORIDE 1 MG/ML
1 INJECTION, SOLUTION INTRAMUSCULAR; INTRAVENOUS; SUBCUTANEOUS EVERY 4 HOURS PRN
Status: DISCONTINUED | OUTPATIENT
Start: 2024-11-23 | End: 2024-11-24

## 2024-11-23 RX ORDER — PROMETHAZINE HYDROCHLORIDE 25 MG/1
12.5-25 SUPPOSITORY RECTAL EVERY 4 HOURS PRN
Status: DISCONTINUED | OUTPATIENT
Start: 2024-11-23 | End: 2024-11-25 | Stop reason: HOSPADM

## 2024-11-23 RX ORDER — SODIUM CHLORIDE 9 MG/ML
INJECTION, SOLUTION INTRAVENOUS CONTINUOUS
Status: DISCONTINUED | OUTPATIENT
Start: 2024-11-23 | End: 2024-11-25

## 2024-11-23 RX ORDER — ONDANSETRON 2 MG/ML
4 INJECTION INTRAMUSCULAR; INTRAVENOUS EVERY 4 HOURS PRN
Status: DISCONTINUED | OUTPATIENT
Start: 2024-11-23 | End: 2024-11-25 | Stop reason: HOSPADM

## 2024-11-23 RX ORDER — SIMETHICONE 125 MG
125 TABLET,CHEWABLE ORAL EVERY 6 HOURS PRN
COMMUNITY

## 2024-11-23 RX ORDER — LABETALOL HYDROCHLORIDE 5 MG/ML
10 INJECTION, SOLUTION INTRAVENOUS EVERY 4 HOURS PRN
Status: DISCONTINUED | OUTPATIENT
Start: 2024-11-23 | End: 2024-11-25 | Stop reason: HOSPADM

## 2024-11-23 RX ADMIN — SIMETHICONE 125 MG: 125 TABLET, CHEWABLE ORAL at 19:37

## 2024-11-23 RX ADMIN — HYDROMORPHONE HYDROCHLORIDE 1 MG: 1 INJECTION, SOLUTION INTRAMUSCULAR; INTRAVENOUS; SUBCUTANEOUS at 03:21

## 2024-11-23 RX ADMIN — IOHEXOL 100 ML: 350 INJECTION, SOLUTION INTRAVENOUS at 00:27

## 2024-11-23 RX ADMIN — HYDROMORPHONE HYDROCHLORIDE 1 MG: 1 INJECTION, SOLUTION INTRAMUSCULAR; INTRAVENOUS; SUBCUTANEOUS at 01:14

## 2024-11-23 RX ADMIN — HYDROMORPHONE HYDROCHLORIDE 1 MG: 1 INJECTION, SOLUTION INTRAMUSCULAR; INTRAVENOUS; SUBCUTANEOUS at 20:48

## 2024-11-23 RX ADMIN — HYDROMORPHONE HYDROCHLORIDE 1 MG: 1 INJECTION, SOLUTION INTRAMUSCULAR; INTRAVENOUS; SUBCUTANEOUS at 16:27

## 2024-11-23 RX ADMIN — HYDROMORPHONE HYDROCHLORIDE 1 MG: 1 INJECTION, SOLUTION INTRAMUSCULAR; INTRAVENOUS; SUBCUTANEOUS at 12:08

## 2024-11-23 RX ADMIN — HYDROMORPHONE HYDROCHLORIDE 1 MG: 1 INJECTION, SOLUTION INTRAMUSCULAR; INTRAVENOUS; SUBCUTANEOUS at 07:34

## 2024-11-23 RX ADMIN — METHOCARBAMOL 1000 MG: 500 TABLET ORAL at 14:38

## 2024-11-23 RX ADMIN — LEVOTHYROXINE SODIUM 150 MCG: 0.12 TABLET ORAL at 05:32

## 2024-11-23 RX ADMIN — METHOCARBAMOL TABLETS 750 MG: 750 TABLET, COATED ORAL at 19:37

## 2024-11-23 RX ADMIN — SODIUM CHLORIDE: 9 INJECTION, SOLUTION INTRAVENOUS at 03:06

## 2024-11-23 RX ADMIN — METHOCARBAMOL TABLETS 750 MG: 750 TABLET, COATED ORAL at 17:52

## 2024-11-23 RX ADMIN — ACETAMINOPHEN 1000 MG: 500 TABLET ORAL at 17:52

## 2024-11-23 RX ADMIN — FENTANYL CITRATE 50 MCG: 50 INJECTION, SOLUTION INTRAMUSCULAR; INTRAVENOUS at 00:18

## 2024-11-23 RX ADMIN — ACETAMINOPHEN 650 MG: 325 TABLET ORAL at 11:47

## 2024-11-23 SDOH — ECONOMIC STABILITY: TRANSPORTATION INSECURITY
IN THE PAST 12 MONTHS, HAS LACK OF RELIABLE TRANSPORTATION KEPT YOU FROM MEDICAL APPOINTMENTS, MEETINGS, WORK OR FROM GETTING THINGS NEEDED FOR DAILY LIVING?: NO

## 2024-11-23 SDOH — ECONOMIC STABILITY: TRANSPORTATION INSECURITY
IN THE PAST 12 MONTHS, HAS THE LACK OF TRANSPORTATION KEPT YOU FROM MEDICAL APPOINTMENTS OR FROM GETTING MEDICATIONS?: NO

## 2024-11-23 ASSESSMENT — COGNITIVE AND FUNCTIONAL STATUS - GENERAL
DRESSING REGULAR UPPER BODY CLOTHING: A LITTLE
SUGGESTED CMS G CODE MODIFIER MOBILITY: CK
DRESSING REGULAR LOWER BODY CLOTHING: A LITTLE
STANDING UP FROM CHAIR USING ARMS: A LITTLE
TOILETING: A LITTLE
TURNING FROM BACK TO SIDE WHILE IN FLAT BAD: A LITTLE
SUGGESTED CMS G CODE MODIFIER DAILY ACTIVITY: CJ
MOVING TO AND FROM BED TO CHAIR: A LITTLE
WALKING IN HOSPITAL ROOM: A LITTLE
MOVING FROM LYING ON BACK TO SITTING ON SIDE OF FLAT BED: A LITTLE
DAILY ACTIVITIY SCORE: 20
MOBILITY SCORE: 18
HELP NEEDED FOR BATHING: A LITTLE
CLIMB 3 TO 5 STEPS WITH RAILING: A LITTLE

## 2024-11-23 ASSESSMENT — SOCIAL DETERMINANTS OF HEALTH (SDOH)
WITHIN THE LAST YEAR, HAVE TO BEEN RAPED OR FORCED TO HAVE ANY KIND OF SEXUAL ACTIVITY BY YOUR PARTNER OR EX-PARTNER?: NO
WITHIN THE PAST 12 MONTHS, YOU WORRIED THAT YOUR FOOD WOULD RUN OUT BEFORE YOU GOT THE MONEY TO BUY MORE: NEVER TRUE
WITHIN THE PAST 12 MONTHS, THE FOOD YOU BOUGHT JUST DIDN'T LAST AND YOU DIDN'T HAVE MONEY TO GET MORE: NEVER TRUE
WITHIN THE LAST YEAR, HAVE YOU BEEN KICKED, HIT, SLAPPED, OR OTHERWISE PHYSICALLY HURT BY YOUR PARTNER OR EX-PARTNER?: NO
WITHIN THE LAST YEAR, HAVE YOU BEEN AFRAID OF YOUR PARTNER OR EX-PARTNER?: NO
WITHIN THE LAST YEAR, HAVE YOU BEEN HUMILIATED OR EMOTIONALLY ABUSED IN OTHER WAYS BY YOUR PARTNER OR EX-PARTNER?: NO
IN THE PAST 12 MONTHS, HAS THE ELECTRIC, GAS, OIL, OR WATER COMPANY THREATENED TO SHUT OFF SERVICE IN YOUR HOME?: NO

## 2024-11-23 ASSESSMENT — PAIN DESCRIPTION - PAIN TYPE
TYPE: ACUTE PAIN

## 2024-11-23 ASSESSMENT — LIFESTYLE VARIABLES
ON A TYPICAL DAY WHEN YOU DRINK ALCOHOL HOW MANY DRINKS DO YOU HAVE: 1
ALCOHOL_USE: YES
HAVE YOU EVER FELT YOU SHOULD CUT DOWN ON YOUR DRINKING: NO
DOES PATIENT WANT TO STOP DRINKING: NO
EVER FELT BAD OR GUILTY ABOUT YOUR DRINKING: NO
HAVE PEOPLE ANNOYED YOU BY CRITICIZING YOUR DRINKING: NO
EVER HAD A DRINK FIRST THING IN THE MORNING TO STEADY YOUR NERVES TO GET RID OF A HANGOVER: NO
TOTAL SCORE: 0
CONSUMPTION TOTAL: NEGATIVE
AVERAGE NUMBER OF DAYS PER WEEK YOU HAVE A DRINK CONTAINING ALCOHOL: 2
TOTAL SCORE: 0
HOW MANY TIMES IN THE PAST YEAR HAVE YOU HAD 5 OR MORE DRINKS IN A DAY: 0
TOTAL SCORE: 0

## 2024-11-23 ASSESSMENT — ENCOUNTER SYMPTOMS
NEUROLOGICAL NEGATIVE: 1
NAUSEA: 0
ABDOMINAL PAIN: 1
PSYCHIATRIC NEGATIVE: 1
MUSCULOSKELETAL NEGATIVE: 1
RESPIRATORY NEGATIVE: 1
EYES NEGATIVE: 1
CARDIOVASCULAR NEGATIVE: 1

## 2024-11-23 ASSESSMENT — PATIENT HEALTH QUESTIONNAIRE - PHQ9
SUM OF ALL RESPONSES TO PHQ9 QUESTIONS 1 AND 2: 0
2. FEELING DOWN, DEPRESSED, IRRITABLE, OR HOPELESS: NOT AT ALL
1. LITTLE INTEREST OR PLEASURE IN DOING THINGS: NOT AT ALL

## 2024-11-23 ASSESSMENT — FIBROSIS 4 INDEX: FIB4 SCORE: 1.34

## 2024-11-23 NOTE — ED PROVIDER NOTES
ED Provider Note    CHIEF COMPLAINT  Chief Complaint   Patient presents with    Post-Op Complications       EXTERNAL RECORDS REVIEWED  Inpatient Notes inpatient surgical notes reviewed from Dr. moore    HPI/MERRICK  LIMITATION TO HISTORY   Select: : None  OUTSIDE HISTORIAN(S):  Family wife at bedside    Estrada Toussaint is a 64 y.o. male who presents to the Emergency Department with postoperative pain.  Yesterday he had bilateral robotic indirect inguinal hernia repair.  Was provided with oxycodone for pain control postoperatively.  While he has been following this regimen he is continue to have escalating pain now moderate to severe and uncontrollable.  Right greater than left.  Denies fever chills.  No shortness of breath.  No upper abdominal pain.    PAST MEDICAL HISTORY   has a past medical history of Arthritis, Bowel habit changes (2024), Cancer (HCC) (2024), Hypothyroid (2013), IBD (inflammatory bowel disease), Midline low back pain without sciatica (10/26/2020), Pain (2024), Ulcerative colitis (HCC), and Vitiligo (2013).    SURGICAL HISTORY   has a past surgical history that includes knee reconstruction (1988); shldr arthroscop,surg,w/rotat cuff repr (Right, 2024); shoulder surgery (2024); orif, knee (35 years ago); and repair hernia inguinal robot assisted, using dv5 (Bilateral, 2024).    FAMILY HISTORY  Family History   Problem Relation Age of Onset    Cancer Mother         Breast Cancer--metastatic    Heart Disease Father     Stroke Father     Stroke Brother     Stroke Sister         aneursym       SOCIAL HISTORY  Social History     Tobacco Use    Smoking status: Former     Current packs/day: 0.00     Average packs/day: 0.1 packs/day for 10.0 years (1.0 ttl pk-yrs)     Types: Cigarettes     Start date: 1976     Quit date: 1986     Years since quittin.9     Passive exposure: Never    Smokeless tobacco: Never   Vaping Use    Vaping  "status: Never Used   Substance and Sexual Activity    Alcohol use: Yes     Alcohol/week: 1.8 oz     Types: 2 Cans of beer, 1 Standard drinks or equivalent per week     Comment: 0-1 monthly    Drug use: Not Currently     Types: Oral     Comment: THC edibles occasionally for sleep    Sexual activity: Yes     Partners: Female     Birth control/protection: Post-Menopausal, Surgical     Comment: WIfe has had tubal ligation       CURRENT MEDICATIONS  Home Medications       Reviewed by Delia Giles (Pharmacy Tech) on 11/23/24 at 0144  Med List Status: Complete     Medication Last Dose Status   docusate sodium (COLACE) 100 MG Cap 11/22/2024 Active   levothyroxine (SYNTHROID) 150 MCG Tab 11/22/2024 Active   meloxicam (MOBIC) 15 MG tablet Not Taking Active   methocarbamol (ROBAXIN) 500 MG Tab 11/22/2024 Active   naproxen (NAPROSYN) 500 MG Tab 11/22/2024 Active   oxyCODONE-acetaminophen (PERCOCET) 5-325 MG Tab 11/22/2024 Active   polyethylene glycol/lytes (MIRALAX) Pack 11/21/2024 Active   simethicone (MYLICON) 125 MG chewable tablet 11/22/2024 Active                  Audit from Redirected Encounters    **Home medications have not yet been reviewed for this encounter**         ALLERGIES  Allergies   Allergen Reactions    Gluten Meal      GI issues with Gluten       PHYSICAL EXAM  VITAL SIGNS: /78   Pulse 85   Temp 36.6 °C (97.9 °F) (Temporal)   Resp 17   Ht 1.854 m (6' 1\")   Wt 74.6 kg (164 lb 7.4 oz)   SpO2 97%   BMI 21.70 kg/m²          Pulse ox interpretation: I interpret this pulse ox as normal.  Constitutional: Alert in no apparent distress.  HENT: No signs of trauma, Bilateral external ears normal, Nose normal.   Eyes: Pupils are equal and reactive  Neck: Normal range of motion, No tenderness, Supple  Cardiovascular: Regular rate and rhythm, no murmurs.   Thorax & Lungs: Normal breath sounds, No respiratory distress, No wheezing, No chest tenderness.   Abdomen: Bowel sounds normal, Soft, No " tenderness.  Surgical site well-appearing.  Dermabonded laparoscopic incisions noted  Skin: Warm, Dry, No erythema, No rash.  Musculoskeletal: Good range of motion in all major joints. No tenderness to palpation or major deformities noted.   Neurologic: Alert , Normal motor function, Normal sensory function, No focal deficits noted.   Psychiatric: Affect normal, Judgment normal, Mood normal.         EKG/LABS  Results for orders placed or performed during the hospital encounter of 11/22/24   CBC WITH DIFFERENTIAL    Collection Time: 11/23/24 12:14 AM   Result Value Ref Range    WBC 11.4 (H) 4.8 - 10.8 K/uL    RBC 4.34 (L) 4.70 - 6.10 M/uL    Hemoglobin 13.3 (L) 14.0 - 18.0 g/dL    Hematocrit 38.1 (L) 42.0 - 52.0 %    MCV 87.8 81.4 - 97.8 fL    MCH 30.6 27.0 - 33.0 pg    MCHC 34.9 32.3 - 36.5 g/dL    RDW 42.3 35.9 - 50.0 fL    Platelet Count 211 164 - 446 K/uL    MPV 9.0 9.0 - 12.9 fL    Neutrophils-Polys 73.70 (H) 44.00 - 72.00 %    Lymphocytes 17.10 (L) 22.00 - 41.00 %    Monocytes 7.80 0.00 - 13.40 %    Eosinophils 0.30 0.00 - 6.90 %    Basophils 0.30 0.00 - 1.80 %    Immature Granulocytes 0.80 0.00 - 0.90 %    Nucleated RBC 0.00 0.00 - 0.20 /100 WBC    Neutrophils (Absolute) 8.41 (H) 1.82 - 7.42 K/uL    Lymphs (Absolute) 1.95 1.00 - 4.80 K/uL    Monos (Absolute) 0.89 (H) 0.00 - 0.85 K/uL    Eos (Absolute) 0.03 0.00 - 0.51 K/uL    Baso (Absolute) 0.03 0.00 - 0.12 K/uL    Immature Granulocytes (abs) 0.09 0.00 - 0.11 K/uL    NRBC (Absolute) 0.00 K/uL   COMP METABOLIC PANEL    Collection Time: 11/23/24 12:14 AM   Result Value Ref Range    Sodium 140 135 - 145 mmol/L    Potassium 3.8 3.6 - 5.5 mmol/L    Chloride 100 96 - 112 mmol/L    Co2 22 20 - 33 mmol/L    Anion Gap 18.0 (H) 7.0 - 16.0    Glucose 115 (H) 65 - 99 mg/dL    Bun 15 8 - 22 mg/dL    Creatinine 0.98 0.50 - 1.40 mg/dL    Calcium 9.3 8.5 - 10.5 mg/dL    Correct Calcium 8.9 8.5 - 10.5 mg/dL    AST(SGOT) 18 12 - 45 U/L    ALT(SGPT) 16 2 - 50 U/L    Alkaline  Phosphatase 69 30 - 99 U/L    Total Bilirubin 1.0 0.1 - 1.5 mg/dL    Albumin 4.5 3.2 - 4.9 g/dL    Total Protein 7.0 6.0 - 8.2 g/dL    Globulin 2.5 1.9 - 3.5 g/dL    A-G Ratio 1.8 g/dL   LIPASE    Collection Time: 11/23/24 12:14 AM   Result Value Ref Range    Lipase 19 11 - 82 U/L   ESTIMATED GFR    Collection Time: 11/23/24 12:14 AM   Result Value Ref Range    GFR (CKD-EPI) 86 >60 mL/min/1.73 m 2   HEMOGLOBIN AND HEMATOCRIT    Collection Time: 11/23/24  3:48 AM   Result Value Ref Range    Hemoglobin 11.3 (L) 14.0 - 18.0 g/dL    Hematocrit 31.6 (L) 42.0 - 52.0 %   URINALYSIS    Collection Time: 11/23/24  9:46 AM    Specimen: Urine   Result Value Ref Range    Color Yellow     Character Clear     Specific Gravity >1.045 <1.035    Ph 6.0 5.0 - 8.0    Glucose Negative Negative mg/dL    Ketones Trace (A) Negative mg/dL    Protein Negative Negative mg/dL    Bilirubin Negative Negative    Urobilinogen, Urine 0.2 <=1.0 EU/dL    Nitrite Negative Negative    Leukocyte Esterase Negative Negative    Occult Blood Negative Negative    Micro Urine Req see below    HEMOGLOBIN AND HEMATOCRIT    Collection Time: 11/23/24 10:20 AM   Result Value Ref Range    Hemoglobin 10.8 (L) 14.0 - 18.0 g/dL    Hematocrit 31.2 (L) 42.0 - 52.0 %   HEMOGLOBIN AND HEMATOCRIT    Collection Time: 11/23/24  4:15 PM   Result Value Ref Range    Hemoglobin 11.5 (L) 14.0 - 18.0 g/dL    Hematocrit 32.5 (L) 42.0 - 52.0 %           RADIOLOGY/PROCEDURES   I have independently interpreted the diagnostic imaging associated with this visit and am waiting the final reading from the radiologist.   My preliminary interpretation is as follows: Chest x-ray without acute consolidative process    Radiologist interpretation:  DX-CHEST-PORTABLE (1 VIEW)   Final Result         1. No acute cardiopulmonary abnormalities are identified.      CT-ABDOMEN-PELVIS WITH   Final Result         1. A postsurgical hematoma measuring 7.5 x 6.8 x 7.6 cm extends from the right anterior  pelvis into the right lower quadrant of the abdomen. There is mass effect on the urinary bladder with displacement of the urinary bladder to the left.      2. Mild stranding in the bilateral retroperitoneum and presacral stranding as well as free intraperitoneal air could relate to recent surgery.          COURSE & MEDICAL DECISION MAKING    ASSESSMENT, COURSE AND PLAN  Care Narrative: 64-year-old presenting with postop location/pain.  Will complete labs, pain management and CT imaging to evaluate for potential postoperative complications    DISPOSITION AND DISCUSSIONS  I have discussed management of the patient with the following physicians and DON's: On-call surgeon for patient's surgeon from yesterday.  Recommend inpatient care for pain management and serial trending of H&H and hemodynamics; will follow-up in the morning.    Discussion of management with other Q or appropriate source(s): None       64-year-old male presented to the emergency department with above presentation.  Labs are reassuring.  CT imaging showing a roughly 7 x 7 x 7 cm hematoma.  I do believe this is the likely etiology the patient's pain complaint beyond what was otherwise expected postoperatively.  Again I have consult with the surgical team and the patient will be brought into the hospital service with surgery to continue on consultation.  Patient currently hemodynamic stable.  Will continue pain management while patient in ER.  Serial H&H can be completed once on floor.    FINAL DIAGNOSIS  1. Post-op pain    2. Hematoma of right inguinal region         Electronically signed by: Kumar Hernandez M.D., 11/22/2024 11:56 PM

## 2024-11-23 NOTE — ASSESSMENT & PLAN NOTE
Noted to have bilateral inguinal hernia repair about 2 days ago.  CT abdomen showing A postsurgical hematoma measuring 7.5 x 6.8 x 7.6 cm extends from the right anterior pelvis into the right lower quadrant of the abdomen. There is mass effect on the urinary bladder with displacement of the urinary bladder to the left.  Surgery following, his diet will be advanced as tolerated.  Will monitor hemoglobin medically.  Today noted to be 10.3.    --Patient noted to be intractable abdominal pain, multimodal pain management.  Including p.o., IV narcotic, gabapentin, celecoxib.

## 2024-11-23 NOTE — ED NOTES
Medication history reviewed with patient.  Med rec is complete  Allergies reviewed.   Patient denies any outpatient antibiotics in the last 30 days.   Anticoagulants taken in the last 14 days? None    Vandana Davis PhT

## 2024-11-23 NOTE — CARE PLAN
The patient is Stable - Low risk of patient condition declining or worsening    Shift Goals  Clinical Goals: pt will report a pain level of 4 or less within 12 hour shift  Patient Goals: pain control  Family Goals: gisselle    Progress made toward(s) clinical / shift goals:  Pt alert and able to make needs known. Call light and personal belongings in reach. Bed locked in lowest position. Pt given pain medication prn as ordered and pain was reassessed (see MAR). Nonpharmacological approaches offered for pain. All needs met at this time.     Problem: Pain - Standard  Goal: Alleviation of pain or a reduction in pain to the patient’s comfort goal  Outcome: Progressing     Problem: Knowledge Deficit - Standard  Goal: Patient and family/care givers will demonstrate understanding of plan of care, disease process/condition, diagnostic tests and medications  Outcome: Progressing       Patient is not progressing towards the following goals:

## 2024-11-23 NOTE — PROGRESS NOTES
4 Eyes Skin Assessment Completed by JERI Hilton and Terry,Susana    Head WDL  Ears WDL  Nose WDL  Mouth WDL  Neck WDL  Breast/Chest WDL  Shoulder Blades WDL, discoloration patches  Spine WDL  (R) Arm/Elbow/Hand Redness and Blanching  (L) Arm/Elbow/Hand Redness and Blanching  Abdomen Scar and Incision x3 lap site, open to air  Groin WDL  Scrotum/Coccyx/Buttocks WDL  (R) Leg WDL  (L) Leg WDL  (R) Heel/Foot/Toe WDL  (L) Heel/Foot/Toe WDL          Devices In Places Pulse Ox      Interventions In Place N/A    Possible Skin Injury No    Pictures Uploaded Into Epic N/A  Wound Consult Placed N/A  RN Wound Prevention Protocol Ordered No

## 2024-11-23 NOTE — ED TRIAGE NOTES
Estrada Toussaint  64 y.o. male    Chief Complaint   Patient presents with    Post-Op Complications     Pt states had bilateral hernia repair yesterday, states diffuse abd pain, mostly in lower abd, states feels like gas. States has been taking oxycodone and muscle relaxors, states has not got any relief. Denies issues going to bathroom.     Vitals:    11/22/24 2331   BP: (!) 170/118   Pulse: (!) 122   Resp: 18   Temp: 36.2 °C (97.2 °F)   SpO2: 100%       Triage process explained to patient, apologized for wait time, and returned to lobby.  Pt informed to notify staff of any change in condition.

## 2024-11-23 NOTE — PROGRESS NOTES
Bedside report received by JERI Ross  Assessment complete.  A&O x 4. Patient calls appropriately.  Patient ambulates with x1 assist.  Patient has 8/10 pain. Patient medicated per MAR for pain.  Denies N&V.   - void, - flatus, - BM. LBM PTA 11/21  Patient denies SOB.  SCD's off.  Patient wants to rest.  Review plan with of care with patient. Call light and personal belongings within reach. Hourly rounding in place. All needs met at this time.

## 2024-11-23 NOTE — H&P
Hospital Medicine History & Physical Note    Date of Service  11/23/2024    Primary Care Physician  Zhou Cardenas M.D.    Consultants  General Surgery    Code Status  Full Code    Chief Complaint  Chief Complaint   Patient presents with    Post-Op Complications       History of Presenting Illness  Estrada Toussaint is a 64 y.o. male who presented 11/22/2024 with abdominal pain.  About 48 hours ago, patient had a bilateral inguinal hernia repair and was discharged home.  Several hours after discharge, he began to notice a general abdominal pain, that is worse in his infraumbilical area bilaterally.  He reports that the pain is progressing and associated with chills.  He has not been able to eat or drink much due to the pain.  He decided to come back to the ER for further evaluation.    In the ER, found to have hemoglobin 13.3.  CT abdomen pelvis showing postsurgical hematoma measuring 7.5 x 6.8 x 7 point subcentimeter extending from the right anterior pelvis into the right lower quadrant of the abdomen.  Mass effect on the urinary bladder with displacement of the urinary bladder to the left.  Mild stranding in the bilateral paratonia and presacral stranding as well as free intraperitoneal air that could relate to recent surgery.  General surgery contacted, no acute events overnight.    I discussed the plan of care with patient.    Review of Systems  Review of Systems   Constitutional:  Positive for malaise/fatigue.   HENT: Negative.     Eyes: Negative.    Respiratory: Negative.     Cardiovascular: Negative.    Gastrointestinal:  Positive for abdominal pain.   Genitourinary: Negative.    Musculoskeletal: Negative.    Skin: Negative.    Neurological: Negative.    Endo/Heme/Allergies: Negative.    Psychiatric/Behavioral: Negative.         Past Medical History   has a past medical history of Arthritis, Bowel habit changes (11/01/2024), Cancer (HCC) (11/01/2024), Hypothyroid (05/16/2013), IBD (inflammatory bowel  disease), Midline low back pain without sciatica (10/26/2020), Pain (11/01/2024), Ulcerative colitis (HCC), and Vitiligo (05/16/2013).    Surgical History   has a past surgical history that includes knee reconstruction (01/01/1988); pr shldr arthroscop,surg,w/rotat cuff repr (Right, 09/30/2024); shoulder surgery (Sept 30 2024); orif, knee (35 years ago); and repair hernia inguinal robot assisted, using dv5 (Bilateral, 11/21/2024).     Family History  family history includes Cancer in his mother; Heart Disease in his father; Stroke in his brother, father, and sister.   Family history reviewed with patient. There is no family history that is pertinent to the chief complaint.     Social History   reports that he quit smoking about 38 years ago. His smoking use included cigarettes. He started smoking about 48 years ago. He has a 1 pack-year smoking history. He has never been exposed to tobacco smoke. He has never used smokeless tobacco. He reports current alcohol use of about 1.8 oz of alcohol per week. He reports that he does not currently use drugs after having used the following drugs: Oral.    Allergies  Allergies   Allergen Reactions    Gluten Meal      GI issues with Gluten       Medications  Prior to Admission Medications   Prescriptions Last Dose Informant Patient Reported? Taking?   docusate sodium (COLACE) 100 MG Cap 11/22/2024 at  3:00 PM Patient Yes Yes   Sig: Take 100 mg by mouth 2 times a day as needed for Constipation.      levothyroxine (SYNTHROID) 150 MCG Tab 11/22/2024 Morning Patient No Yes   Sig: Take 1 Tablet by mouth every morning on an empty stomach.   Patient taking differently: Take 150 mcg by mouth every morning on an empty stomach.      meloxicam (MOBIC) 15 MG tablet Not Taking Patient No No   Sig: Take 1 Tablet by mouth 1 time a day as needed for Moderate Pain.   Patient not taking: Reported on 11/23/2024   methocarbamol (ROBAXIN) 500 MG Tab 11/22/2024 at 10:40 PM Patient No Yes   Sig: Take 2  Tablets by mouth 3 times a day as needed (Abdominal or inguinal pain).   naproxen (NAPROSYN) 500 MG Tab 11/22/2024 at  2:00 PM Patient No Yes   Sig: Take 1 Tablet by mouth 2 times daily with meals as needed (Abdominal or inguinal pain) for up to 30 days.   oxyCODONE-acetaminophen (PERCOCET) 5-325 MG Tab 11/22/2024 at  9:30 PM Patient No Yes   Sig: Take 1 Tablet by mouth every four hours as needed for Moderate Pain or Severe Pain for up to 7 days.   polyethylene glycol/lytes (MIRALAX) Pack 11/21/2024 Noon Patient No No   Sig: Mix 1 Packet per package instructions and drink by mouth 1 time a day as needed (Constipation) for up to 14 days.   simethicone (MYLICON) 125 MG chewable tablet 11/22/2024 Evening Patient Yes Yes   Sig: Chew 125 mg every 6 hours as needed for Flatulence.      Facility-Administered Medications: None       Physical Exam  Temp:  [36.2 °C (97.2 °F)-36.7 °C (98.1 °F)] 36.7 °C (98.1 °F)  Pulse:  [] 91  Resp:  [18-26] 19  BP: (136-170)/() 136/88  SpO2:  [93 %-100 %] 93 %  Blood Pressure: 136/88   Temperature: 36.7 °C (98.1 °F)   Pulse: 91   Respiration: 19   Pulse Oximetry: 93 %       Physical Exam  Constitutional:       Appearance: Normal appearance. He is normal weight.   HENT:      Head: Normocephalic.      Nose: Nose normal.      Mouth/Throat:      Mouth: Mucous membranes are moist.   Eyes:      Extraocular Movements: Extraocular movements intact.      Conjunctiva/sclera: Conjunctivae normal.      Pupils: Pupils are equal, round, and reactive to light.   Cardiovascular:      Rate and Rhythm: Normal rate and regular rhythm.      Pulses: Normal pulses.   Pulmonary:      Effort: Pulmonary effort is normal.      Breath sounds: Normal breath sounds.   Abdominal:      General: Abdomen is flat. Bowel sounds are normal.      Palpations: Abdomen is soft.      Comments: Endorses pain upon palpation of his infraumbilical area   Musculoskeletal:         General: Normal range of motion.       "Cervical back: Neck supple.   Skin:     General: Skin is warm.   Neurological:      General: No focal deficit present.      Mental Status: He is alert and oriented to person, place, and time. Mental status is at baseline.   Psychiatric:         Mood and Affect: Mood normal.         Behavior: Behavior normal.         Thought Content: Thought content normal.         Judgment: Judgment normal.         Laboratory:  Recent Labs     11/23/24 0014   WBC 11.4*   RBC 4.34*   HEMOGLOBIN 13.3*   HEMATOCRIT 38.1*   MCV 87.8   MCH 30.6   MCHC 34.9   RDW 42.3   PLATELETCT 211   MPV 9.0     Recent Labs     11/23/24  0014   SODIUM 140   POTASSIUM 3.8   CHLORIDE 100   CO2 22   GLUCOSE 115*   BUN 15   CREATININE 0.98   CALCIUM 9.3     Recent Labs     11/23/24 0014   ALTSGPT 16   ASTSGOT 18   ALKPHOSPHAT 69   TBILIRUBIN 1.0   LIPASE 19   GLUCOSE 115*         No results for input(s): \"NTPROBNP\" in the last 72 hours.      No results for input(s): \"TROPONINT\" in the last 72 hours.    Imaging:  DX-CHEST-PORTABLE (1 VIEW)   Final Result         1. No acute cardiopulmonary abnormalities are identified.      CT-ABDOMEN-PELVIS WITH   Final Result         1. A postsurgical hematoma measuring 7.5 x 6.8 x 7.6 cm extends from the right anterior pelvis into the right lower quadrant of the abdomen. There is mass effect on the urinary bladder with displacement of the urinary bladder to the left.      2. Mild stranding in the bilateral retroperitoneum and presacral stranding as well as free intraperitoneal air could relate to recent surgery.          no X-Ray or EKG requiring interpretation    Assessment/Plan:  Justification for Admission Status  I anticipate this patient will require at least two midnights for appropriate medical management, necessitating inpatient admission because patient has hematoma in his abdomen    Patient will need a Med/Surg bed on SURGICAL service .  The need is secondary to hematoma.    * Hematoma  Assessment & " Plan  Noted to have bilateral inguinal hernia repair about 2 days ago.  CT abdomen showing A postsurgical hematoma measuring 7.5 x 6.8 x 7.6 cm extends from the right anterior pelvis into the right lower quadrant of the abdomen. There is mass effect on the urinary bladder with displacement of the urinary bladder to the left.  General surgery consulted, patient will be left n.p.o. for now.  Possible drainage in the morning  Patient is now anemic, likely from surgery and hematoma, trend hemoglobin every 6 hours  Fluids  Pain medications  Avoid aspirin or anticoagulant antiplatelet medication.          Prostate cancer (HCC)- (present on admission)  Assessment & Plan  Not on treatment at this time    Hypothyroidism  Assessment & Plan  Synthroid     Ulcerative colitis (HCC)- (present on admission)  Assessment & Plan  Following with GI outpatient.  Not on treatment at this time        VTE prophylaxis: pharmacologic prophylaxis contraindicated due to hematoma

## 2024-11-23 NOTE — PROGRESS NOTES
This 64-year-old male with a past medical significant for prostate cancer being followed by  [facility] urology as an outpatient, had bilateral inguinal hernia repair on 11/21/2022 presented to the ER on 11/22/2022 with complaint of intractable abdominal pain.      After patient was discharged home, he was noted to have severe abdominal pain associated with chills, diabetic p.o. intake because of the pain the stated to come to ER for evaluation.    Presentation in ER he is hemoglobin noted to be 13.3 CT abdomen howing postsurgical hematoma measuring 7.5 x 6.8 x 7 point subcentimeter extending from the right anterior pelvis into the right lower quadrant of the abdomen. Mass effect on the urinary bladder with displacement of the urinary bladder to the left.  Surgical oncology was consulted; and stated that  this is  contained within the pre-peritoneal space, which should allow for tamponade. No surgical intervention. No indication for drainage given risk of infecting the mesh.     Will monitor H&H  Pain management   Will  follow surgical onc recs

## 2024-11-23 NOTE — CONSULTS
Surgical Oncology Consult History and Physical    Reason for Consult: Hospitalist    Consulted By: Theresa  Location: [id number]    HPI: Patient is a 65 yo man POD 2 from robotic bilateral inguinal hernia repair, now presenting with contained hematoma on the left side. Patient reports ongoing pain L > R, not controlled by oral pain medications. Difficulty eating and ambulating due to pain.     Body mass index is 21.7 kg/m².        Past Medical History:          Past Medical History:   Diagnosis Date    Arthritis     Bowel habit changes 11/01/2024    chronic constipation    Cancer (HCC) 11/01/2024    prostate cancer being monitored, no surgery    Hypothyroid 05/16/2013    IBD (inflammatory bowel disease)     Midline low back pain without sciatica 10/26/2020    Pain 11/01/2024    right shoulder pain and bilateral groin pain    Ulcerative colitis (HCC)     manged by diet over the past 30 years    Vitiligo 05/16/2013       Past Surgical History:        Past Surgical History:   Procedure Laterality Date    REPAIR HERNIA INGUINAL ROBOT ASSISTED, USING DV5 Bilateral 11/21/2024    Procedure: REPAIR BILATERAL HERNIA INGUINAL ROBOT ASSISTED, USING DV5;  Surgeon: Raymond Lee M.D.;  Location: SURGERY [facility];  Service: Gen Robotic     SHLDR ARTHROSCOP,SURG,W/ROTAT CUFF REPB Right 09/30/2024    Procedure: RIGHT SHOULDER ARTHROSCOPY, POSSIBLE RIGHT ROTATOR CUFF REPAIR, RIGHT SUBACROMIAL DECOMPRESSION, RIGHT LABRAL DEBRIDEMENT, RIGHT OPEN BICEPS TENODESIS, REPAIRS AS INDICATED;  Surgeon: Rashad Glynn M.D.;  Location: [facility];  Service: Orthopedics    KNEE RECONSTRUCTION  01/01/1988    Right knee   Tosin  ACL replacement     ORIF, KNEE  35 years ago    Right Knee Rebuild    SHOULDER SURGERY  Sept 30 2024       Current Medications:            -Anticoagulation:  none   Last dose:  N/A    Allergies:         Allergies   Allergen Reactions    Gluten Meal      GI issues with Gluten       Family  History:          Family History   Problem Relation Age of Onset    Cancer Mother         Breast Cancer--metastatic    Heart Disease Father     Stroke Father     Stroke Brother     Stroke Sister         aneursym       Social History:           Social History     Socioeconomic History    Marital status:      Spouse name: Not on file    Number of children: Not on file    Years of education: Not on file    Highest education level: Not on file   Occupational History    Not on file   Tobacco Use    Smoking status: Former     Current packs/day: 0.00     Average packs/day: 0.1 packs/day for 10.0 years (1.0 ttl pk-yrs)     Types: Cigarettes     Start date: 1976     Quit date: 1986     Years since quittin.9     Passive exposure: Never    Smokeless tobacco: Never   Vaping Use    Vaping status: Never Used   Substance and Sexual Activity    Alcohol use: Yes     Alcohol/week: 1.8 oz     Types: 2 Cans of beer, 1 Standard drinks or equivalent per week     Comment: 0-1 monthly    Drug use: Not Currently     Types: Oral     Comment: THC edibles occasionally for sleep    Sexual activity: Yes     Partners: Female     Birth control/protection: Post-Menopausal, Surgical     Comment: WIfe has had tubal ligation   Other Topics Concern    Not on file   Social History Narrative    Not on file     Social Drivers of Health     Financial Resource Strain: Not on file   Food Insecurity: No Food Insecurity (2024)    Hunger Vital Sign     Worried About Running Out of Food in the Last Year: Never true     Ran Out of Food in the Last Year: Never true   Transportation Needs: No Transportation Needs (2024)    PRAPARE - Transportation     Lack of Transportation (Medical): No     Lack of Transportation (Non-Medical): No   Physical Activity: Not on file   Stress: Not on file   Social Connections: Not on file   Intimate Partner Violence: Not At Risk (2024)    Humiliation, Afraid, Rape, and Kick questionnaire     Fear  "of Current or Ex-Partner: No     Emotionally Abused: No     Physically Abused: No     Sexually Abused: No   Housing Stability: Low Risk  (11/23/2024)    Housing Stability Vital Sign     Unable to Pay for Housing in the Last Year: No     Number of Times Moved in the Last Year: 0     Homeless in the Last Year: No       Review of Systems:  Review of Systems   Gastrointestinal:  Positive for abdominal pain. Negative for nausea.   Genitourinary:  Positive for urgency. Negative for dysuria.   All other systems reviewed and are negative.      All other ROS negative.      Physical Exam:  /82   Pulse 73   Temp 36.2 °C (97.1 °F) (Temporal)   Resp 19   Ht 1.854 m (6' 1\")   Wt 74.6 kg (164 lb 7.4 oz)   SpO2 99%   BMI 21.70 kg/m²       -General: Patient is cooperative, appears stated age, in no acute distress, AAOx3     -HEENT:  Head is atraumatic, neck supple, no scleral icterus     -Neck: trachea is midline     -Respiratory: Lungs clear to auscultation bilaterally, no increased work of breathing, stable on room air       -Cardiovascular: normal rate, regular, no murmur appreciated     -Abdomen: soft, incisions healing, tender over bilateral inguinal canals, L > R    -: Deferred     -MSK/Extremities: atraumatic, normal range of motion and strength, ambulatory     -Integumentary: warm, dry, no obvious skin lesions or rashes appreciated     -Neurologic: alert, speech clear and coherent, functional cognition intact     -Psychiatric:  cooperative, appropriate mood and affect        Imaging: CT A/P 11/22:   IMPRESSION:        1. A postsurgical hematoma measuring 7.5 x 6.8 x 7.6 cm extends from the right anterior pelvis into the right lower quadrant of the abdomen. There is mass effect on the urinary bladder with displacement of the urinary bladder to the left.     2. Mild stranding in the bilateral retroperitoneum and presacral stranding as well as free intraperitoneal air could relate to recent surgery.        Labs: "   Hgb 13.3 -> 11.3 after hydration     Assessment and Plan:  Patient is a 64 year old man POD 2 from bilateral inguinal hernia repair, now presenting with left post-op hematoma. This hematoma is contained within the pre-peritoneal space, which should allow for tamponade. No surgical intervention. No indication for drainage given risk of infecting the mesh.     Continue to trend CBC and hold anticoagulation  Optimize pain control with multi-modal medications  Will continue to follow    -The patient asked several great questions which were answered to their satisfaction.  They are in agreement with the care plan as outlined above.  -Primary team updated on plan of care.      Coretta Ruelas MD, MS  Surgical Oncology  November 23, 2024, 9:43 AM

## 2024-11-23 NOTE — PROGRESS NOTES
Assumed care of pt from NOC RN. Pt alert and able to make needs known. Pt c/o 10/10, medication given as ordered. Pt has tense body language and tachypnea  0815 Reassessed pt, reports 6/10 pain. RR 18, no signs of distress

## 2024-11-24 PROBLEM — Z71.89 ADVANCE CARE PLANNING: Status: ACTIVE | Noted: 2024-11-24

## 2024-11-24 LAB
ANION GAP SERPL CALC-SCNC: 10 MMOL/L (ref 7–16)
BUN SERPL-MCNC: 10 MG/DL (ref 8–22)
CALCIUM SERPL-MCNC: 8 MG/DL (ref 8.5–10.5)
CHLORIDE SERPL-SCNC: 103 MMOL/L (ref 96–112)
CO2 SERPL-SCNC: 25 MMOL/L (ref 20–33)
CREAT SERPL-MCNC: 0.73 MG/DL (ref 0.5–1.4)
GFR SERPLBLD CREATININE-BSD FMLA CKD-EPI: 102 ML/MIN/1.73 M 2
GLUCOSE SERPL-MCNC: 102 MG/DL (ref 65–99)
HCT VFR BLD AUTO: 29.6 % (ref 42–52)
HGB BLD-MCNC: 10.3 G/DL (ref 14–18)
POTASSIUM SERPL-SCNC: 3.5 MMOL/L (ref 3.6–5.5)
SODIUM SERPL-SCNC: 138 MMOL/L (ref 135–145)

## 2024-11-24 PROCEDURE — A9270 NON-COVERED ITEM OR SERVICE: HCPCS | Performed by: SURGERY

## 2024-11-24 PROCEDURE — 700111 HCHG RX REV CODE 636 W/ 250 OVERRIDE (IP): Performed by: STUDENT IN AN ORGANIZED HEALTH CARE EDUCATION/TRAINING PROGRAM

## 2024-11-24 PROCEDURE — 700102 HCHG RX REV CODE 250 W/ 637 OVERRIDE(OP): Performed by: STUDENT IN AN ORGANIZED HEALTH CARE EDUCATION/TRAINING PROGRAM

## 2024-11-24 PROCEDURE — 770001 HCHG ROOM/CARE - MED/SURG/GYN PRIV*

## 2024-11-24 PROCEDURE — 700102 HCHG RX REV CODE 250 W/ 637 OVERRIDE(OP): Performed by: SURGERY

## 2024-11-24 PROCEDURE — 99024 POSTOP FOLLOW-UP VISIT: CPT | Performed by: SURGERY

## 2024-11-24 PROCEDURE — 700111 HCHG RX REV CODE 636 W/ 250 OVERRIDE (IP): Performed by: SURGERY

## 2024-11-24 PROCEDURE — 700105 HCHG RX REV CODE 258: Performed by: SURGERY

## 2024-11-24 PROCEDURE — 85014 HEMATOCRIT: CPT

## 2024-11-24 PROCEDURE — 99233 SBSQ HOSP IP/OBS HIGH 50: CPT | Mod: 25 | Performed by: HOSPITALIST

## 2024-11-24 PROCEDURE — 85018 HEMOGLOBIN: CPT

## 2024-11-24 PROCEDURE — A9270 NON-COVERED ITEM OR SERVICE: HCPCS | Performed by: STUDENT IN AN ORGANIZED HEALTH CARE EDUCATION/TRAINING PROGRAM

## 2024-11-24 PROCEDURE — 99497 ADVNCD CARE PLAN 30 MIN: CPT | Performed by: HOSPITALIST

## 2024-11-24 PROCEDURE — A9270 NON-COVERED ITEM OR SERVICE: HCPCS | Mod: JZ | Performed by: HOSPITALIST

## 2024-11-24 PROCEDURE — 80048 BASIC METABOLIC PNL TOTAL CA: CPT

## 2024-11-24 PROCEDURE — 700102 HCHG RX REV CODE 250 W/ 637 OVERRIDE(OP): Mod: JZ | Performed by: HOSPITALIST

## 2024-11-24 RX ORDER — AMOXICILLIN 250 MG
1 CAPSULE ORAL 2 TIMES DAILY
Status: DISCONTINUED | OUTPATIENT
Start: 2024-11-24 | End: 2024-11-25 | Stop reason: HOSPADM

## 2024-11-24 RX ORDER — BISACODYL 10 MG
10 SUPPOSITORY, RECTAL RECTAL ONCE
Status: ACTIVE | OUTPATIENT
Start: 2024-11-24 | End: 2024-11-25

## 2024-11-24 RX ORDER — OXYCODONE HYDROCHLORIDE 10 MG/1
10 TABLET ORAL EVERY 4 HOURS PRN
Status: DISCONTINUED | OUTPATIENT
Start: 2024-11-24 | End: 2024-11-25 | Stop reason: HOSPADM

## 2024-11-24 RX ORDER — CELECOXIB 100 MG/1
100 CAPSULE ORAL 2 TIMES DAILY
Status: DISCONTINUED | OUTPATIENT
Start: 2024-11-24 | End: 2024-11-25

## 2024-11-24 RX ORDER — OXYCODONE HYDROCHLORIDE 5 MG/1
5 TABLET ORAL EVERY 4 HOURS PRN
Status: DISCONTINUED | OUTPATIENT
Start: 2024-11-24 | End: 2024-11-24

## 2024-11-24 RX ORDER — POLYETHYLENE GLYCOL 3350 17 G/17G
1 POWDER, FOR SOLUTION ORAL DAILY
Status: DISCONTINUED | OUTPATIENT
Start: 2024-11-24 | End: 2024-11-25 | Stop reason: HOSPADM

## 2024-11-24 RX ORDER — HYDROMORPHONE HYDROCHLORIDE 1 MG/ML
1 INJECTION, SOLUTION INTRAMUSCULAR; INTRAVENOUS; SUBCUTANEOUS EVERY 4 HOURS PRN
Status: DISCONTINUED | OUTPATIENT
Start: 2024-11-24 | End: 2024-11-25 | Stop reason: HOSPADM

## 2024-11-24 RX ORDER — GABAPENTIN 100 MG/1
100 CAPSULE ORAL 3 TIMES DAILY
Status: DISCONTINUED | OUTPATIENT
Start: 2024-11-24 | End: 2024-11-25

## 2024-11-24 RX ORDER — POTASSIUM CHLORIDE 1500 MG/1
40 TABLET, EXTENDED RELEASE ORAL ONCE
Status: COMPLETED | OUTPATIENT
Start: 2024-11-24 | End: 2024-11-24

## 2024-11-24 RX ADMIN — METHOCARBAMOL TABLETS 750 MG: 750 TABLET, COATED ORAL at 13:02

## 2024-11-24 RX ADMIN — POLYETHYLENE GLYCOL 3350 1 PACKET: 17 POWDER, FOR SOLUTION ORAL at 11:59

## 2024-11-24 RX ADMIN — SIMETHICONE 125 MG: 125 TABLET, CHEWABLE ORAL at 02:43

## 2024-11-24 RX ADMIN — HYDROMORPHONE HYDROCHLORIDE 1 MG: 1 INJECTION, SOLUTION INTRAMUSCULAR; INTRAVENOUS; SUBCUTANEOUS at 02:36

## 2024-11-24 RX ADMIN — POTASSIUM CHLORIDE 40 MEQ: 1500 TABLET, EXTENDED RELEASE ORAL at 08:19

## 2024-11-24 RX ADMIN — SENNOSIDES AND DOCUSATE SODIUM 1 TABLET: 50; 8.6 TABLET ORAL at 17:25

## 2024-11-24 RX ADMIN — ACETAMINOPHEN 1000 MG: 500 TABLET ORAL at 23:34

## 2024-11-24 RX ADMIN — LEVOTHYROXINE SODIUM 150 MCG: 0.12 TABLET ORAL at 04:27

## 2024-11-24 RX ADMIN — SENNOSIDES AND DOCUSATE SODIUM 1 TABLET: 50; 8.6 TABLET ORAL at 10:31

## 2024-11-24 RX ADMIN — GABAPENTIN 100 MG: 100 CAPSULE ORAL at 11:43

## 2024-11-24 RX ADMIN — METHOCARBAMOL TABLETS 750 MG: 750 TABLET, COATED ORAL at 20:35

## 2024-11-24 RX ADMIN — OXYCODONE HYDROCHLORIDE 10 MG: 10 TABLET ORAL at 16:15

## 2024-11-24 RX ADMIN — GABAPENTIN 100 MG: 100 CAPSULE ORAL at 17:25

## 2024-11-24 RX ADMIN — HYDROMORPHONE HYDROCHLORIDE 1 MG: 1 INJECTION, SOLUTION INTRAMUSCULAR; INTRAVENOUS; SUBCUTANEOUS at 17:26

## 2024-11-24 RX ADMIN — ACETAMINOPHEN 1000 MG: 500 TABLET ORAL at 04:27

## 2024-11-24 RX ADMIN — METHOCARBAMOL TABLETS 750 MG: 750 TABLET, COATED ORAL at 08:19

## 2024-11-24 RX ADMIN — HYDROMORPHONE HYDROCHLORIDE 1 MG: 1 INJECTION, SOLUTION INTRAMUSCULAR; INTRAVENOUS; SUBCUTANEOUS at 11:43

## 2024-11-24 RX ADMIN — OXYCODONE HYDROCHLORIDE 10 MG: 10 TABLET ORAL at 20:35

## 2024-11-24 RX ADMIN — SODIUM CHLORIDE: 9 INJECTION, SOLUTION INTRAVENOUS at 13:03

## 2024-11-24 RX ADMIN — SIMETHICONE 125 MG: 125 TABLET, CHEWABLE ORAL at 19:21

## 2024-11-24 RX ADMIN — CELECOXIB 100 MG: 100 CAPSULE ORAL at 10:31

## 2024-11-24 RX ADMIN — ACETAMINOPHEN 1000 MG: 500 TABLET ORAL at 17:25

## 2024-11-24 RX ADMIN — HYDROMORPHONE HYDROCHLORIDE 1 MG: 1 INJECTION, SOLUTION INTRAMUSCULAR; INTRAVENOUS; SUBCUTANEOUS at 07:23

## 2024-11-24 RX ADMIN — OXYCODONE 5 MG: 5 TABLET ORAL at 10:31

## 2024-11-24 RX ADMIN — HYDROMORPHONE HYDROCHLORIDE 1 MG: 1 INJECTION, SOLUTION INTRAMUSCULAR; INTRAVENOUS; SUBCUTANEOUS at 23:56

## 2024-11-24 RX ADMIN — METHOCARBAMOL TABLETS 750 MG: 750 TABLET, COATED ORAL at 17:25

## 2024-11-24 RX ADMIN — ACETAMINOPHEN 1000 MG: 500 TABLET ORAL at 11:43

## 2024-11-24 ASSESSMENT — ENCOUNTER SYMPTOMS
ABDOMINAL PAIN: 1
NAUSEA: 1
COUGH: 0
NERVOUS/ANXIOUS: 1
MYALGIAS: 1
DIZZINESS: 0
BLURRED VISION: 0
PALPITATIONS: 0
CONSTIPATION: 1

## 2024-11-24 ASSESSMENT — PAIN DESCRIPTION - PAIN TYPE
TYPE: ACUTE PAIN

## 2024-11-24 NOTE — PROGRESS NOTES
Date of Service  November 24, 2024     Chief Complaint  Post-Op Complications        Surgery Completed  Robotic bilateral inguinal hernia repair 11/21 ([facility])    Hospital Course  HD # 2     Interval Problem Update  Hgb stable  Pain borderline controlled  Has not ambulated  Tolerating small volume of food due to pain, no BM or flatus    Problem List  Principal Problem:    Hematoma (POA: Unknown)  Active Problems:    Ulcerative colitis (HCC) (Chronic) (POA: Yes)      Overview: FB DHA. Under control . Pt asympt      Last colonoscopy feb 2024>> polyps noted. GI rec to repeat feb 2026                Hypothyroidism (POA: Unknown)      Overview: Dr. Trevino      Synthroid (did not tolerate generic)    Prostate cancer (HCC) (Chronic) (POA: Yes)      Overview: Fb URO. NO RX  at this time . Cont monitor w uro            8.2024        Resolved Problems:    Postoperative hematoma of both eyes complicating ophthalmic procedure (POA: Yes)       Subjective  Review of Systems   Gastrointestinal:  Positive for abdominal pain.   All other systems reviewed and are negative.        Objective  Temp:  [36.2 °C (97.1 °F)-37.1 °C (98.8 °F)] 37.1 °C (98.8 °F)  Pulse:  [69-90] 83  Resp:  [17-18] 18  BP: (114-139)/(66-79) 114/66  SpO2:  [92 %-97 %] 97 %      Physical Exam  Vitals and nursing note reviewed.   Constitutional:       Appearance: Normal appearance. He is normal weight.   HENT:      Head: Normocephalic and atraumatic.   Eyes:      Extraocular Movements: Extraocular movements intact.   Cardiovascular:      Rate and Rhythm: Normal rate and regular rhythm.   Pulmonary:      Effort: Pulmonary effort is normal.   Abdominal:      General: Abdomen is flat.      Palpations: Abdomen is soft.      Tenderness: There is abdominal tenderness.      Comments: No ecchymoses or edema   Musculoskeletal:         General: Normal range of motion.   Skin:     General: Skin is dry.   Neurological:      General: No focal deficit present.       "Mental Status: He is alert and oriented to person, place, and time.          Fluids    Intake/Output Summary (Last 24 hours) at 11/24/2024 0920  Last data filed at 11/24/2024 0816  Gross per 24 hour   Intake 240 ml   Output 750 ml   Net -510 ml         Labs  Lab Results   Component Value Date/Time    SODIUM 138 11/24/2024 03:27 AM    POTASSIUM 3.5 (L) 11/24/2024 03:27 AM    CHLORIDE 103 11/24/2024 03:27 AM    CO2 25 11/24/2024 03:27 AM    GLUCOSE 102 (H) 11/24/2024 03:27 AM    BUN 10 11/24/2024 03:27 AM    CREATININE 0.73 11/24/2024 03:27 AM         No results found for: \"PROTHROMBTM\", \"INR\"      Lab Results   Component Value Date/Time    WBC 11.4 (H) 11/23/2024 12:14 AM    RBC 4.34 (L) 11/23/2024 12:14 AM    HEMOGLOBIN 10.3 (L) 11/24/2024 03:27 AM    HEMATOCRIT 29.6 (L) 11/24/2024 03:27 AM    MCV 87.8 11/23/2024 12:14 AM    MCH 30.6 11/23/2024 12:14 AM    MCHC 34.9 11/23/2024 12:14 AM    MPV 9.0 11/23/2024 12:14 AM    NEUTSPOLYS 73.70 (H) 11/23/2024 12:14 AM    LYMPHOCYTES 17.10 (L) 11/23/2024 12:14 AM    MONOCYTES 7.80 11/23/2024 12:14 AM    EOSINOPHILS 0.30 11/23/2024 12:14 AM    BASOPHILS 0.30 11/23/2024 12:14 AM    HYPOCHROMIA 1+ 10/05/2013 10:50 PM         Recent Labs     11/23/24  0014   ASTSGOT 18   ALTSGPT 16   TBILIRUBIN 1.0   GLOBULIN 2.5           Assessment/Plan  Patient is a 65yo man s/p bilateral robotic inguinal hernia repair presenting with post-operative hematoma contained within the pre-peritoneal space.     Pain - borderline controlled. MMPC: Tylenol, celebrex, robaxin, gabapentin, oxycodone. Dilaudid for breakthrough.   Diet - regular diet, increase bowel care  Fluids TKO  Lines IV  DC planning      Coretta Ruelas MD, MS  Surgical Oncology  [facility], [facility]             "

## 2024-11-24 NOTE — PROGRESS NOTES
[facility] Medicine Daily Progress Note    Date of Service  11/24/2024    Chief Complaint  Estrada Toussaint is a 64 y.o. male admitted 11/22/2024 with   Chief Complaint   Patient presents with    Post-Op Complications         Hospital Course  This 64-year-old male with a past medical significant for prostate cancer being followed by  [facility] urology as an outpatient, had bilateral inguinal hernia repair on 11/21/2022 presented to the ER on 11/22/2022 with complaint of intractable abdominal pain.      After patient was discharged home, he was noted to have severe abdominal pain associated with chills, diabetic p.o. intake because of the pain the stated to come to ER for evaluation.    Presentation in ER he is hemoglobin noted to be 13.3 CT abdomen howing postsurgical hematoma measuring 7.5 x 6.8 x 7 point subcentimeter extending from the right anterior pelvis into the right lower quadrant of the abdomen. Mass effect on the urinary bladder with displacement of the urinary bladder to the left.  Surgical oncology was consulted; and stated that  this is  contained within the pre-peritoneal space, which should allow for tamponade. No surgical intervention. No indication for drainage given risk of infecting the mesh.     Interval events:  -- Patient is alert, awake, symptoms appropriate, vitals and has been reviewed, heart rate 76-83, blood pressure 114/66, satting 97% on room air.  Patient states that his pain is intractable.  He he was started on multimodal pain management, currently on hydromorphone, gabapentin, celecoxib, oxycodone.  -- Patient had IV and p.o. narcotic, will monitor his respiratory status understands  -- Patient will need continuous pulse ox  -- Aggressive bowel regimen advised the patient to walk in hallway multiple times without nursing assistance.  -- Also patient is to have a bowel movement, but he will need outpatient bowel regimen especially when he has not decided.  The patient    Hemoglobin  noted to be 10.3.  Surgery continue to follow, recommended daily CBC.    I have discussed this patient's plan of care and discharge plan at IDT rounds today with Case Management, Nursing, Nursing leadership, and other members of the IDT team.    Consultants/Specialty  Surgical oncology    Code Status  Full Code    Disposition  The patient is not medically cleared for discharge to home or a post-acute facility.  Anticipate discharge to: home with close outpatient follow-up    I have placed the appropriate orders for post-discharge needs.    Review of Systems  Review of Systems   Constitutional:  Positive for malaise/fatigue.   Eyes:  Negative for blurred vision.   Respiratory:  Negative for cough.    Cardiovascular:  Negative for chest pain and palpitations.   Gastrointestinal:  Positive for abdominal pain, constipation and nausea.   Genitourinary:  Negative for dysuria.   Musculoskeletal:  Positive for myalgias.   Neurological:  Negative for dizziness.   Psychiatric/Behavioral:  The patient is nervous/anxious.         Physical Exam  Temp:  [36.2 °C (97.1 °F)-37.1 °C (98.8 °F)] 37.1 °C (98.8 °F)  Pulse:  [69-90] 83  Resp:  [17-18] 18  BP: (114-139)/(66-79) 114/66  SpO2:  [92 %-97 %] 97 %    Physical Exam  Vitals and nursing note reviewed.   Constitutional:       Appearance: Normal appearance.   HENT:      Head: Normocephalic and atraumatic.   Eyes:      Extraocular Movements: Extraocular movements intact.   Cardiovascular:      Rate and Rhythm: Normal rate.   Pulmonary:      Effort: No respiratory distress.      Breath sounds: Normal breath sounds.   Abdominal:      General: There is no distension.      Palpations: Abdomen is soft.   Musculoskeletal:      Cervical back: Neck supple.      Right lower leg: No edema.      Left lower leg: No edema.   Skin:     General: Skin is warm.   Neurological:      Mental Status: He is alert and oriented to person, place, and time.      Cranial Nerves: No cranial nerve deficit.          Fluids    Intake/Output Summary (Last 24 hours) at 11/24/2024 1127  Last data filed at 11/24/2024 1000  Gross per 24 hour   Intake 240 ml   Output 751 ml   Net -511 ml        Laboratory  Recent Labs     11/23/24  0014 11/23/24  0348 11/23/24  1615 11/23/24  2222 11/24/24  0327   WBC 11.4*  --   --   --   --    RBC 4.34*  --   --   --   --    HEMOGLOBIN 13.3*   < > 11.5* 10.9* 10.3*   HEMATOCRIT 38.1*   < > 32.5* 30.3* 29.6*   MCV 87.8  --   --   --   --    MCH 30.6  --   --   --   --    MCHC 34.9  --   --   --   --    RDW 42.3  --   --   --   --    PLATELETCT 211  --   --   --   --    MPV 9.0  --   --   --   --     < > = values in this interval not displayed.     Recent Labs     11/23/24  0014 11/24/24  0327   SODIUM 140 138   POTASSIUM 3.8 3.5*   CHLORIDE 100 103   CO2 22 25   GLUCOSE 115* 102*   BUN 15 10   CREATININE 0.98 0.73   CALCIUM 9.3 8.0*                   Imaging  DX-CHEST-PORTABLE (1 VIEW)   Final Result         1. No acute cardiopulmonary abnormalities are identified.      CT-ABDOMEN-PELVIS WITH   Final Result         1. A postsurgical hematoma measuring 7.5 x 6.8 x 7.6 cm extends from the right anterior pelvis into the right lower quadrant of the abdomen. There is mass effect on the urinary bladder with displacement of the urinary bladder to the left.      2. Mild stranding in the bilateral retroperitoneum and presacral stranding as well as free intraperitoneal air could relate to recent surgery.           Assessment/Plan  * Hematoma  Assessment & Plan  Noted to have bilateral inguinal hernia repair about 2 days ago.  CT abdomen showing A postsurgical hematoma measuring 7.5 x 6.8 x 7.6 cm extends from the right anterior pelvis into the right lower quadrant of the abdomen. There is mass effect on the urinary bladder with displacement of the urinary bladder to the left.  Surgery following, his diet will be advanced as tolerated.  Will monitor hemoglobin medically.  Today noted to be 10.3.     --Patient noted to be intractable abdominal pain, multimodal pain management.  Including p.o., IV narcotic, gabapentin, celecoxib.    Advance care planning  Assessment & Plan  Patient was of sound mind and voluntarily participated in the conversation.  Patient and myself were present for the conversation.  I discussed advance care planning face to face with the patient for at least patient 18 minutes, including diagnosis, prognosis, plan of care, risks and benefits of any therapies that could be offered, as well as alternatives including palliation and hospice,    Patient said that he is still young at 64 years old even though he has ulcerative colitis and prostate cancer; he stated that he is doing well.  He wanted all her recommendations including chest compression, Providing electric shock and intubation.       He opted for Full code     Prostate cancer (HCC)- (present on admission)  Assessment & Plan  Not on treatment at this time    Hypothyroidism  Assessment & Plan  Synthroid     Ulcerative colitis (HCC)- (present on admission)  Assessment & Plan  Following with GI outpatient.  Not on treatment at this time         VTE prophylaxis: scd

## 2024-11-24 NOTE — ASSESSMENT & PLAN NOTE
Patient was of sound mind and voluntarily participated in the conversation.  Patient and myself were present for the conversation.  I discussed advance care planning face to face with the patient for at least patient 18 minutes, including diagnosis, prognosis, plan of care, risks and benefits of any therapies that could be offered, as well as alternatives including palliation and hospice,    Patient said that he is still young at 64 years old even though he has ulcerative colitis and prostate cancer; he stated that he is doing well.  He wanted all her recommendations including chest compression, Providing electric shock and intubation.       He opted for Full code

## 2024-11-24 NOTE — PROGRESS NOTES
Report received from RN, assumed care at 1845  Pt is A0X4, and responds appropriately   Pt declines any SOB, chest pain, new onset of numbness/ tingling  Pt rates pain at 5/10, on a scale of 1-10, pt medicated per MAR  Pt is voiding adequately and without hesitancy  Pt has + flatus, - bowel sounds, + BM on 11/22  Pt ambulates with a standby assist   Pt is tolerating a regular diet, pt denies any nausea/vomiting  Plan of care discussed, all questions answered. Explained importance of calling before getting OOB and pt verbalizes understanding. Explained importance of oral care. Call light is within reach, treaded slipper socks on, bed in lowest/ locked position, hourly rounding in place, all needs met at this time

## 2024-11-24 NOTE — HOSPITAL COURSE
This 64-year-old male with a past medical significant for prostate cancer being followed by  [facility] urology as an outpatient, had bilateral inguinal hernia repair on 11/21/2022 presented to the ER on 11/22/2022 with complaint of intractable abdominal pain.      After patient was discharged home, he was noted to have severe abdominal pain associated with chills, diabetic p.o. intake because of the pain the stated to come to ER for evaluation.    Presentation in ER he is hemoglobin noted to be 13.3 CT abdomen howing postsurgical hematoma measuring 7.5 x 6.8 x 7 point subcentimeter extending from the right anterior pelvis into the right lower quadrant of the abdomen. Mass effect on the urinary bladder with displacement of the urinary bladder to the left.      Surgery was consulted; and stated that  this is  contained within the pre-peritoneal space, which should allow for tamponade. No surgical intervention. No indication for drainage given risk of infecting the mesh.  His hemoglobin and hematocrit remained stable, at the time of discharge, his hemoglobin noted to be 11.  Patient be discharged home on multimodal pain management including Robaxin, gabapentin, Tylenol, oxycodone.  I discussed and verified with surgeon that celecoxib is okay.    Patient will provide extra Zofran MiraLAX for bowel movement.  At this time patient medically stable to be discharged home.  For all other medical condition, patient will resume his home medication    Interval events:  -- Patient is alert, awake, symptoms appropriate, vitals and has been reviewed, heart rate 76-83, blood pressure 114/66, satting 97% on room air.  Patient states that his pain is intractable.  He he was started on multimodal pain management, currently on hydromorphone, gabapentin, celecoxib, oxycodone.  -- Patient had IV and p.o. narcotic, will monitor his respiratory status understands  -- Patient will need continuous pulse ox  -- Aggressive bowel regimen advised  the patient to walk in hallway multiple times without nursing assistance.  -- Also patient is to have a bowel movement, but he will need outpatient bowel regimen especially when he has not decided.  The patient    Hemoglobin noted to be 10.3.  Surgery continue to follow, recommended daily CBC.

## 2024-11-25 ENCOUNTER — PHARMACY VISIT (OUTPATIENT)
Dept: PHARMACY | Facility: MEDICAL CENTER | Age: 64
End: 2024-11-25
Payer: COMMERCIAL

## 2024-11-25 VITALS
BODY MASS INDEX: 21.8 KG/M2 | OXYGEN SATURATION: 97 % | DIASTOLIC BLOOD PRESSURE: 86 MMHG | TEMPERATURE: 99.6 F | RESPIRATION RATE: 17 BRPM | SYSTOLIC BLOOD PRESSURE: 129 MMHG | HEART RATE: 83 BPM | HEIGHT: 73 IN | WEIGHT: 164.46 LBS

## 2024-11-25 LAB
ALBUMIN SERPL BCP-MCNC: 3.9 G/DL (ref 3.2–4.9)
BUN SERPL-MCNC: 10 MG/DL (ref 8–22)
CALCIUM ALBUM COR SERPL-MCNC: 8.8 MG/DL (ref 8.5–10.5)
CALCIUM SERPL-MCNC: 8.7 MG/DL (ref 8.5–10.5)
CHLORIDE SERPL-SCNC: 102 MMOL/L (ref 96–112)
CO2 SERPL-SCNC: 24 MMOL/L (ref 20–33)
CREAT SERPL-MCNC: 0.79 MG/DL (ref 0.5–1.4)
ERYTHROCYTE [DISTWIDTH] IN BLOOD BY AUTOMATED COUNT: 41 FL (ref 35.9–50)
GFR SERPLBLD CREATININE-BSD FMLA CKD-EPI: 99 ML/MIN/1.73 M 2
GLUCOSE SERPL-MCNC: 109 MG/DL (ref 65–99)
HCT VFR BLD AUTO: 32.2 % (ref 42–52)
HGB BLD-MCNC: 11 G/DL (ref 14–18)
MCH RBC QN AUTO: 29.8 PG (ref 27–33)
MCHC RBC AUTO-ENTMCNC: 34.2 G/DL (ref 32.3–36.5)
MCV RBC AUTO: 87.3 FL (ref 81.4–97.8)
PHOSPHATE SERPL-MCNC: 2.5 MG/DL (ref 2.5–4.5)
PLATELET # BLD AUTO: 173 K/UL (ref 164–446)
PMV BLD AUTO: 9.1 FL (ref 9–12.9)
POTASSIUM SERPL-SCNC: 4.2 MMOL/L (ref 3.6–5.5)
RBC # BLD AUTO: 3.69 M/UL (ref 4.7–6.1)
SODIUM SERPL-SCNC: 138 MMOL/L (ref 135–145)
WBC # BLD AUTO: 8.6 K/UL (ref 4.8–10.8)

## 2024-11-25 PROCEDURE — 700102 HCHG RX REV CODE 250 W/ 637 OVERRIDE(OP): Performed by: SURGERY

## 2024-11-25 PROCEDURE — 80069 RENAL FUNCTION PANEL: CPT

## 2024-11-25 PROCEDURE — 99239 HOSP IP/OBS DSCHRG MGMT >30: CPT | Performed by: HOSPITALIST

## 2024-11-25 PROCEDURE — A9270 NON-COVERED ITEM OR SERVICE: HCPCS | Performed by: SURGERY

## 2024-11-25 PROCEDURE — 700102 HCHG RX REV CODE 250 W/ 637 OVERRIDE(OP): Performed by: HOSPITALIST

## 2024-11-25 PROCEDURE — A9270 NON-COVERED ITEM OR SERVICE: HCPCS | Performed by: HOSPITALIST

## 2024-11-25 PROCEDURE — A9270 NON-COVERED ITEM OR SERVICE: HCPCS | Performed by: STUDENT IN AN ORGANIZED HEALTH CARE EDUCATION/TRAINING PROGRAM

## 2024-11-25 PROCEDURE — RXMED WILLOW AMBULATORY MEDICATION CHARGE: Performed by: SURGERY

## 2024-11-25 PROCEDURE — 99232 SBSQ HOSP IP/OBS MODERATE 35: CPT | Performed by: SURGERY

## 2024-11-25 PROCEDURE — 700102 HCHG RX REV CODE 250 W/ 637 OVERRIDE(OP): Performed by: STUDENT IN AN ORGANIZED HEALTH CARE EDUCATION/TRAINING PROGRAM

## 2024-11-25 PROCEDURE — RXMED WILLOW AMBULATORY MEDICATION CHARGE: Performed by: HOSPITALIST

## 2024-11-25 PROCEDURE — 85027 COMPLETE CBC AUTOMATED: CPT

## 2024-11-25 RX ORDER — CELECOXIB 200 MG/1
200 CAPSULE ORAL 2 TIMES DAILY
Status: DISCONTINUED | OUTPATIENT
Start: 2024-11-25 | End: 2024-11-25 | Stop reason: HOSPADM

## 2024-11-25 RX ORDER — ACETAMINOPHEN 500 MG
1000 TABLET ORAL EVERY 6 HOURS
Qty: 30 TABLET | Refills: 0 | Status: SHIPPED | OUTPATIENT
Start: 2024-11-25

## 2024-11-25 RX ORDER — GABAPENTIN 300 MG/1
300 CAPSULE ORAL 3 TIMES DAILY
Qty: 90 CAPSULE | Refills: 0 | Status: SHIPPED | OUTPATIENT
Start: 2024-11-25 | End: 2024-12-25

## 2024-11-25 RX ORDER — METHOCARBAMOL 750 MG/1
750 TABLET, FILM COATED ORAL 4 TIMES DAILY
Qty: 28 TABLET | Refills: 0 | Status: SHIPPED | OUTPATIENT
Start: 2024-11-25 | End: 2024-12-02

## 2024-11-25 RX ORDER — GABAPENTIN 300 MG/1
300 CAPSULE ORAL 3 TIMES DAILY
Status: DISCONTINUED | OUTPATIENT
Start: 2024-11-25 | End: 2024-11-25 | Stop reason: HOSPADM

## 2024-11-25 RX ORDER — OXYCODONE HYDROCHLORIDE 10 MG/1
10 TABLET ORAL EVERY 4 HOURS PRN
Qty: 30 TABLET | Refills: 0 | Status: SHIPPED | OUTPATIENT
Start: 2024-11-25 | End: 2024-11-30

## 2024-11-25 RX ORDER — CELECOXIB 200 MG/1
200 CAPSULE ORAL 2 TIMES DAILY
Qty: 60 CAPSULE | Refills: 1 | Status: SHIPPED | OUTPATIENT
Start: 2024-11-25

## 2024-11-25 RX ADMIN — ACETAMINOPHEN 1000 MG: 500 TABLET ORAL at 12:47

## 2024-11-25 RX ADMIN — METHOCARBAMOL TABLETS 750 MG: 750 TABLET, COATED ORAL at 12:47

## 2024-11-25 RX ADMIN — SIMETHICONE 125 MG: 125 TABLET, CHEWABLE ORAL at 03:40

## 2024-11-25 RX ADMIN — POLYETHYLENE GLYCOL 3350 1 PACKET: 17 POWDER, FOR SOLUTION ORAL at 04:23

## 2024-11-25 RX ADMIN — GABAPENTIN 100 MG: 100 CAPSULE ORAL at 04:22

## 2024-11-25 RX ADMIN — OXYCODONE HYDROCHLORIDE 10 MG: 10 TABLET ORAL at 12:48

## 2024-11-25 RX ADMIN — METHOCARBAMOL TABLETS 750 MG: 750 TABLET, COATED ORAL at 08:31

## 2024-11-25 RX ADMIN — OXYCODONE HYDROCHLORIDE 10 MG: 10 TABLET ORAL at 03:41

## 2024-11-25 RX ADMIN — CELECOXIB 200 MG: 200 CAPSULE ORAL at 12:47

## 2024-11-25 RX ADMIN — ACETAMINOPHEN 1000 MG: 500 TABLET ORAL at 04:23

## 2024-11-25 RX ADMIN — SENNOSIDES AND DOCUSATE SODIUM 1 TABLET: 50; 8.6 TABLET ORAL at 04:22

## 2024-11-25 RX ADMIN — GABAPENTIN 300 MG: 300 CAPSULE ORAL at 12:47

## 2024-11-25 RX ADMIN — OXYCODONE HYDROCHLORIDE 10 MG: 10 TABLET ORAL at 08:31

## 2024-11-25 RX ADMIN — LEVOTHYROXINE SODIUM 150 MCG: 0.12 TABLET ORAL at 04:22

## 2024-11-25 ASSESSMENT — PAIN DESCRIPTION - PAIN TYPE: TYPE: ACUTE PAIN

## 2024-11-25 NOTE — PROGRESS NOTES
Patient transferring to discharge lounge at this time. Wife to provide patient transportation home from hospital. Discharge lounge RN to provide d/c education, follow-up information, remove PIV, and provide meds-to-beds. Patient transporting via wheelchair. Belongings with patient at time of transfer.

## 2024-11-25 NOTE — PROGRESS NOTES
Received report of patient at start of shift. Patient is AOx4, PRN and scheduled medications administered for complaints of pain. Assessment complete. Patient ambulates with SBA. Patient updated on plan of care, encouraged to notify staff for any needs/assistance. Call light within reach.

## 2024-11-25 NOTE — CARE PLAN
The patient is Stable - Low risk of patient condition declining or worsening    Shift Goals  Clinical Goals: pain management, increased mobility, rest  Patient Goals: pain management  Family Goals: gisselle    Progress made toward(s) clinical / shift goals: Patient's pain medicated per MAR. POC discussed with verbal understanding. Patient intermittently sleeping throughout shift.       Problem: Pain - Standard  Goal: Alleviation of pain or a reduction in pain to the patient’s comfort goal  Description: Target End Date:  Prior to discharge or change in level of care    Document on Vitals flowsheet    1.  Document pain using the appropriate pain scale per order or unit policy  2.  Educate and implement non-pharmacologic comfort measures (i.e. relaxation, distraction, massage, cold/heat therapy, etc.)  3.  Pain management medications as ordered  4.  Reassess pain after pain med administration per policy  5.  If opiods administered assess patient's response to pain medication is appropriate per POSS sedation scale  6.  Follow pain management plan developed in collaboration with patient and interdisciplinary team (including palliative care or pain specialists if applicable)  Outcome: Progressing     Problem: Knowledge Deficit - Standard  Goal: Patient and family/care givers will demonstrate understanding of plan of care, disease process/condition, diagnostic tests and medications  Description: Target End Date:  1-3 days or as soon as patient condition allows    Document in Patient Education    1.  Patient and family/caregiver oriented to unit, equipment, visitation policy and means for communicating concern  2.  Complete/review Learning Assessment  3.  Assess knowledge level of disease process/condition, treatment plan, diagnostic tests and medications  4.  Explain disease process/condition, treatment plan, diagnostic tests and medications  Outcome: Progressing       Patient is not progressing towards the following  goals:

## 2024-11-25 NOTE — CARE PLAN
The patient is Stable - Low risk of patient condition declining or worsening    Shift Goals  Clinical Goals: Pain management, increased mobility  Patient Goals: Pain management, increased mobility  Family Goals: gisselle    Progress made toward(s) clinical / shift goals:  PRN and scheduled medications administered for complaints of pain. Patient ambulates with SBA, walked in hallway today with assistance from CNA.    Patient is not progressing towards the following goals: N/A

## 2024-11-25 NOTE — DOCUMENTATION QUERY
[facility]                                                                       Query Response Note      PATIENT:               JANELLE MCKEON  ACCT #:                  [id number]  MRN:                     [id number]  :                      1960  ADMIT DATE:       2024 11:40 PM  DISCH DATE:          RESPONDING  PROVIDER #:        [id number]           QUERY TEXT:    Anemia is documented in the Medical Record.     Can the type of anemia be further specified?    *Note:  If you agree with a diagnosis listed, please remember to include it in your daily concurrent documentation and onto the Discharge Summary    The patient's Clinical Indicators include:  Findings:  --Pt who is s/p bilateral robotic indirect inguinal hernia repair done on  admitted for post op hematoma  --Per H&P, ''Pt is now anemic, likely from surgery and hematoma'' documented  --Labs on :  rbc 4.34, H&H 13.3/38.1  --H&H later on :  11.3/31.6, 10.8/31.2, 11.5/32.5, 10.9/30.3  --Labs on :  H&H 10.3/29.6  --Labs on : rbc 3.69, H&H 11.0/32.2    Treatment:  --H&H q6hr  --Daily CBC    Risk Factors:  --Post op hematoma  --Hx of prostate CA    Thank you,  Marielos Gilmore BSN, RN  Clinical   Connect via [employer]  Options provided:   -- Blood loss anemia, acute   -- Blood loss anemia, chronic   -- Chronic anemia   -- Due to/in/with neoplastic disease   -- Iron deficiency anemia   -- Macrocytic anemia   -- Microcytic anemia   -- Normocytic anemia   -- Postoperative blood loss anemia   -- Pernicious anemia   -- Other explanation, (please specify other explanation)   -- Unable to determine      Query created by: Marielos Gilmore on 2024 11:49 AM    RESPONSE TEXT:    Blood loss anemia, chronic          Electronically signed by:  JOSY LAGOS 2024 1:58 PM

## 2024-11-25 NOTE — DISCHARGE SUMMARY
Discharge Summary    CHIEF COMPLAINT ON ADMISSION  Chief Complaint   Patient presents with    Post-Op Complications       Reason for Admission  Post Op Complications     Admission Date  11/22/2024    CODE STATUS  Full Code    HPI & HOSPITAL COURSE  This 64-year-old male with a past medical significant for prostate cancer being followed by  [facility] urology as an outpatient, had bilateral inguinal hernia repair on 11/21/2022 presented to the ER on 11/22/2022 with complaint of intractable abdominal pain.      After patient was discharged home, he was noted to have severe abdominal pain associated with chills, diabetic p.o. intake because of the pain the stated to come to ER for evaluation.    Presentation in ER he is hemoglobin noted to be 13.3 CT abdomen howing postsurgical hematoma measuring 7.5 x 6.8 x 7 point subcentimeter extending from the right anterior pelvis into the right lower quadrant of the abdomen. Mass effect on the urinary bladder with displacement of the urinary bladder to the left.      Surgery was consulted; and stated that  this is  contained within the pre-peritoneal space, which should allow for tamponade. No surgical intervention. No indication for drainage given risk of infecting the mesh.  His hemoglobin and hematocrit remained stable, at the time of discharge, his hemoglobin noted to be 11.  Patient be discharged home on multimodal pain management including Robaxin, gabapentin, Tylenol, oxycodone.  I discussed and verified with surgeon that celecoxib is okay.    Patient will provide extra Zofran MiraLAX for bowel movement.  At this time patient medically stable to be discharged home.  For all other medical condition, patient will resume his home medication    Therefore, he is discharged in good and stable condition to home with close outpatient follow-up.        Discharge Date  11/25/2024      FOLLOW UP ITEMS POST DISCHARGE  Please follow  up with surgery as an op    DISCHARGE  DIAGNOSES  Principal Problem:    Hematoma (POA: Unknown)  Active Problems:    Ulcerative colitis (HCC) (Chronic) (POA: Yes)      Overview: FB DHA. Under control . Pt asympt      Last colonoscopy feb 2024>> polyps noted. GI rec to repeat feb 2026                Hypothyroidism (POA: Unknown)      Overview: Dr. Trevino      Synthroid (did not tolerate generic)    Prostate cancer (HCC) (Chronic) (POA: Yes)      Overview: Fb URO. NO RX  at this time . Cont monitor w uro            8.2024          Advance care planning (POA: Unknown)  Resolved Problems:    Postoperative hematoma of both eyes complicating ophthalmic procedure (POA: Yes)      FOLLOW UP  Future Appointments   Date Time Provider Department Center   12/20/2024  9:00 AM Rashad Glynn M.D. [facility]     No follow-up provider specified.    MEDICATIONS ON DISCHARGE     Medication List        START taking these medications        Instructions   acetaminophen 500 MG Tabs  Commonly known as: Tylenol   Take 2 Tablets by mouth every 6 hours.  Dose: 1,000 mg     celecoxib 200 MG Caps  Commonly known as: CeleBREX   Take 1 Capsule by mouth 2 times a day.  Dose: 200 mg     gabapentin 300 MG Caps  Commonly known as: Neurontin   Take 1 Capsule by mouth 3 times a day for 30 days.  Dose: 300 mg     magnesium hydroxide 400 MG/5ML Susp  Commonly known as: Milk Of Magnesia   Take 30 mL by mouth 1 time a day as needed (constipation) for up to 30 days.  Dose: 30 mL     oxyCODONE immediate release 10 MG immediate release tablet  Commonly known as: Roxicodone   Take 1 Tablet by mouth every four hours as needed for Severe Pain for up to 5 days.  Dose: 10 mg            CHANGE how you take these medications        Instructions   levothyroxine 150 MCG Tabs  Commonly known as: Synthroid   Take 1 Tablet by mouth every morning on an empty stomach.  Dose: 150 mcg     methocarbamol 750 MG Tabs  What changed:   medication strength  how much to take  when to take this  reasons to  take this  Commonly known as: Robaxin   Take 1 Tablet by mouth 4 times a day for 7 days.  Dose: 750 mg            CONTINUE taking these medications        Instructions   docusate sodium 100 MG Caps  Commonly known as: Colace   Take 100 mg by mouth 2 times a day as needed for Constipation.   Dose: 100 mg     PEG 3350 Pack   Mix 1 Packet per package instructions and drink by mouth 1 time a day as needed (Constipation) for up to 14 days.  Dose: 17 g     simethicone 125 MG chewable tablet  Commonly known as: Mylicon   Chew 125 mg every 6 hours as needed for Flatulence.  Dose: 125 mg            STOP taking these medications      meloxicam 15 MG tablet  Commonly known as: Mobic     naproxen 500 MG Tabs  Commonly known as: Naprosyn     oxyCODONE-acetaminophen 5-325 MG Tabs  Commonly known as: Percocet              Allergies  Allergies   Allergen Reactions    Gluten Meal      GI issues with Gluten       DIET  Orders Placed This Encounter   Procedures    Diet Order Diet: Regular     Standing Status:   Standing     Number of Occurrences:   1     Order Specific Question:   Diet:     Answer:   Regular [1]       ACTIVITY  As tolerated.  Weight bearing as tolerated    CONSULTATIONS  Surgery     PROCEDURES  none    LABORATORY  Lab Results   Component Value Date    SODIUM 138 11/25/2024    POTASSIUM 4.2 11/25/2024    CHLORIDE 102 11/25/2024    CO2 24 11/25/2024    GLUCOSE 109 (H) 11/25/2024    BUN 10 11/25/2024    CREATININE 0.79 11/25/2024        Lab Results   Component Value Date    WBC 8.6 11/25/2024    HEMOGLOBIN 11.0 (L) 11/25/2024    HEMATOCRIT 32.2 (L) 11/25/2024    PLATELETCT 173 11/25/2024        Total time of the discharge process exceeds 39 minutes.

## 2024-11-25 NOTE — CARE PLAN
The patient is Stable - Low risk of patient condition declining or worsening    Shift Goals  Clinical Goals: Pain management, discharge planning  Patient Goals: Pain management, discharge planning  Family Goals: gisselle    Progress made toward(s) clinical / shift goals:  Patient reports adequate pain control with use of scheduled and PRN medications. Patient discharged home today per MD order. Discharge lounge utilized.      Patient is not progressing towards the following goals: N/A

## 2024-11-25 NOTE — PROGRESS NOTES
Report received from RN, assumed care at 1845  Pt is A0X4, and responds appropriately   Pt declines any SOB, chest pain, new onset of numbness/ tingling  Pt rates pain at 6/10, on a scale of 1-10, pt medicated per MAR  Pt is voiding adequately and without hesitancy  Pt has + flatus, + bowel sounds, + BM on PTA (11/22)  Pt ambulates with a standby assist   Pt is tolerating a regular diet, pt denies any nausea/vomiting  Plan of care discussed, all questions answered. Explained importance of calling before getting OOB and pt verbalizes understanding. Explained importance of oral care. Call light is within reach, treaded slipper socks on, bed in lowest/ locked position, hourly rounding in place, all needs met at this time.

## 2024-11-25 NOTE — DISCHARGE INSTRUCTIONS
DEPARTMENT OF SURGERY    Discharge Instructions for Inguinal Hernia Surgery      You just had surgery to repair an inguinal hernia caused by a weakness in the abdominal wall of your groin area.    Now that you are going home, follow the surgeon's instructions on self-care at home.    When You're in the Hospital    During surgery, you had anesthesia. This may have been general (asleep and pain-free) or spinal or epidural (numb from the waist down) anesthesia. If the hernia was small, it may have been repaired under local anesthesia (awake but pain-free).    The nurse will give you pain medicine and help you begin to move around. Rest and gentle movement are important for recovery.    You may go home the same day as surgery. Or the hospital stay may be 1 to 2 days. It will depend on the procedure that was done.    What to Expect at Home    After hernia repair:    If there are visible stitches on the skin, they will need to be removed at a follow-up visit with the surgeon. If stitches under the skin were used, they will dissolve on their own. The incisions are covered with a bandage or with a liquid adhesive (skin glue).    You may have pain, soreness, and stiffness at first, especially when moving about. This is normal.    You will also feel tired after surgery. This can last for a few weeks.    You will most likely return to normal activities in just a few weeks.    Men may have swelling and pain in their testicles.    There may be some bruising around the groin and testicular area.    You may have trouble passing urine for the first few days.    Make sure you get plenty of rest the first 2 to 3 days after going home. Ask family and friends for help with daily activities while your movements are limited.    Managing Pain    Use any pain medicines as instructed by the surgeon or nurse. You may be given a prescription for a narcotic pain medicine. Over-the-counter pain medicine (ibuprofen, acetaminophen) can be  used if the narcotic medicine is too strong.    Apply an ice pack or cold compress to the groin area for 30 to 45 minutes at a time for the first three days. This will help significantly with your pain and swelling. Wrap the compress or ice in a towel. This helps prevent cold injury to the skin.    Wound Care    There may be a bandage over the incision. Follow the surgeon's instructions for how long to leave it on and when to change it. If skin glue was used, a bandage may not have been used.    A little bleeding and drainage is normal for the first few days. Apply antibiotic ointment (bacitracin, polysporin) or another solution to the incision area if the surgeon or nurse told you to.    Wash the area with mild soap and water when the surgeon says it is OK to do so. Gently pat it dry. Do not take a bath, soak in a hot tub, or go swimming for the first week after surgery.    Diet During Recovery    Pain medicines can cause constipation. Eating some high-fiber foods and drinking plenty of water can help keep the bowels moving. Use over the counter fiber products if constipation does not improve.    Antibiotics can cause diarrhea. If this happens, try eating yogurt with live cultures or taking psyllium (Metamucil). Call the surgeon if the diarrhea does not get better.    Activity for Adults    Give yourself time to heal. You may gradually resume normal activities, such as walking, driving, and sexual activity, when you are ready. But you probably will not feel like doing anything strenuous for a few weeks.    Do not drive if you are taking narcotic pain medicines.    Do not lift anything over 10 pounds (about a gallon of milk) for 4 weeks, or until your doctor tells you it is OK. If possible avoid doing any activity that causes pain, or pulls on the area of surgery. Older boys and men may want to wear an athletic supporter if they have swelling or pain in the testicles.    Check with the surgeon before returning to  sports or other high-impact activities. Protect the incision area from the sun for 1 year to prevent noticeable scarring.    When to Call the Doctor    Call the surgeon if you have any of the following:    Difficulty breathing or chest pain (CALL 911)  Light headedness that does not go away after a few days  Severe pain or soreness that does not improve with time  Incisions are bleeding, red or warm to the touch, or have a thick, yellow, green, or pus-like drainage  Chills, or fever of 101°F (38.3°C), or higher  Swelling or pain in the testicles that is getting worse  Trouble urinating    Follow up Instructions:    Contact our office at [phone] for a follow-up appointment within 2 weeks following your procedure. Our office hours are Monday - Friday, 8.00 am - 4.00 pm. Please try to call during these hours when we are better able to assist you.    If you have any additional questions, please do not hesitate to call our office and speak to the staff or provider on call.    Office Address:    [facility]  Department of Surgery  [address] [place] [address],  [address],  [place] [address]    Thank you for this opportunity to provide excellent surgical care for you today.

## 2024-11-25 NOTE — PROGRESS NOTES
"    DEPARTMENT OF SURGERY  BARIATRIC AND GASTROESOPHAGEAL SURGERY    PROGRESS NOTE      POD #4, s/p robotic bilateral inguinal hernia repair with mesh (rTAPP). Readmitted on 11/22 for significant lower abdominal pain and bloating, and was found to have a large right pre-peritoneal hematoma. This is being managed conservatively with pain control, abdominal exams, and Hgb/Hct checks.    Subjective:     Reports feeling reasonably well - lower abdominal pain is improving, bloating has resolved, and patient is passing flatus and had one BM last night. Otherwise, no recent history of fevers/chills, vomiting/hematemesis, odynophagia, CP/SOB, belching, dysuria/hematuria, BRBPR/melena, or diarrhea. No other symptoms or complaints at this time. Tolerating a regular diet, ambulating in the hallways w/o difficulties, and voiding spontaneously.    Objective:    /86   Pulse 83   Temp 37.6 °C (99.6 °F) (Temporal)   Resp 17   Ht 1.854 m (6' 1\")   Wt 74.6 kg (164 lb 7.4 oz)   SpO2 97%     I/O last 3 completed shifts:  In: 360 [P.O.:360]  Out: 1726 [Urine:1726]    Current Facility-Administered Medications   Medication Dose    gabapentin (Neurontin) capsule 300 mg  300 mg    celecoxib (CeleBREX) capsule 200 mg  200 mg    magnesium hydroxide (Milk Of Magnesia) suspension 30 mL  30 mL    HYDROmorphone (Dilaudid) injection 1 mg  1 mg    bisacodyl (Dulcolax) suppository 10 mg  10 mg    senna-docusate (Pericolace Or Senokot S) 8.6-50 MG per tablet 1 Tablet  1 Tablet    polyethylene glycol/lytes (Miralax) Packet 1 Packet  1 Packet    oxyCODONE immediate release (Roxicodone) tablet 10 mg  10 mg    levothyroxine (Synthroid) tablet 150 mcg  150 mcg    ondansetron (Zofran) syringe/vial injection 4 mg  4 mg    ondansetron (Zofran ODT) dispertab 4 mg  4 mg    promethazine (Phenergan) tablet 12.5-25 mg  12.5-25 mg    promethazine (Phenergan) suppository 12.5-25 mg  12.5-25 mg    prochlorperazine (Compazine) injection 5-10 mg  5-10 mg "    labetalol (Normodyne/Trandate) injection 10 mg  10 mg    Pharmacy Consult Request ...Pain Management Review 1 Each  1 Each    acetaminophen (Tylenol) tablet 1,000 mg  1,000 mg    methocarbamol (Robaxin) tablet 750 mg  750 mg    simethicone (Mylicon) chewable tablet 125 mg  125 mg     Physical Exam:     Gen - comfortable, NAD, A&O x 3  HEENT - anicteric, PERRLA  CV - regular rate and rhythm  Abd - soft, + min TTP @ epigastrium and RUQ, no rebound/guarding, no distention, no palp masses or bulges  Inc - all lap incisions are C/D/I - no evidence of infection or bulges; + min bruising at all incisions  Ext - no clubbing, cyanosis or edema bilaterally  Skin - no rashes/lesions, no open wounds, no jaundice    Labs:    Recent Labs     11/23/24  0014 11/23/24 0348 11/23/24 2222 11/24/24 0327 11/25/24 0328   WBC 11.4*  --   --   --  8.6   RBC 4.34*  --   --   --  3.69*   HEMOGLOBIN 13.3*   < > 10.9* 10.3* 11.0*   HEMATOCRIT 38.1*   < > 30.3* 29.6* 32.2*   MCV 87.8  --   --   --  87.3   MCH 30.6  --   --   --  29.8   MCHC 34.9  --   --   --  34.2   RDW 42.3  --   --   --  41.0   PLATELETCT 211  --   --   --  173   MPV 9.0  --   --   --  9.1    < > = values in this interval not displayed.     Recent Labs     11/23/24  0014 11/24/24 0327 11/25/24 0328   SODIUM 140 138 138   POTASSIUM 3.8 3.5* 4.2   CHLORIDE 100 103 102   CO2 22 25 24   GLUCOSE 115* 102* 109*   BUN 15 10 10     Imaging:    DX-CHEST-PORTABLE (1 VIEW)    Result Date: 11/23/2024 11/23/2024 12:40 AM HISTORY/REASON FOR EXAM:  Shortness of Breath TECHNIQUE/EXAM DESCRIPTION AND NUMBER OF VIEWS: Single portable view of the chest. COMPARISON: 10/5/2013 FINDINGS: No pulmonary infiltrates or consolidations are noted. No pleural effusion. No pneumothorax. Normal cardiopericardial silhouette.     1. No acute cardiopulmonary abnormalities are identified.    CT-ABDOMEN-PELVIS WITH    Result Date: 11/23/2024 11/23/2024 12:19 AM HISTORY/REASON FOR EXAM:  bilateral  hernia repair yesterday, states diffuse abd pain, mostly in lower abd, states feels like gas. . TECHNIQUE/EXAM DESCRIPTION:   CT scan of the abdomen and pelvis with contrast. Contrast-enhanced helical scanning was obtained from the diaphragmatic domes through the pubic symphysis following the bolus administration of nonionic contrast without complication. 100 mL of Omnipaque 350 nonionic contrast was administered without complication. Low dose optimization technique was utilized for this CT exam including automated exposure control and adjustment of the mA and/or kV according to patient size. COMPARISON: No prior studies available. FINDINGS: Lower Chest: No pleural or pericardial fluid.. Liver: Normal. Spleen: Unremarkable. Pancreas: Unremarkable. Gallbladder: No calcified stones. Biliary: There is no biliary dilatation. Adrenal glands: A coarse calcification in the right adrenal gland. Kidneys: No hydronephrosis. Bilateral kidneys are enhancing symmetrically.. Bowel: There is no bowel wall thickening or abnormal dilatation. Lymph nodes: No adenopathy. Vasculature: The abdominal aorta is normal in caliber. Peritoneum: No ascites. Free intraperitoneal air could relate to recent surgery. Musculoskeletal: No aggressive bone lesions are seen. Subcutaneous gas seen throughout the anterior abdominal wall. Pelvis: Mild stranding in the bilateral retroperitoneum and presacral stranding There is a 7.5 x 6.8 x 7.6 cm hematoma extending along the right anterior pelvis to the right lower quadrant abdomen.     1. A postsurgical hematoma measuring 7.5 x 6.8 x 7.6 cm extends from the right anterior pelvis into the right lower quadrant of the abdomen. There is mass effect on the urinary bladder with displacement of the urinary bladder to the left. 2. Mild stranding in the bilateral retroperitoneum and presacral stranding as well as free intraperitoneal air could relate to recent surgery.      Assessment/Plan:    Postoperative  abdominal pain and bloating  Large right pre-peritoneal hematoma  S/p robotic bilateral inguinal hernia repair with mesh (rTAPP) on 11/21/2024.    POD #4, s/p robotic bilateral inguinal hernia repair with mesh (rTAPP). Readmitted on 11/22 for significant lower abdominal pain and bloating, and was found to have a large right pre-peritoneal hematoma. This is being managed conservatively with pain control, abdominal exams, and Hgb/Hct checks.    Demonstrating clinical improvement (with pain/bloating), Hgb/Hct has remained stable since admission, and patient is urinating freely, had one BM, and is tolerating a regular diet w/o issues.    Patient is stable for discharge home today, with close follow up in the Surgery clinic (in 2 weeks). OK to Rx opioid meds + Celebrex, Robaxin, and Gabapentin for multi-modality pain control. Recommend Miralax PRN constipation, as well.    Regular diet is OK, no heavy lifting (> 15 lbs) or strenuous activity for 4 weeks postop.    Thank you for giving us this opportunity to care for this patient.    Raymond Lee MD MPH FACS [facility]  Bariatric and Gastroesophageal Surgery  Department of Surgery, [facility]  Clinical Assistant Professor of Surgery  [facility]

## 2024-12-23 ENCOUNTER — OFFICE VISIT (OUTPATIENT)
Dept: SURGICAL ONCOLOGY | Facility: MEDICAL CENTER | Age: 64
End: 2024-12-23
Payer: COMMERCIAL

## 2024-12-23 VITALS
HEART RATE: 70 BPM | SYSTOLIC BLOOD PRESSURE: 116 MMHG | TEMPERATURE: 97.5 F | BODY MASS INDEX: 21.11 KG/M2 | WEIGHT: 160 LBS | OXYGEN SATURATION: 100 % | DIASTOLIC BLOOD PRESSURE: 78 MMHG

## 2024-12-23 DIAGNOSIS — Z98.890 S/P BILATERAL INGUINAL HERNIA REPAIR: ICD-10-CM

## 2024-12-23 DIAGNOSIS — Z87.19 S/P BILATERAL INGUINAL HERNIA REPAIR: ICD-10-CM

## 2024-12-23 PROCEDURE — 99024 POSTOP FOLLOW-UP VISIT: CPT | Performed by: SURGERY

## 2024-12-23 ASSESSMENT — FIBROSIS 4 INDEX: FIB4 SCORE: 1.64

## 2024-12-23 NOTE — PROGRESS NOTES
DEPARTMENT OF SURGERY  BARIATRIC AND GASTROESOPHAGEAL SURGERY      DATE OF SERVICE:  12/23/2024    REASON FOR VISIT:  Postoperative Evaluation    HISTORY OF PRESENT ILLNESS:  Mr. Toussaint is recovering reasonably well, overall, ~ 4 weeks after his robotic bilateral inguinal hernia repair with mesh. He is experiencing the following symptoms: very mild abdominal/incisional pain (at all incisions) and very mild, occasional, pain in bilateral groins. Of note, these symptoms are slowly improving. Otherwise, no recent history of fevers/chills, jaundice/icterus, heartburn/reflux, regurgitation, dysphagia/odynophagia, nausea/vomiting, hematemesis, bloating/distention, worsening abdominal pain, dysuria/hematuria, BRBPR/melena, or diarrhea. All abdominal incisions are healing well, without any surrounding redness, swelling, or drainage. No other symptoms or complaints at this time.     Readmissions, interventions, or reoperations since primary operation: YES . Of note, Mr. Toussaint was evaluated in the ED ~ 24 hours after his operation for significant bilateral inguinal and lower abdominal pain, and was found to have a large pre-peritoneal hematoma in the right groin, associated with a significant drop in Hct/Hgb. He was admitted to the hospital and monitored closely for hemodynamic instability and/or worsening clinical status. However, his condition started to improve post-admission with conservative management only, and he didn't require any additional operative interventions. Thus, patient was discharged home on 11/25/2024 in a good condition and hasn't had any significant postoperative issues since then.    Past Medical History:   Diagnosis Date    Arthritis     Bowel habit changes 11/01/2024    chronic constipation    Cancer (HCC) 11/01/2024    prostate cancer being monitored, no surgery    Hypothyroid 05/16/2013    IBD (inflammatory bowel disease)     Midline low back pain without sciatica 10/26/2020    Pain  11/01/2024    right shoulder pain and bilateral groin pain    Ulcerative colitis (HCC)     manged by diet over the past 30 years    Vitiligo 05/16/2013     Past Surgical History:   Procedure Laterality Date    REPAIR HERNIA INGUINAL ROBOT ASSISTED, USING DV5 Bilateral 11/21/2024    Procedure: REPAIR BILATERAL HERNIA INGUINAL ROBOT ASSISTED, USING DV5;  Surgeon: Raymond Lee M.D.;  Location: SURGERY [facility];  Service: Gen Robotic    PB SHLDR ARTHROSCOP,SURG,W/ROTAT CUFF REPB Right 09/30/2024    Procedure: RIGHT SHOULDER ARTHROSCOPY, POSSIBLE RIGHT ROTATOR CUFF REPAIR, RIGHT SUBACROMIAL DECOMPRESSION, RIGHT LABRAL DEBRIDEMENT, RIGHT OPEN BICEPS TENODESIS, REPAIRS AS INDICATED;  Surgeon: Rashad Glynn M.D.;  Location: [facility] [place];  Service: Orthopedics    KNEE RECONSTRUCTION  01/01/1988    Right knee   Tosin  ACL replacement     ORIF, KNEE  35 years ago    Right Knee Rebuild    SHOULDER SURGERY  Sept 30 2024     Allergies   Allergen Reactions    Gluten Meal      GI issues with Gluten       Current Outpatient Medications   Medication Sig Dispense Refill    acetaminophen (TYLENOL) 500 MG Tab Take 2 Tablets by mouth every 6 hours. 30 Tablet 0    gabapentin (NEURONTIN) 300 MG Cap Take 1 Capsule by mouth 3 times a day for 30 days. 90 Capsule 0    magnesium hydroxide (MILK OF MAGNESIA) 400 MG/5ML Suspension Take 30 mL by mouth 1 time a day as needed (constipation) for up to 30 days. 355 mL 0    celecoxib (CELEBREX) 200 MG Cap Take 1 Capsule by mouth 2 times a day. 60 Capsule 1    simethicone (MYLICON) 125 MG chewable tablet Chew 125 mg every 6 hours as needed for Flatulence.      docusate sodium (COLACE) 100 MG Cap Take 100 mg by mouth 2 times a day as needed for Constipation.         levothyroxine (SYNTHROID) 150 MCG Tab Take 1 Tablet by mouth every morning on an empty stomach. 90 Tablet 3     No current facility-administered medications for this visit.     PHYSICAL EXAM:    /78  (BP Location: Right arm, Patient Position: Sitting, BP Cuff Size: Adult)   Pulse 70   Temp 36.4 °C (97.5 °F) (Temporal)   Wt 72.6 kg (160 lb)   SpO2 100%   BMI 21.11 kg/m²     Body mass index is 21.11 kg/m².    General: healthy, alert, oriented, no distress, relaxed, cooperative  Abdomen: soft, with minimal tenderness in RUQ and RLQ, no distention, no rebound/guarding, no palpable bulges or masses  Incisions: clean, dry and intact; healing well, no evidence of infection or bulges  Drains/Tubes: none  Groin:    R - no bulges/masses, no palpable cough impulse, no overlying skin changes, no TTP   L - no bulges/masses, no palpable cough impulse, no overlying skin changes, no TTP  Genitalia: normal male genitalia, no scrotal masses or TTP, no overlying skin changes  Extremities: warm and well-perfused, without clubbing, cyanosis or edema bilaterally  Skin: no rashes, no ecchymoses, no petechiae, no jaundice, no open wounds  Psych: good judgement, mood and affect are appropriate, excellent hygiene    ASSESSMENT:  Mr. Toussaint returns to our clinic today for a 4 week postoperative visit after undergoing a robotic bilateral inguinal hernia repair with mesh. He is recovering well in the early postoperative setting, despite an initial setback caused by a large postoperative hematoma in the right groin (which required a hospital admission with conservative management). We had a lengthy discussion re: details of his procedure, findings at surgery, and expected postoperative recovery process. The patient expresses satisfaction with the results of his operation, thus far. All questions were answered in full and copies of the Operative report were provided to Mr. Toussaint for further reference.    Dietary progression, lifting restrictions, and activity recommendations were discussed, as well as, planned return to work and/or school.    PLAN:  1.  The following issues were addressed during this visit:  A. Continued pain  management, including minimizing opioid-based medications and relying on Tylenol and NSAID's for baseline pain control  B. Appropriate dietary modifications and prophylactic management of constipation with increased fiber and water intake  C. Adding Miralax PRN if constipation doesn't resolve with the above-mentioned strategies  D. Avoidance of heavy lifting (> 10-15 lbs) or strenuous physical activity for a month after surgery    2.  Patient has been instructed to return to our clinic PRN new questions/concerns.     Thank you for giving us the opportunity to care for your patient.      Sincerely,    Raymond Lee MD MPH FACS [facility]  Bariatric and Gastroesophageal Surgery  Department of Surgery, [facility]  Clinical Assistant Professor of Surgery  [facility] [employer]    12/23/2024

## 2025-04-22 ENCOUNTER — OFFICE VISIT (OUTPATIENT)
Dept: MEDICAL GROUP | Facility: PHYSICIAN GROUP | Age: 65
End: 2025-04-22
Payer: COMMERCIAL

## 2025-04-22 VITALS
HEIGHT: 73 IN | DIASTOLIC BLOOD PRESSURE: 80 MMHG | BODY MASS INDEX: 22.26 KG/M2 | SYSTOLIC BLOOD PRESSURE: 126 MMHG | HEART RATE: 82 BPM | OXYGEN SATURATION: 97 % | WEIGHT: 168 LBS | RESPIRATION RATE: 16 BRPM | TEMPERATURE: 97.9 F

## 2025-04-22 DIAGNOSIS — R73.03 PREDIABETES: Chronic | ICD-10-CM

## 2025-04-22 DIAGNOSIS — C61 PROSTATE CANCER (HCC): Chronic | ICD-10-CM

## 2025-04-22 DIAGNOSIS — M25.50 ARTHRALGIA, UNSPECIFIED JOINT: ICD-10-CM

## 2025-04-22 DIAGNOSIS — D64.9 ANEMIA, UNSPECIFIED TYPE: ICD-10-CM

## 2025-04-22 DIAGNOSIS — E03.9 ACQUIRED HYPOTHYROIDISM: Chronic | ICD-10-CM

## 2025-04-22 DIAGNOSIS — E78.5 DYSLIPIDEMIA: Chronic | ICD-10-CM

## 2025-04-22 DIAGNOSIS — K51.919 ULCERATIVE COLITIS WITH COMPLICATION, UNSPECIFIED LOCATION (HCC): ICD-10-CM

## 2025-04-22 PROBLEM — M26.621 ARTHRALGIA OF RIGHT TEMPOROMANDIBULAR JOINT: Status: RESOLVED | Noted: 2021-08-05 | Resolved: 2025-04-22

## 2025-04-22 PROBLEM — T14.8XXA HEMATOMA: Status: RESOLVED | Noted: 2024-11-23 | Resolved: 2025-04-22

## 2025-04-22 PROBLEM — Z71.89 ADVANCE CARE PLANNING: Status: RESOLVED | Noted: 2024-11-24 | Resolved: 2025-04-22

## 2025-04-22 PROCEDURE — 3079F DIAST BP 80-89 MM HG: CPT | Performed by: INTERNAL MEDICINE

## 2025-04-22 PROCEDURE — 99214 OFFICE O/P EST MOD 30 MIN: CPT | Performed by: INTERNAL MEDICINE

## 2025-04-22 PROCEDURE — 3074F SYST BP LT 130 MM HG: CPT | Performed by: INTERNAL MEDICINE

## 2025-04-22 ASSESSMENT — PATIENT HEALTH QUESTIONNAIRE - PHQ9: CLINICAL INTERPRETATION OF PHQ2 SCORE: 0

## 2025-04-22 ASSESSMENT — FIBROSIS 4 INDEX: FIB4 SCORE: 1.66

## 2025-04-23 ENCOUNTER — HOSPITAL ENCOUNTER (OUTPATIENT)
Dept: LAB | Facility: MEDICAL CENTER | Age: 65
End: 2025-04-23
Attending: INTERNAL MEDICINE
Payer: COMMERCIAL

## 2025-04-23 DIAGNOSIS — D64.9 ANEMIA, UNSPECIFIED TYPE: ICD-10-CM

## 2025-04-23 DIAGNOSIS — C61 PROSTATE CANCER (HCC): Chronic | ICD-10-CM

## 2025-04-23 DIAGNOSIS — R73.03 PREDIABETES: Chronic | ICD-10-CM

## 2025-04-23 DIAGNOSIS — E03.9 ACQUIRED HYPOTHYROIDISM: Chronic | ICD-10-CM

## 2025-04-23 DIAGNOSIS — M25.50 ARTHRALGIA, UNSPECIFIED JOINT: ICD-10-CM

## 2025-04-23 DIAGNOSIS — E78.5 DYSLIPIDEMIA: Chronic | ICD-10-CM

## 2025-04-23 LAB
ALT SERPL-CCNC: 12 U/L (ref 2–50)
ANION GAP SERPL CALC-SCNC: 10 MMOL/L (ref 7–16)
BASOPHILS # BLD AUTO: 0.6 % (ref 0–1.8)
BASOPHILS # BLD: 0.06 K/UL (ref 0–0.12)
BUN SERPL-MCNC: 12 MG/DL (ref 8–22)
CALCIUM SERPL-MCNC: 8.9 MG/DL (ref 8.5–10.5)
CHLORIDE SERPL-SCNC: 103 MMOL/L (ref 96–112)
CHOLEST SERPL-MCNC: 168 MG/DL (ref 100–199)
CO2 SERPL-SCNC: 26 MMOL/L (ref 20–33)
CREAT SERPL-MCNC: 0.91 MG/DL (ref 0.5–1.4)
CRP SERPL HS-MCNC: 1.74 MG/DL (ref 0–0.75)
EOSINOPHIL # BLD AUTO: 1.43 K/UL (ref 0–0.51)
EOSINOPHIL NFR BLD: 14.6 % (ref 0–6.9)
ERYTHROCYTE [DISTWIDTH] IN BLOOD BY AUTOMATED COUNT: 40.7 FL (ref 35.9–50)
ERYTHROCYTE [SEDIMENTATION RATE] IN BLOOD BY WESTERGREN METHOD: 7 MM/HOUR (ref 0–20)
EST. AVERAGE GLUCOSE BLD GHB EST-MCNC: 131 MG/DL
FASTING STATUS PATIENT QL REPORTED: NORMAL
FERRITIN SERPL-MCNC: 204 NG/ML (ref 22–322)
FOLATE SERPL-MCNC: 13.6 NG/ML
GFR SERPLBLD CREATININE-BSD FMLA CKD-EPI: 94 ML/MIN/1.73 M 2
GLUCOSE SERPL-MCNC: 101 MG/DL (ref 65–99)
HBA1C MFR BLD: 6.2 % (ref 4–5.6)
HCT VFR BLD AUTO: 45 % (ref 42–52)
HDLC SERPL-MCNC: 40 MG/DL
HGB BLD-MCNC: 14.7 G/DL (ref 14–18)
HGB RETIC QN AUTO: 34.8 PG/CELL (ref 29–35)
IMM GRANULOCYTES # BLD AUTO: 0.05 K/UL (ref 0–0.11)
IMM GRANULOCYTES NFR BLD AUTO: 0.5 % (ref 0–0.9)
IMM RETICS NFR: 15.1 % (ref 2.6–16.1)
IRON SERPL-MCNC: 73 UG/DL (ref 50–180)
LDLC SERPL CALC-MCNC: 106 MG/DL
LYMPHOCYTES # BLD AUTO: 1.09 K/UL (ref 1–4.8)
LYMPHOCYTES NFR BLD: 11.1 % (ref 22–41)
MCH RBC QN AUTO: 28.3 PG (ref 27–33)
MCHC RBC AUTO-ENTMCNC: 32.7 G/DL (ref 32.3–36.5)
MCV RBC AUTO: 86.7 FL (ref 81.4–97.8)
MONOCYTES # BLD AUTO: 0.59 K/UL (ref 0–0.85)
MONOCYTES NFR BLD AUTO: 6 % (ref 0–13.4)
NEUTROPHILS # BLD AUTO: 6.6 K/UL (ref 1.82–7.42)
NEUTROPHILS NFR BLD: 67.2 % (ref 44–72)
NRBC # BLD AUTO: 0 K/UL
NRBC BLD-RTO: 0 /100 WBC (ref 0–0.2)
PLATELET # BLD AUTO: 251 K/UL (ref 164–446)
PMV BLD AUTO: 9 FL (ref 9–12.9)
POTASSIUM SERPL-SCNC: 4.3 MMOL/L (ref 3.6–5.5)
PSA SERPL DL<=0.01 NG/ML-MCNC: 4.96 NG/ML (ref 0–4)
RBC # BLD AUTO: 5.19 M/UL (ref 4.7–6.1)
RETICS # AUTO: 0.09 M/UL (ref 0.04–0.12)
RETICS/RBC NFR: 1.7 % (ref 0.8–2.6)
RHEUMATOID FACT SER IA-ACNC: <10 IU/ML (ref 0–14)
SODIUM SERPL-SCNC: 139 MMOL/L (ref 135–145)
T4 FREE SERPL-MCNC: 1.72 NG/DL (ref 0.93–1.7)
TRIGL SERPL-MCNC: 108 MG/DL (ref 0–149)
TSH SERPL DL<=0.005 MIU/L-ACNC: 6.14 UIU/ML (ref 0.38–5.33)
URATE SERPL-MCNC: 6.9 MG/DL (ref 2.5–8.3)
VIT B12 SERPL-MCNC: 585 PG/ML (ref 211–911)
WBC # BLD AUTO: 9.8 K/UL (ref 4.8–10.8)

## 2025-04-23 PROCEDURE — 82746 ASSAY OF FOLIC ACID SERUM: CPT

## 2025-04-23 PROCEDURE — 84460 ALANINE AMINO (ALT) (SGPT): CPT

## 2025-04-23 PROCEDURE — 36415 COLL VENOUS BLD VENIPUNCTURE: CPT

## 2025-04-23 PROCEDURE — 86812 HLA TYPING A B OR C: CPT

## 2025-04-23 PROCEDURE — 82607 VITAMIN B-12: CPT

## 2025-04-23 PROCEDURE — 84439 ASSAY OF FREE THYROXINE: CPT

## 2025-04-23 PROCEDURE — 80061 LIPID PANEL: CPT

## 2025-04-23 PROCEDURE — 85046 RETICYTE/HGB CONCENTRATE: CPT

## 2025-04-23 PROCEDURE — 86140 C-REACTIVE PROTEIN: CPT

## 2025-04-23 PROCEDURE — 86431 RHEUMATOID FACTOR QUANT: CPT

## 2025-04-23 PROCEDURE — 83021 HEMOGLOBIN CHROMOTOGRAPHY: CPT

## 2025-04-23 PROCEDURE — 80048 BASIC METABOLIC PNL TOTAL CA: CPT

## 2025-04-23 PROCEDURE — 85025 COMPLETE CBC W/AUTO DIFF WBC: CPT

## 2025-04-23 PROCEDURE — 83540 ASSAY OF IRON: CPT

## 2025-04-23 PROCEDURE — 83036 HEMOGLOBIN GLYCOSYLATED A1C: CPT

## 2025-04-23 PROCEDURE — 82728 ASSAY OF FERRITIN: CPT

## 2025-04-23 PROCEDURE — 84550 ASSAY OF BLOOD/URIC ACID: CPT

## 2025-04-23 PROCEDURE — 85652 RBC SED RATE AUTOMATED: CPT

## 2025-04-23 PROCEDURE — 84443 ASSAY THYROID STIM HORMONE: CPT

## 2025-04-23 PROCEDURE — 86200 CCP ANTIBODY: CPT

## 2025-04-23 PROCEDURE — 84153 ASSAY OF PSA TOTAL: CPT

## 2025-04-23 NOTE — ASSESSMENT & PLAN NOTE
Chronic condition.  The patient was seen by [facility].  Patient stated that he is being monitor.  Patient has not had PSA testing done.  Patient requests referral to see [facility] neurologist for second opinion.

## 2025-04-23 NOTE — ASSESSMENT & PLAN NOTE
Chronic condition.  Followed by I.  Patient currently being monitored without therapy.  His symptoms are well-controlled with diet therapy

## 2025-04-23 NOTE — ASSESSMENT & PLAN NOTE
Chronic condition.  Lab test showed patient with anemia.  Patient was not aware of diagnosis.  Patient denied history of bleeding.  Recommend lab test to be done for follow-up

## 2025-04-23 NOTE — ASSESSMENT & PLAN NOTE
new condition.  The patient reported diffuse joint pain since the last several weeks.  Patient reports pain noted in several joints including wrists elbows hips and knees bilaterally.  Most painful joints elbows and hips  pt denies recent trauma or injury.

## 2025-04-23 NOTE — PROGRESS NOTES
PRIMARY CARE CLINIC VISIT        Chief Complaint   Patient presents with    Joint Pain      Diffuse joint pain  Prostate cancer  Ulcerative colitis  Hypothyroidism  Hyperlipidemia  Prediabetes  Anemia        History of Present Illness     Prostate cancer (HCC)  Chronic condition.  The patient was seen by [place] [facility].  Patient stated that he is being monitor.  Patient has not had PSA testing done.  Patient requests referral [facility] for second opinion.    Ulcerative colitis (HCC)  Chronic condition.  Followed by [facility].  Patient currently being monitored without therapy.  His symptoms are well-controlled with diet therapy    Hypothyroidism  Chronic condition.  The patient presently taking levothyroxine.  Patient is due for lab test.    Dyslipidemia  Chronic condition.  The patient is currently on diet therapy.  Patient is due for lab test.    Prediabetes  Chronic condition.  Patient currently on diet therapy.  Patient is due for blood test.    Arthralgia   new condition.  The patient reported diffuse joint pain since the last several weeks.  Patient reports pain noted in several joints including wrists elbows hips and knees bilaterally.  Most painful joints elbows and hips  pt denies recent trauma or injury.    Anemia  Chronic condition.  Lab test showed patient with anemia.  Patient was not aware of diagnosis.  Patient denied history of bleeding.  Recommend lab test to be done for follow-up    Current Outpatient Medications on File Prior to Visit   Medication Sig Dispense Refill    simethicone (MYLICON) 125 MG chewable tablet Chew 125 mg every 6 hours as needed for Flatulence.      levothyroxine (SYNTHROID) 150 MCG Tab Take 1 Tablet by mouth every morning on an empty stomach. 90 Tablet 3    acetaminophen (TYLENOL) 500 MG Tab Take 2 Tablets by mouth every 6 hours. 30 Tablet 0    celecoxib (CELEBREX) 200 MG Cap Take 1 Capsule by mouth 2 times a day. 60 Capsule 1    docusate sodium (COLACE) 100 MG Cap  Take 100 mg by mouth 2 times a day as needed for Constipation.          No current facility-administered medications on file prior to visit.        Allergies: Gluten meal    Current Outpatient Medications Ordered in Epic   Medication Sig Dispense Refill    simethicone (MYLICON) 125 MG chewable tablet Chew 125 mg every 6 hours as needed for Flatulence.      levothyroxine (SYNTHROID) 150 MCG Tab Take 1 Tablet by mouth every morning on an empty stomach. 90 Tablet 3    acetaminophen (TYLENOL) 500 MG Tab Take 2 Tablets by mouth every 6 hours. 30 Tablet 0    celecoxib (CELEBREX) 200 MG Cap Take 1 Capsule by mouth 2 times a day. 60 Capsule 1    docusate sodium (COLACE) 100 MG Cap Take 100 mg by mouth 2 times a day as needed for Constipation.          No current Epic-ordered facility-administered medications on file.       Past Medical History:   Diagnosis Date    Arthritis     Bowel habit changes 11/01/2024    chronic constipation    Cancer (HCC) 11/01/2024    prostate cancer being monitored, no surgery    Hypothyroid 05/16/2013    IBD (inflammatory bowel disease)     Midline low back pain without sciatica 10/26/2020    Pain 11/01/2024    right shoulder pain and bilateral groin pain    Ulcerative colitis (HCC)     manged by diet over the past 30 years    Vitiligo 05/16/2013       Past Surgical History:   Procedure Laterality Date    REPAIR HERNIA INGUINAL ROBOT ASSISTED, USING DV5 Bilateral 11/21/2024    Procedure: REPAIR BILATERAL HERNIA INGUINAL ROBOT ASSISTED, USING DV5;  Surgeon: Raymond Lee M.D.;  Location: SURGERY [facility];  Service: Gen Robotic    PB SHLDR ARTHROSCOP,SURG,W/ROTAT CUFF REPB Right 09/30/2024    Procedure: RIGHT SHOULDER ARTHROSCOPY, POSSIBLE RIGHT ROTATOR CUFF REPAIR, RIGHT SUBACROMIAL DECOMPRESSION, RIGHT LABRAL DEBRIDEMENT, RIGHT OPEN BICEPS TENODESIS, REPAIRS AS INDICATED;  Surgeon: Rashad Glnyn M.D.;  Location: [facility];  Service: Orthopedics    KNEE  RECONSTRUCTION  1988    Right knee   [place]  ACL replacement     ORIF, KNEE  35 years ago    Right Knee Rebuild    SHOULDER SURGERY  2024       Family History   Problem Relation Age of Onset    Cancer Mother         Breast Cancer--metastatic    Heart Disease Father     Stroke Father     Stroke Brother     Stroke Sister         aneursym       Social History     Tobacco Use   Smoking Status Former    Current packs/day: 0.00    Average packs/day: 0.1 packs/day for 10.0 years (1.0 ttl pk-yrs)    Types: Cigarettes    Start date: 1976    Quit date: 1986    Years since quittin.3    Passive exposure: Never   Smokeless Tobacco Never       Social History     Substance and Sexual Activity   Alcohol Use Yes    Alcohol/week: 1.8 oz    Types: 2 Cans of beer, 1 Standard drinks or equivalent per week    Comment: 0-1 monthly       Review of systems  As per HPI above. All other systems reviewed and negative.      Past Medical, Social, and Family history reviewed and updated in [facility]       LAB DATA:     I have independently reviewed / interpreted labs    Lab Results   Component Value Date/Time    HBA1C 5.8 (H) 10/29/2024 07:44 AM    HBA1C 5.1 2024 07:21 AM    HBA1C 5.1 10/07/2022 08:06 AM        Lab Results   Component Value Date/Time    WBC 8.6 2024 03:28 AM    HEMOGLOBIN 11.0 (L) 2024 03:28 AM    HEMATOCRIT 32.2 (L) 2024 03:28 AM    MCV 87.3 2024 03:28 AM    PLATELETCT 173 2024 03:28 AM       Lab Results   Component Value Date/Time    SODIUM 138 2024 03:28 AM    POTASSIUM 4.2 2024 03:28 AM    GLUCOSE 109 (H) 2024 03:28 AM    BUN 10 2024 03:28 AM    CREATININE 0.79 2024 03:28 AM       Lab Results   Component Value Date/Time    CHOLSTRLTOT 193 10/29/2024 07:44 AM    TRIGLYCERIDE 132 10/29/2024 07:44 AM    HDL 43 10/29/2024 07:44 AM     (H) 10/29/2024 07:44 AM       Lab Results   Component Value Date/Time    ALTSGPT 16 2024  "12:14 AM          Objective     /80 (BP Location: Right arm, Patient Position: Sitting, BP Cuff Size: Adult)   Pulse 82   Temp 36.6 °C (97.9 °F) (Temporal)   Resp 16   Ht 1.854 m (6' 1\")   Wt 76.2 kg (168 lb)   SpO2 97%    Body mass index is 22.16 kg/m².    General: alert in no apparent distress.  Cardiovascular: regular rate and rhythm  Pulmonary: lungs : no wheezing   Gastrointestinal: BS present.   Elbows no significant swelling or redness or deformity.  Range of motion full  Wrist no swelling redness or deformity.  Range of motion unremarkable.  Knees : No signficant swelling redness or deformity.   ROM limited due to pain specifically w knee flexion/extension.      Hips : no significant swelling, redness or deformity.   Pain noted w palpation trochanteric region  ROM limited due to pain noted w hip abduction and external rotation     Assessment and Plan     1. Arthralgia, unspecified joint  Acute condition.  Cause unclear.  Further investigation warranted.  Suspect degenerative joint/osteoarthritis    - RHEUMATOID ARTHRITIS FACTOR; Future  - Sed Rate; Future  - URIC ACID; Future  - CRP QUANTITIVE (NON-CARDIAC); Future  - CCP ANTIBODIES, IGG/IGA; Future  - HLA-B27; Future  - DX-HIP-BILATERAL-WITH PELVIS-2 VIEWS; Future  - DX-ELBOW-COMPLETE 3+ LEFT; Future  - DX-ELBOW-COMPLETE 3+ RIGHT; Future  Follow-up after testing done.    2. Prostate cancer (HCC)  Chronic condition.  Current status unclear.  Patient request referral to [facility] urology for consultation.  - PROSTATE SPECIFIC AG SCREENING; Future  - Referral to Urology    3. Ulcerative colitis with complication, unspecified location (HCC)  Stable.  Patient currently not on therapy.  Recommend follow-up with GI specialist as directed    4. Acquired hypothyroidism  - TSH WITH REFLEX TO FT4; Future  Chronic condition.  Current status unclear.  Lab test ordered to check TSH.  Continue levothyroxine 150 mcg daily    5. Dyslipidemia  Chronic condition.  " Current status unclear.  Lipid panel ordered.  Continue with diet and lifestyle modification  - ALANINE AMINO-TRANS; Future  - Lipid Profile; Future    6. Prediabetes  - HEMOGLOBIN A1C; Future  - Basic Metabolic Panel; Future  Chronic condition.  Current status unclear.  Lab test ordered to check A1c.    7. Anemia, unspecified type  This is a chronic condition.  Cause unclear.  Lab test requested to check the following  - CBC WITH DIFFERENTIAL; Future  - FERRITIN; Future  - OCCULT BLOOD STOOL; Future  - FOLATE; Future  - IRON; Future  - RETICULOCYTES COUNT; Future  - VITAMIN B12; Future                      Please note that this dictation was created using voice recognition software. I have made every reasonable attempt to correct obvious errors, but I expect that there are errors of grammar and possibly content that I did not discover before finalizing the note.    Zhou Cardenas MD  Internal Medicine  [facility]

## 2025-04-24 LAB — HLA-B27 QL FC: NEGATIVE

## 2025-04-25 ENCOUNTER — HOSPITAL ENCOUNTER (OUTPATIENT)
Dept: RADIOLOGY | Facility: MEDICAL CENTER | Age: 65
End: 2025-04-25
Attending: INTERNAL MEDICINE
Payer: COMMERCIAL

## 2025-04-25 ENCOUNTER — HOSPITAL ENCOUNTER (OUTPATIENT)
Facility: MEDICAL CENTER | Age: 65
End: 2025-04-25
Attending: INTERNAL MEDICINE
Payer: COMMERCIAL

## 2025-04-25 DIAGNOSIS — M25.50 ARTHRALGIA, UNSPECIFIED JOINT: ICD-10-CM

## 2025-04-25 DIAGNOSIS — D64.9 ANEMIA, UNSPECIFIED TYPE: ICD-10-CM

## 2025-04-25 LAB
CCP IGA+IGG SERPL IA-ACNC: 5 UNITS (ref 0–19)
HEMOCCULT STL QL: NEGATIVE
HGB A1 MFR BLD: 97.1 % (ref 95–97.9)
HGB A2 MFR BLD: 2.7 % (ref 2–3.5)
HGB C MFR BLD: 0 % (ref 0–0)
HGB E MFR BLD: 0 % (ref 0–0)
HGB F MFR BLD: 0.2 % (ref 0–2.1)
HGB FRACT BLD ELPH-IMP: NORMAL
HGB OTHER MFR BLD: 0 % (ref 0–0)
HGB S BLD QL SOLY: NORMAL
HGB S MFR BLD: 0 % (ref 0–0)
PATH INTERP BLD-IMP: NORMAL

## 2025-04-25 PROCEDURE — 73080 X-RAY EXAM OF ELBOW: CPT | Mod: LT

## 2025-04-25 PROCEDURE — 73080 X-RAY EXAM OF ELBOW: CPT | Mod: RT

## 2025-04-25 PROCEDURE — 73521 X-RAY EXAM HIPS BI 2 VIEWS: CPT

## 2025-04-25 PROCEDURE — 82272 OCCULT BLD FECES 1-3 TESTS: CPT

## 2025-04-26 ENCOUNTER — RESULTS FOLLOW-UP (OUTPATIENT)
Dept: MEDICAL GROUP | Facility: PHYSICIAN GROUP | Age: 65
End: 2025-04-26
Payer: COMMERCIAL

## 2025-04-26 DIAGNOSIS — C61 PROSTATE CANCER (HCC): Chronic | ICD-10-CM

## 2025-04-26 DIAGNOSIS — E03.9 ACQUIRED HYPOTHYROIDISM: Chronic | ICD-10-CM

## 2025-04-26 DIAGNOSIS — M25.551 HIP PAIN, BILATERAL: ICD-10-CM

## 2025-04-26 DIAGNOSIS — D64.9 ANEMIA, UNSPECIFIED TYPE: Chronic | ICD-10-CM

## 2025-04-26 DIAGNOSIS — M25.552 HIP PAIN, BILATERAL: ICD-10-CM

## 2025-04-26 DIAGNOSIS — M54.16 LUMBAR RADICULOPATHY: ICD-10-CM

## 2025-04-30 NOTE — Clinical Note
REFERRAL APPROVAL NOTICE         Sent on April 30, 2025                   Estrada Toussaint  [address] [facility] [place] [address]                   Dear Mr. Toussaint,    After a careful review of the medical information and benefit coverage, [facility] has processed your referral. See below for additional details.    If applicable, you must be actively enrolled with your insurance for coverage of the authorized service. If you have any questions regarding your coverage, please contact your insurance directly.    REFERRAL INFORMATION   Referral #:  [id number]  Referred-To Department    Referred-By Provider:  Urology    Zhou Cardenas M.D.   [facility]      [address]  [place] [address]  [phone] [address] [facility] [address]  [place] [id number] [phone]    Referral Start Date:  04/22/2025  Referral End Date:   04/22/2026             SCHEDULING  If you do not already have an appointment, please call [phone] to make an appointment.     MORE INFORMATION  If you do not already have a [employer] account, sign up at: [email or web address]  You can access your medical information, make appointments, see lab results, billing information, and more.  If you have questions regarding this referral, please contact  the [facility] Referrals department at:             [phone]. Monday - Friday 8:00AM - 5:00PM.     Sincerely,    [facility]

## 2025-05-02 NOTE — Clinical Note
REFERRAL APPROVAL NOTICE         Sent on May 2, 2025                   Estrada Toussaint  [facility] [address] [facility] [address]                   Dear Mr. Toussaint,    After a careful review of the medical information and benefit coverage, [facility] has processed your referral. See below for additional details.    If applicable, you must be actively enrolled with your insurance for coverage of the authorized service. If you have any questions regarding your coverage, please contact your insurance directly.    REFERRAL INFORMATION   Referral #:  [id number]  Referred-To Department    Referred-By Provider:  Orthopedics    Zhou Cardenas M.D.   [facility] ([facility]      [address] [address]  [phone] [address] [place] [address]  [phone]    Referral Start Date:  04/26/2025  Referral End Date:   04/26/2026             SCHEDULING  If you do not already have an appointment, please call [phone] to make an appointment.     MORE INFORMATION  If you do not already have a [employer] account, sign up at: [email or web address]  You can access your medical information, make appointments, see lab results, billing information, and more.  If you have questions regarding this referral, please contact  the [facility] Referrals department at:             [phone]. Monday - Friday 8:00AM - 5:00PM.     Sincerely,    [facility]

## 2025-05-07 NOTE — PROGRESS NOTES
Subjective  Estrada Toussaint is a 65 y.o. male who presents today for prostate cancer. Diagnosed at  18 months ago. Abdirashid 6. Put on active surveillance.       PSA 8/2024 2.46. PSA on 4/23/25 4.96.    EXAM DATE:  3/11/2025 10:15 AM    INDICATION: Prostate cancer surveillance    PROCEDURE: MRI PROSTATE W WO CONTRAST    COMPARISON: Outside MRI 12/31/2023    TECHNIQUE: Multiparametric prostate MR was performed without and with 14.5 mL Clariscan intravenous gadolinium contrast on 3T Siemens Magnetom [place] magnet with 18-channel torso coil. Small field of view multiplanar T2, diffusion weighted (b values 50, 800, 1500), and dynamic contrast enhanced sequences (power injected, 2cc/sec) were performed with post processing (K-trans, KEP, and VE parametric maps).      FINDINGS: No suspicious prostate lesion     Mildly enlarged prostate with evidence of hyperplasia.     PI-RADS 2: Low (clinically significant cancer is unlikely)     PROSTATE SIZE: 4.9 x 4.9 x 3.8 cm, 47 mL.    TRANSITIONAL ZONE: Diffuse heterogeneity.  Several encapsulated nodules.  No suspicious lesion seen    PERIPHERAL ZONE: Fairly widespread intermediate T2 signal likely sequela of prior prostatitis.  No concerning restricted diffusion.  No suspicious signal abnormality.    SEMINAL VESICLES: Unremarkable    BLADDER: Unremarkable    PELVIC LYMPH NODES: No lymphadenopathy seen    BONES: No bone lesion    OTHER: Postsurgical changes of previous hernia repair.  Exam End: 03/11/25 11:14 AM       Family History   Problem Relation Age of Onset    Cancer Mother         Breast Cancer--metastatic    Heart Disease Father     Stroke Father     Stroke Brother     Stroke Sister         aneursym       Social History     Socioeconomic History    Marital status:    Tobacco Use    Smoking status: Former     Current packs/day: 0.00     Average packs/day: 0.1 packs/day for 10.0 years (1.0 ttl pk-yrs)     Types: Cigarettes     Start date: 1/1/1976     Quit date:  1986     Years since quittin.3     Passive exposure: Never    Smokeless tobacco: Never   Vaping Use    Vaping status: Never Used   Substance and Sexual Activity    Alcohol use: Yes     Alcohol/week: 1.8 oz     Types: 2 Cans of beer, 1 Standard drinks or equivalent per week     Comment: 0-1 monthly    Drug use: Not Currently     Types: Oral     Comment: THC edibles occasionally for sleep    Sexual activity: Yes     Partners: Female     Birth control/protection: Post-Menopausal, Surgical     Comment: WIfe has had tubal ligation     Social Drivers of Health     Food Insecurity: No Food Insecurity (2024)    Hunger Vital Sign     Worried About Running Out of Food in the Last Year: Never true     Ran Out of Food in the Last Year: Never true   Transportation Needs: No Transportation Needs (2024)    PRAPARE - Transportation     Lack of Transportation (Medical): No     Lack of Transportation (Non-Medical): No   Intimate Partner Violence: Not At Risk (2024)    Humiliation, Afraid, Rape, and Kick questionnaire     Fear of Current or Ex-Partner: No     Emotionally Abused: No     Physically Abused: No     Sexually Abused: No   Housing Stability: Low Risk  (2024)    Housing Stability Vital Sign     Unable to Pay for Housing in the Last Year: No     Number of Times Moved in the Last Year: 0     Homeless in the Last Year: No       Past Surgical History:   Procedure Laterality Date    REPAIR HERNIA INGUINAL ROBOT ASSISTED, USING DV5 Bilateral 2024    Procedure: REPAIR BILATERAL HERNIA INGUINAL ROBOT ASSISTED, USING DV5;  Surgeon: Raymond Lee M.D.;  Location: SURGERY [facility];  Service: Gen Robotic     SHLDR ARTHROSCOP,SURG,W/ROTAT CUFF REPB Right 2024    Procedure: RIGHT SHOULDER ARTHROSCOPY, POSSIBLE RIGHT ROTATOR CUFF REPAIR, RIGHT SUBACROMIAL DECOMPRESSION, RIGHT LABRAL DEBRIDEMENT, RIGHT OPEN BICEPS TENODESIS, REPAIRS AS INDICATED;  Surgeon: Rashad Glynn M.D.;   "Location: [facility];  Service: Orthopedics    KNEE RECONSTRUCTION  01/01/1988    Right knee   [place]  ACL replacement     ORIF, KNEE  35 years ago    Right Knee Rebuild    SHOULDER SURGERY  Sept 30 2024       Past Medical History:   Diagnosis Date    Arthritis     Bowel habit changes 11/01/2024    chronic constipation    Cancer (HCC) 11/01/2024    prostate cancer being monitored, no surgery    Hypothyroid 05/16/2013    IBD (inflammatory bowel disease)     Midline low back pain without sciatica 10/26/2020    Pain 11/01/2024    right shoulder pain and bilateral groin pain    Ulcerative colitis (HCC)     manged by diet over the past 30 years    Vitiligo 05/16/2013       Current Outpatient Medications   Medication Sig    acetaminophen (TYLENOL) 500 MG Tab Take 2 Tablets by mouth every 6 hours.    celecoxib (CELEBREX) 200 MG Cap Take 1 Capsule by mouth 2 times a day.    simethicone (MYLICON) 125 MG chewable tablet Chew 125 mg every 6 hours as needed for Flatulence.    docusate sodium (COLACE) 100 MG Cap Take 100 mg by mouth 2 times a day as needed for Constipation.       levothyroxine (SYNTHROID) 150 MCG Tab Take 1 Tablet by mouth every morning on an empty stomach.       Allergies   Allergen Reactions    Gluten Meal      GI issues with Gluten       Objective  There were no vitals taken for this visit.  Physical Exam  Constitutional:       Appearance: Normal appearance. He is normal weight.   Neurological:      Mental Status: He is alert.           Labs:   POCT UA No results found for: \"POCCOLOR\", \"POCAPPEAR\", \"POCLEUKEST\", \"POCNITRITE\", \"POCUROBILIGE\", \"POCPROTEIN\", \"POCURPH\", \"POCBLOOD\", \"POCSPGRV\", \"POCKETONES\", \"POCBILIRUBIN\", \"POCGLUCUA\"   CBC   Lab Results   Component Value Date/Time    WBC 9.8 04/23/2025 0734    RBC 5.19 04/23/2025 0734    HEMOGLOBIN 14.7 04/23/2025 0734    HEMATOCRIT 45.0 04/23/2025 0734    MCV 86.7 04/23/2025 0734    MCH 28.3 04/23/2025 0734    MCHC 32.7 04/23/2025 0734 "    RDW 40.7 04/23/2025 0734    MPV 9.0 04/23/2025 0734    LYMPHOCYTES 11.10 (L) 04/23/2025 0734    LYMPHS 1.09 04/23/2025 0734    MONOCYTES 6.00 04/23/2025 0734    MONOS 0.59 04/23/2025 0734    EOSINOPHILS 14.60 (H) 04/23/2025 0734    EOS 1.43 (H) 04/23/2025 0734    BASOPHILS 0.60 04/23/2025 0734    BASO 0.06 04/23/2025 0734    NRBC 0.00 04/23/2025 0734       BMP   Lab Results   Component Value Date/Time    SODIUM 139 04/23/2025 0734    POTASSIUM 4.3 04/23/2025 0734    CHLORIDE 103 04/23/2025 0734    CO2 26 04/23/2025 0734    GLUCOSE 101 (H) 04/23/2025 0734    BUN 12 04/23/2025 0734    CREATININE 0.91 04/23/2025 0734    CALCIUM 8.9 04/23/2025 0734     PSA   Lab Results   Component Value Date/Time    PSATOTAL 4.96 (H) 04/23/2025 0734       Imaging:   Above    Assessment & Plan    Active Surveillance for prostate cancer.   Will repeat PSA in one week for level of 4.96.  RTC with PSA in 6 months.     Cj Donald M.D., F.A.C.S.  Urologic Oncology  Vice-Chair, Department of Surgery  [facility]

## 2025-05-08 ENCOUNTER — OFFICE VISIT (OUTPATIENT)
Dept: UROLOGY | Facility: MEDICAL CENTER | Age: 65
End: 2025-05-08
Payer: COMMERCIAL

## 2025-05-08 DIAGNOSIS — R97.20 ELEVATED PSA: ICD-10-CM

## 2025-05-08 PROCEDURE — 99204 OFFICE O/P NEW MOD 45 MIN: CPT | Performed by: UROLOGY

## 2025-08-18 RX ORDER — LEVOTHYROXINE SODIUM 150 UG/1
150 TABLET ORAL
Qty: 90 TABLET | Refills: 3 | Status: SHIPPED | OUTPATIENT
Start: 2025-08-18

## 2025-08-21 ENCOUNTER — HOSPITAL ENCOUNTER (OUTPATIENT)
Dept: LAB | Facility: MEDICAL CENTER | Age: 65
End: 2025-08-21
Payer: COMMERCIAL

## 2025-08-21 ENCOUNTER — HOSPITAL ENCOUNTER (OUTPATIENT)
Dept: LAB | Facility: MEDICAL CENTER | Age: 65
End: 2025-08-21
Attending: INTERNAL MEDICINE
Payer: COMMERCIAL

## 2025-08-21 ENCOUNTER — HOSPITAL ENCOUNTER (OUTPATIENT)
Dept: LAB | Facility: MEDICAL CENTER | Age: 65
End: 2025-08-21
Attending: UROLOGY
Payer: COMMERCIAL

## 2025-08-21 DIAGNOSIS — E03.9 ACQUIRED HYPOTHYROIDISM: Chronic | ICD-10-CM

## 2025-08-21 DIAGNOSIS — R97.20 ELEVATED PSA: ICD-10-CM

## 2025-08-21 LAB
BUN SERPL-MCNC: 10 MG/DL (ref 8–22)
CREAT SERPL-MCNC: 1.07 MG/DL (ref 0.5–1.4)
GFR SERPLBLD CREATININE-BSD FMLA CKD-EPI: 77 ML/MIN/1.73 M 2
PSA SERPL DL<=0.01 NG/ML-MCNC: 3.63 NG/ML (ref 0–4)
T3 SERPL-MCNC: 69.7 NG/DL (ref 60–181)
T4 FREE SERPL-MCNC: 1.46 NG/DL (ref 0.93–1.7)
TSH SERPL-ACNC: 5.4 UIU/ML (ref 0.38–5.33)

## 2025-08-21 PROCEDURE — 82565 ASSAY OF CREATININE: CPT

## 2025-08-21 PROCEDURE — 84520 ASSAY OF UREA NITROGEN: CPT

## 2025-08-21 PROCEDURE — 84480 ASSAY TRIIODOTHYRONINE (T3): CPT

## 2025-08-21 PROCEDURE — 84443 ASSAY THYROID STIM HORMONE: CPT

## 2025-08-21 PROCEDURE — 84153 ASSAY OF PSA TOTAL: CPT

## 2025-08-21 PROCEDURE — 84439 ASSAY OF FREE THYROXINE: CPT

## 2025-08-21 PROCEDURE — 36415 COLL VENOUS BLD VENIPUNCTURE: CPT

## (undated) DEVICE — DERMABOND ADVANCED - (12EA/BX)

## (undated) DEVICE — ELECTRODE DUAL RETURN W/ CORD - (50/PK)

## (undated) DEVICE — SUTURE 2-0 STRATAFIX SPIRAL PDS SH (12EA/BX)

## (undated) DEVICE — GLOVE BIOGEL PI INDICATOR SZ 6.5 SURGICAL PF LF - (50/BX 4BX/CA)

## (undated) DEVICE — DRAPE COLUMN BOX OF 20

## (undated) DEVICE — COVER LIGHT HANDLE ALC PLUS DISP (18EA/BX)

## (undated) DEVICE — GLOVE BIOGEL SZ 8 SURGICAL PF LTX - (50PR/BX 4BX/CA)

## (undated) DEVICE — SENSOR OXIMETER ADULT SPO2 RD SET (20EA/BX)

## (undated) DEVICE — BIPOLAR FORCE DA VINCI 12X'S REISABLE

## (undated) DEVICE — BLADE SURGICAL #15 - (50/BX 3BX/CA)

## (undated) DEVICE — SHEARS MONOPOLAR CURVED DA VINCI 10X'S REUSABLE

## (undated) DEVICE — SET EXTENSION WITH 2 PORTS (48EA/CA) ***PART #2C8610 IS A SUBSTITUTE*****

## (undated) DEVICE — TUBING CLEARLINK DUO-VENT - C-FLO (48EA/CA)

## (undated) DEVICE — SUTURE 3-0 VICRYL PLUS SH - 27 INCH (36/BX)

## (undated) DEVICE — Device

## (undated) DEVICE — GOWN SURGEONS X-LARGE - DISP. (30/CA)

## (undated) DEVICE — SUTURE GENERAL

## (undated) DEVICE — SEAL UNIVERSAL 5MM-12MM (10EA/BX)

## (undated) DEVICE — GOWN WARMING STANDARD FLEX - (30/CA)

## (undated) DEVICE — GLOVE BIOGEL PI INDICATOR SZ 8.0 SURGICAL PF LF -(50/BX 4BX/CA)

## (undated) DEVICE — OBTURATOR BLADELESS STANDARD 8MM (6EA/BX)

## (undated) DEVICE — COVER TIP ENDOWRIST HOT SHEAR - (10EA/BX) DA VINCI

## (undated) DEVICE — DRAPE ARM BOX OF 20

## (undated) DEVICE — LACTATED RINGERS INJ 1000 ML - (14EA/CA 60CA/PF)

## (undated) DEVICE — NEEDLE DRIVER MEGA SUTURECUT DA VINCI 15X'S REUSABLE

## (undated) DEVICE — SUTURE 4-0 MONOCRYL PLUS PS-1 - 27 INCH (36/BX)

## (undated) DEVICE — NEEDLE INSFL 120MM 14GA VRRS - (20/BX)

## (undated) DEVICE — CANISTER SUCTION 3000ML MECHANICAL FILTER AUTO SHUTOFF MEDI-VAC NONSTERILE LF DISP (40EA/CA)

## (undated) DEVICE — CHLORAPREP 26 ML APPLICATOR - ORANGE TINT(25/CA)

## (undated) DEVICE — SLEEVE VASO DVT COMPRESSION CALF MED - (10PR/CA)

## (undated) DEVICE — SET LEADWIRE 5 LEAD BEDSIDE DISPOSABLE ECG (1SET OF 5/EA)